# Patient Record
Sex: MALE | Race: BLACK OR AFRICAN AMERICAN | NOT HISPANIC OR LATINO | URBAN - METROPOLITAN AREA
[De-identification: names, ages, dates, MRNs, and addresses within clinical notes are randomized per-mention and may not be internally consistent; named-entity substitution may affect disease eponyms.]

---

## 2017-01-22 ENCOUNTER — INPATIENT (INPATIENT)
Facility: HOSPITAL | Age: 57
LOS: 12 days | Discharge: HOME HEALTH | End: 2017-02-04
Attending: SURGERY | Admitting: SURGERY
Payer: COMMERCIAL

## 2017-01-22 ENCOUNTER — RESULT REVIEW (OUTPATIENT)
Age: 57
End: 2017-01-22

## 2017-01-22 VITALS
SYSTOLIC BLOOD PRESSURE: 150 MMHG | OXYGEN SATURATION: 97 % | HEIGHT: 68 IN | DIASTOLIC BLOOD PRESSURE: 106 MMHG | TEMPERATURE: 100 F | WEIGHT: 134.92 LBS | HEART RATE: 128 BPM | RESPIRATION RATE: 22 BRPM

## 2017-01-22 DIAGNOSIS — I10 ESSENTIAL (PRIMARY) HYPERTENSION: ICD-10-CM

## 2017-01-22 DIAGNOSIS — F10.230 ALCOHOL DEPENDENCE WITH WITHDRAWAL, UNCOMPLICATED: ICD-10-CM

## 2017-01-22 DIAGNOSIS — J45.909 UNSPECIFIED ASTHMA, UNCOMPLICATED: ICD-10-CM

## 2017-01-22 DIAGNOSIS — L02.211 CUTANEOUS ABSCESS OF ABDOMINAL WALL: ICD-10-CM

## 2017-01-22 DIAGNOSIS — Z98.890 OTHER SPECIFIED POSTPROCEDURAL STATES: Chronic | ICD-10-CM

## 2017-01-22 PROBLEM — Z00.00 ENCOUNTER FOR PREVENTIVE HEALTH EXAMINATION: Status: ACTIVE | Noted: 2017-01-22

## 2017-01-22 LAB
ALBUMIN SERPL ELPH-MCNC: 3.5 G/DL — SIGNIFICANT CHANGE UP (ref 3.3–5)
ALBUMIN SERPL ELPH-MCNC: 3.9 G/DL — SIGNIFICANT CHANGE UP (ref 3.3–5)
ALP SERPL-CCNC: 104 U/L — SIGNIFICANT CHANGE UP (ref 40–120)
ALP SERPL-CCNC: 99 U/L — SIGNIFICANT CHANGE UP (ref 40–120)
ALT FLD-CCNC: 46 U/L — SIGNIFICANT CHANGE UP (ref 12–78)
ALT FLD-CCNC: 57 U/L — SIGNIFICANT CHANGE UP (ref 12–78)
AMPHET UR-MCNC: NEGATIVE — SIGNIFICANT CHANGE UP
ANION GAP SERPL CALC-SCNC: 14 MMOL/L — SIGNIFICANT CHANGE UP (ref 5–17)
APTT BLD: 50.7 SEC — HIGH (ref 27.5–37.4)
AST SERPL-CCNC: 40 U/L — HIGH (ref 15–37)
AST SERPL-CCNC: 51 U/L — HIGH (ref 15–37)
BARBITURATES UR SCN-MCNC: NEGATIVE — SIGNIFICANT CHANGE UP
BASOPHILS # BLD AUTO: 0 K/UL — SIGNIFICANT CHANGE UP (ref 0–0.2)
BASOPHILS NFR BLD AUTO: 0.3 % — SIGNIFICANT CHANGE UP (ref 0–2)
BENZODIAZ UR-MCNC: NEGATIVE — SIGNIFICANT CHANGE UP
BILIRUB DIRECT SERPL-MCNC: 0.21 MG/DL — HIGH (ref 0.05–0.2)
BILIRUB INDIRECT FLD-MCNC: 0.6 MG/DL — SIGNIFICANT CHANGE UP (ref 0.2–1)
BILIRUB SERPL-MCNC: 0.7 MG/DL — SIGNIFICANT CHANGE UP (ref 0.2–1.2)
BILIRUB SERPL-MCNC: 0.8 MG/DL — SIGNIFICANT CHANGE UP (ref 0.2–1.2)
BLD GP AB SCN SERPL QL: SIGNIFICANT CHANGE UP
BUN SERPL-MCNC: 14 MG/DL — SIGNIFICANT CHANGE UP (ref 7–23)
CALCIUM SERPL-MCNC: 9 MG/DL — SIGNIFICANT CHANGE UP (ref 8.5–10.1)
CHLORIDE SERPL-SCNC: 98 MMOL/L — SIGNIFICANT CHANGE UP (ref 96–108)
CO2 SERPL-SCNC: 25 MMOL/L — SIGNIFICANT CHANGE UP (ref 22–31)
COCAINE METAB.OTHER UR-MCNC: NEGATIVE — SIGNIFICANT CHANGE UP
CREAT SERPL-MCNC: 1 MG/DL — SIGNIFICANT CHANGE UP (ref 0.5–1.3)
EOSINOPHIL # BLD AUTO: 0 K/UL — SIGNIFICANT CHANGE UP (ref 0–0.5)
EOSINOPHIL NFR BLD AUTO: 0.2 % — SIGNIFICANT CHANGE UP (ref 0–6)
ETHANOL SERPL-MCNC: <10 MG/DL — SIGNIFICANT CHANGE UP (ref 0–10)
GLUCOSE SERPL-MCNC: 81 MG/DL — SIGNIFICANT CHANGE UP (ref 70–99)
HCT VFR BLD CALC: 41.9 % — SIGNIFICANT CHANGE UP (ref 39–50)
HGB BLD-MCNC: 15.2 G/DL — SIGNIFICANT CHANGE UP (ref 13–17)
INR BLD: 1.22 RATIO — HIGH (ref 0.88–1.16)
LACTATE SERPL-SCNC: 1.7 MMOL/L — SIGNIFICANT CHANGE UP (ref 0.7–2)
LYMPHOCYTES # BLD AUTO: 0.4 K/UL — LOW (ref 1–3.3)
LYMPHOCYTES # BLD AUTO: 3.1 % — LOW (ref 13–44)
MAGNESIUM SERPL-MCNC: 2 MG/DL — SIGNIFICANT CHANGE UP (ref 1.8–2.4)
MCHC RBC-ENTMCNC: 34.2 PG — HIGH (ref 27–34)
MCHC RBC-ENTMCNC: 36.2 GM/DL — HIGH (ref 32–36)
MCV RBC AUTO: 94.5 FL — SIGNIFICANT CHANGE UP (ref 80–100)
METHADONE UR-MCNC: NEGATIVE — SIGNIFICANT CHANGE UP
MONOCYTES # BLD AUTO: 0.6 K/UL — SIGNIFICANT CHANGE UP (ref 0–0.9)
MONOCYTES NFR BLD AUTO: 4.4 % — SIGNIFICANT CHANGE UP (ref 2–14)
NEUTROPHILS # BLD AUTO: 12.5 K/UL — HIGH (ref 1.8–7.4)
NEUTROPHILS NFR BLD AUTO: 92.1 % — HIGH (ref 43–77)
OPIATES UR-MCNC: POSITIVE — SIGNIFICANT CHANGE UP
PCP SPEC-MCNC: SIGNIFICANT CHANGE UP
PCP UR-MCNC: NEGATIVE — SIGNIFICANT CHANGE UP
PHOSPHATE SERPL-MCNC: 2.7 MG/DL — SIGNIFICANT CHANGE UP (ref 2.5–4.5)
PLATELET # BLD AUTO: 231 K/UL — SIGNIFICANT CHANGE UP (ref 150–400)
POTASSIUM SERPL-MCNC: 4 MMOL/L — SIGNIFICANT CHANGE UP (ref 3.5–5.3)
POTASSIUM SERPL-SCNC: 4 MMOL/L — SIGNIFICANT CHANGE UP (ref 3.5–5.3)
PROT SERPL-MCNC: 8.3 GM/DL — SIGNIFICANT CHANGE UP (ref 6–8.3)
PROT SERPL-MCNC: 9.1 GM/DL — HIGH (ref 6–8.3)
PROTHROM AB SERPL-ACNC: 13.7 SEC — HIGH (ref 10–13.1)
RBC # BLD: 4.43 M/UL — SIGNIFICANT CHANGE UP (ref 4.2–5.8)
RBC # FLD: 11 % — SIGNIFICANT CHANGE UP (ref 11–15)
SODIUM SERPL-SCNC: 137 MMOL/L — SIGNIFICANT CHANGE UP (ref 135–145)
THC UR QL: NEGATIVE — SIGNIFICANT CHANGE UP
WBC # BLD: 13.5 K/UL — HIGH (ref 3.8–10.5)
WBC # FLD AUTO: 13.5 K/UL — HIGH (ref 3.8–10.5)

## 2017-01-22 PROCEDURE — 74177 CT ABD & PELVIS W/CONTRAST: CPT | Mod: 26

## 2017-01-22 PROCEDURE — 71010: CPT | Mod: 26

## 2017-01-22 PROCEDURE — 99285 EMERGENCY DEPT VISIT HI MDM: CPT

## 2017-01-22 RX ORDER — SODIUM CHLORIDE 9 MG/ML
1000 INJECTION, SOLUTION INTRAVENOUS
Qty: 0 | Refills: 0 | Status: COMPLETED | OUTPATIENT
Start: 2017-01-22 | End: 2017-01-23

## 2017-01-22 RX ORDER — ACETAMINOPHEN 500 MG
1000 TABLET ORAL ONCE
Qty: 0 | Refills: 0 | Status: COMPLETED | OUTPATIENT
Start: 2017-01-22 | End: 2017-01-22

## 2017-01-22 RX ORDER — METOPROLOL TARTRATE 50 MG
5 TABLET ORAL EVERY 6 HOURS
Qty: 0 | Refills: 0 | Status: DISCONTINUED | OUTPATIENT
Start: 2017-01-22 | End: 2017-01-23

## 2017-01-22 RX ORDER — SODIUM CHLORIDE 9 MG/ML
1000 INJECTION INTRAMUSCULAR; INTRAVENOUS; SUBCUTANEOUS
Qty: 0 | Refills: 0 | Status: DISCONTINUED | OUTPATIENT
Start: 2017-01-22 | End: 2017-01-22

## 2017-01-22 RX ORDER — PIPERACILLIN AND TAZOBACTAM 4; .5 G/20ML; G/20ML
3.38 INJECTION, POWDER, LYOPHILIZED, FOR SOLUTION INTRAVENOUS EVERY 8 HOURS
Qty: 0 | Refills: 0 | Status: DISCONTINUED | OUTPATIENT
Start: 2017-01-22 | End: 2017-01-23

## 2017-01-22 RX ORDER — MORPHINE SULFATE 50 MG/1
4 CAPSULE, EXTENDED RELEASE ORAL ONCE
Qty: 0 | Refills: 0 | Status: DISCONTINUED | OUTPATIENT
Start: 2017-01-22 | End: 2017-01-22

## 2017-01-22 RX ORDER — HEPARIN SODIUM 5000 [USP'U]/ML
5000 INJECTION INTRAVENOUS; SUBCUTANEOUS EVERY 12 HOURS
Qty: 0 | Refills: 0 | Status: DISCONTINUED | OUTPATIENT
Start: 2017-01-22 | End: 2017-01-23

## 2017-01-22 RX ORDER — LISINOPRIL 2.5 MG/1
20 TABLET ORAL ONCE
Qty: 0 | Refills: 0 | Status: COMPLETED | OUTPATIENT
Start: 2017-01-22 | End: 2017-01-22

## 2017-01-22 RX ORDER — IPRATROPIUM/ALBUTEROL SULFATE 18-103MCG
3 AEROSOL WITH ADAPTER (GRAM) INHALATION EVERY 6 HOURS
Qty: 0 | Refills: 0 | Status: DISCONTINUED | OUTPATIENT
Start: 2017-01-22 | End: 2017-01-23

## 2017-01-22 RX ORDER — PIPERACILLIN AND TAZOBACTAM 4; .5 G/20ML; G/20ML
3.38 INJECTION, POWDER, LYOPHILIZED, FOR SOLUTION INTRAVENOUS ONCE
Qty: 0 | Refills: 0 | Status: COMPLETED | OUTPATIENT
Start: 2017-01-22 | End: 2017-01-22

## 2017-01-22 RX ORDER — VANCOMYCIN HCL 1 G
1000 VIAL (EA) INTRAVENOUS ONCE
Qty: 0 | Refills: 0 | Status: COMPLETED | OUTPATIENT
Start: 2017-01-22 | End: 2017-01-22

## 2017-01-22 RX ADMIN — Medication 5 MILLIGRAM(S): at 18:23

## 2017-01-22 RX ADMIN — MORPHINE SULFATE 4 MILLIGRAM(S): 50 CAPSULE, EXTENDED RELEASE ORAL at 10:16

## 2017-01-22 RX ADMIN — Medication 400 MILLIGRAM(S): at 22:50

## 2017-01-22 RX ADMIN — LISINOPRIL 20 MILLIGRAM(S): 2.5 TABLET ORAL at 15:33

## 2017-01-22 RX ADMIN — PIPERACILLIN AND TAZOBACTAM 200 GRAM(S): 4; .5 INJECTION, POWDER, LYOPHILIZED, FOR SOLUTION INTRAVENOUS at 12:22

## 2017-01-22 RX ADMIN — MORPHINE SULFATE 4 MILLIGRAM(S): 50 CAPSULE, EXTENDED RELEASE ORAL at 12:14

## 2017-01-22 RX ADMIN — SODIUM CHLORIDE 100 MILLILITER(S): 9 INJECTION, SOLUTION INTRAVENOUS at 18:44

## 2017-01-22 RX ADMIN — SODIUM CHLORIDE 100 MILLILITER(S): 9 INJECTION INTRAMUSCULAR; INTRAVENOUS; SUBCUTANEOUS at 13:16

## 2017-01-22 RX ADMIN — Medication 2 MILLIGRAM(S): at 18:23

## 2017-01-22 RX ADMIN — HEPARIN SODIUM 5000 UNIT(S): 5000 INJECTION INTRAVENOUS; SUBCUTANEOUS at 18:23

## 2017-01-22 RX ADMIN — Medication 2 MILLIGRAM(S): at 21:32

## 2017-01-22 RX ADMIN — Medication 2 MILLIGRAM(S): at 13:13

## 2017-01-22 RX ADMIN — Medication 250 MILLIGRAM(S): at 13:42

## 2017-01-22 NOTE — ED ADULT NURSE REASSESSMENT NOTE - NS ED NURSE REASSESS COMMENT FT1
patient in no acute distress sleeping easy to arouse A&Ox3 when awake seen by Christina Carrillo at this time , Np aware patient /111 and Hr 117 no other orders at this time

## 2017-01-22 NOTE — H&P ADULT. - PROBLEM SELECTOR PLAN 2
Medical consult called, [facility] protocol ordered Medical consult called, [facility] protocol ordered, telemetry
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
## 2017-01-22 NOTE — ED ADULT NURSE REASSESSMENT NOTE - NS ED NURSE REASSESS COMMENT FT1
patient A&Ox3 in no acute distress patient has a tremors he stated he is drinking last drink 2 days ago Dr Sullivan seen patient at this time

## 2017-01-22 NOTE — ED ADULT NURSE NOTE - OBJECTIVE STATEMENT
pt c/o lower mid abdominal pain x 3-4 days. lower abdominal scar with swelling noted. pt states last bm on friday, usually has bm every morning. denies n/v/d
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
## 2017-01-22 NOTE — ED PROVIDER NOTE - GASTROINTESTINAL, MLM
Abdomen soft, except for lower abdominal region where there is a 12cmx 8cm fluctuant indurated mass noted, congruent with abscess  no guarding.
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
## 2017-01-22 NOTE — ED PROVIDER NOTE - MEDICAL DECISION MAKING DETAILS
pt with abd wall abscess 6cm x 4x5cm approximate size on CT abd/pelvis to admit to Dr. Jones pending drainage

## 2017-01-22 NOTE — ED ADULT NURSE REASSESSMENT NOTE - NS ED NURSE REASSESS COMMENT FT1
patient in no acute distress sleeping at this time easy to arouse call Md to notify for Bp and Hr , Dr Sullivan aware patient BP and HR awaiting orders place patient on cardiac monitor

## 2017-01-22 NOTE — H&P ADULT. - RS GEN PE MLT RESP DETAILS PC
no wheezes/clear to auscultation bilaterally/no chest wall tenderness/no rales/respirations non-labored/no rhonchi

## 2017-01-22 NOTE — H&P ADULT. - PROBLEM SELECTOR PLAN 1
admit, IV abx, IV hydration, f/u labs, DVT ppx, OR planning for 1/23 admit, IV abx, IV hydration, f/u labs, DVT ppx, pain management PRN, OR planning for 1/23 admit, NPO, IV abx, IV hydration, f/u labs, DVT ppx, pain management PRN, OR planning for 1/23 pending surgical consult

## 2017-01-22 NOTE — H&P ADULT. - HISTORY OF PRESENT ILLNESS
Patient is a 56 year old male with PMH HTN, asthma, etoh abuse who presents to ED c/o abdominal pain x 1 week. Pt reports associated localized warmth and discoloration of surrounding skin that began suddenly. Denies recent trauma to the area. Pt states that the pain at home was 10/10 and unlike anything he has ever felt before. Denies fevers, chills, change in bowel habits, chest pain, sob.     CT in ED shows: Complex collection in the ventral abdominal wall, measuring 6.0 cm. Contents measure higher than simple fluid.

## 2017-01-22 NOTE — ED ADULT NURSE REASSESSMENT NOTE - REASSESS COMMUNICATION
pt admits to ETOH abuse pt scores 11 on ciwa informed JASMIN coronado putting call out to medicine to inform pt in in need of withdrawal medication, also informed of htn and hr will continue to monitor closely

## 2017-01-22 NOTE — ED ADULT NURSE REASSESSMENT NOTE - NS ED NURSE REASSESS COMMENT FT1
patient in no acute distress refuses Ativan at this time CIWA 1 at this time , patient sleeping easy to arouse surgical Pa seen patient at this time

## 2017-01-23 DIAGNOSIS — J45.21 MILD INTERMITTENT ASTHMA WITH (ACUTE) EXACERBATION: ICD-10-CM

## 2017-01-23 DIAGNOSIS — F10.230 ALCOHOL DEPENDENCE WITH WITHDRAWAL, UNCOMPLICATED: ICD-10-CM

## 2017-01-23 LAB
ALBUMIN SERPL ELPH-MCNC: 2.5 G/DL — LOW (ref 3.3–5)
ALP SERPL-CCNC: 67 U/L — SIGNIFICANT CHANGE UP (ref 40–120)
ALT FLD-CCNC: 30 U/L — SIGNIFICANT CHANGE UP (ref 12–78)
ANION GAP SERPL CALC-SCNC: 11 MMOL/L — SIGNIFICANT CHANGE UP (ref 5–17)
ANION GAP SERPL CALC-SCNC: 12 MMOL/L — SIGNIFICANT CHANGE UP (ref 5–17)
ANION GAP SERPL CALC-SCNC: 8 MMOL/L — SIGNIFICANT CHANGE UP (ref 5–17)
AST SERPL-CCNC: 33 U/L — SIGNIFICANT CHANGE UP (ref 15–37)
BASE EXCESS BLDA CALC-SCNC: 0.9 MMOL/L — SIGNIFICANT CHANGE UP (ref -2–2)
BASOPHILS # BLD AUTO: 0.1 K/UL — SIGNIFICANT CHANGE UP (ref 0–0.2)
BASOPHILS NFR BLD AUTO: 1 % — SIGNIFICANT CHANGE UP (ref 0–2)
BILIRUB SERPL-MCNC: 1.9 MG/DL — HIGH (ref 0.2–1.2)
BLOOD GAS COMMENTS: SIGNIFICANT CHANGE UP
BLOOD GAS SOURCE: SIGNIFICANT CHANGE UP
BUN SERPL-MCNC: 12 MG/DL — SIGNIFICANT CHANGE UP (ref 7–23)
BUN SERPL-MCNC: 14 MG/DL — SIGNIFICANT CHANGE UP (ref 7–23)
BUN SERPL-MCNC: 14 MG/DL — SIGNIFICANT CHANGE UP (ref 7–23)
CALCIUM SERPL-MCNC: 7.8 MG/DL — LOW (ref 8.5–10.1)
CALCIUM SERPL-MCNC: 8.3 MG/DL — LOW (ref 8.5–10.1)
CALCIUM SERPL-MCNC: 9 MG/DL — SIGNIFICANT CHANGE UP (ref 8.5–10.1)
CHLORIDE SERPL-SCNC: 103 MMOL/L — SIGNIFICANT CHANGE UP (ref 96–108)
CHLORIDE SERPL-SCNC: 99 MMOL/L — SIGNIFICANT CHANGE UP (ref 96–108)
CHLORIDE SERPL-SCNC: 99 MMOL/L — SIGNIFICANT CHANGE UP (ref 96–108)
CO2 SERPL-SCNC: 26 MMOL/L — SIGNIFICANT CHANGE UP (ref 22–31)
CO2 SERPL-SCNC: 27 MMOL/L — SIGNIFICANT CHANGE UP (ref 22–31)
CO2 SERPL-SCNC: 31 MMOL/L — SIGNIFICANT CHANGE UP (ref 22–31)
CREAT SERPL-MCNC: 0.71 MG/DL — SIGNIFICANT CHANGE UP (ref 0.5–1.3)
CREAT SERPL-MCNC: 1 MG/DL — SIGNIFICANT CHANGE UP (ref 0.5–1.3)
CREAT SERPL-MCNC: 1.1 MG/DL — SIGNIFICANT CHANGE UP (ref 0.5–1.3)
EOSINOPHIL # BLD AUTO: 0.2 K/UL — SIGNIFICANT CHANGE UP (ref 0–0.5)
EOSINOPHIL NFR BLD AUTO: 2 % — SIGNIFICANT CHANGE UP (ref 0–6)
GLUCOSE SERPL-MCNC: 120 MG/DL — HIGH (ref 70–99)
GLUCOSE SERPL-MCNC: 91 MG/DL — SIGNIFICANT CHANGE UP (ref 70–99)
GLUCOSE SERPL-MCNC: 96 MG/DL — SIGNIFICANT CHANGE UP (ref 70–99)
HCO3 BLDA-SCNC: 25 MMOL/L — SIGNIFICANT CHANGE UP (ref 21–29)
HCT VFR BLD CALC: 35.2 % — LOW (ref 39–50)
HCT VFR BLD CALC: 39.4 % — SIGNIFICANT CHANGE UP (ref 39–50)
HGB BLD-MCNC: 12.8 G/DL — LOW (ref 13–17)
HGB BLD-MCNC: 13.6 G/DL — SIGNIFICANT CHANGE UP (ref 13–17)
HOROWITZ INDEX BLDA+IHG-RTO: 50 — SIGNIFICANT CHANGE UP
LYMPHOCYTES # BLD AUTO: 0.8 K/UL — LOW (ref 1–3.3)
LYMPHOCYTES # BLD AUTO: 6.9 % — LOW (ref 13–44)
MAGNESIUM SERPL-MCNC: 1.6 MG/DL — LOW (ref 1.8–2.4)
MCHC RBC-ENTMCNC: 34.2 PG — HIGH (ref 27–34)
MCHC RBC-ENTMCNC: 34.5 GM/DL — SIGNIFICANT CHANGE UP (ref 32–36)
MCHC RBC-ENTMCNC: 35 PG — HIGH (ref 27–34)
MCHC RBC-ENTMCNC: 36.4 GM/DL — HIGH (ref 32–36)
MCV RBC AUTO: 96.1 FL — SIGNIFICANT CHANGE UP (ref 80–100)
MCV RBC AUTO: 99.2 FL — SIGNIFICANT CHANGE UP (ref 80–100)
MONOCYTES # BLD AUTO: 0.8 K/UL — SIGNIFICANT CHANGE UP (ref 0–0.9)
MONOCYTES NFR BLD AUTO: 7.3 % — SIGNIFICANT CHANGE UP (ref 2–14)
NEUTROPHILS # BLD AUTO: 9.3 K/UL — HIGH (ref 1.8–7.4)
NEUTROPHILS NFR BLD AUTO: 82.9 % — HIGH (ref 43–77)
PCO2 BLDA: 42 MMHG — SIGNIFICANT CHANGE UP (ref 32–46)
PH BLD: 7.4 — SIGNIFICANT CHANGE UP (ref 7.35–7.45)
PHOSPHATE SERPL-MCNC: 3.2 MG/DL — SIGNIFICANT CHANGE UP (ref 2.5–4.5)
PLATELET # BLD AUTO: 206 K/UL — SIGNIFICANT CHANGE UP (ref 150–400)
PLATELET # BLD AUTO: 207 K/UL — SIGNIFICANT CHANGE UP (ref 150–400)
PO2 BLDA: 257 MMHG — HIGH (ref 74–108)
POTASSIUM SERPL-MCNC: 3.6 MMOL/L — SIGNIFICANT CHANGE UP (ref 3.5–5.3)
POTASSIUM SERPL-MCNC: 3.8 MMOL/L — SIGNIFICANT CHANGE UP (ref 3.5–5.3)
POTASSIUM SERPL-MCNC: 4 MMOL/L — SIGNIFICANT CHANGE UP (ref 3.5–5.3)
POTASSIUM SERPL-SCNC: 3.6 MMOL/L — SIGNIFICANT CHANGE UP (ref 3.5–5.3)
POTASSIUM SERPL-SCNC: 3.8 MMOL/L — SIGNIFICANT CHANGE UP (ref 3.5–5.3)
POTASSIUM SERPL-SCNC: 4 MMOL/L — SIGNIFICANT CHANGE UP (ref 3.5–5.3)
PROT SERPL-MCNC: 6.3 GM/DL — SIGNIFICANT CHANGE UP (ref 6–8.3)
RBC # BLD: 3.67 M/UL — LOW (ref 4.2–5.8)
RBC # BLD: 3.97 M/UL — LOW (ref 4.2–5.8)
RBC # FLD: 11.4 % — SIGNIFICANT CHANGE UP (ref 11–15)
RBC # FLD: 11.8 % — SIGNIFICANT CHANGE UP (ref 11–15)
SAO2 % BLDA: 99 % — HIGH (ref 92–96)
SODIUM SERPL-SCNC: 138 MMOL/L — SIGNIFICANT CHANGE UP (ref 135–145)
SODIUM SERPL-SCNC: 138 MMOL/L — SIGNIFICANT CHANGE UP (ref 135–145)
SODIUM SERPL-SCNC: 140 MMOL/L — SIGNIFICANT CHANGE UP (ref 135–145)
WBC # BLD: 11.2 K/UL — HIGH (ref 3.8–10.5)
WBC # BLD: 9 K/UL — SIGNIFICANT CHANGE UP (ref 3.8–10.5)
WBC # FLD AUTO: 11.2 K/UL — HIGH (ref 3.8–10.5)
WBC # FLD AUTO: 9 K/UL — SIGNIFICANT CHANGE UP (ref 3.8–10.5)

## 2017-01-23 PROCEDURE — 99233 SBSQ HOSP IP/OBS HIGH 50: CPT

## 2017-01-23 PROCEDURE — 22900 EXC ABDL TUM DEEP < 5 CM: CPT | Mod: AS

## 2017-01-23 PROCEDURE — 71010: CPT | Mod: 26

## 2017-01-23 PROCEDURE — 88300 SURGICAL PATH GROSS: CPT | Mod: 26

## 2017-01-23 RX ORDER — ACETAMINOPHEN 500 MG
800 TABLET ORAL ONCE
Qty: 0 | Refills: 0 | Status: COMPLETED | OUTPATIENT
Start: 2017-01-23 | End: 2017-01-23

## 2017-01-23 RX ORDER — PROPOFOL 10 MG/ML
25 INJECTION, EMULSION INTRAVENOUS
Qty: 1000 | Refills: 0 | Status: DISCONTINUED | OUTPATIENT
Start: 2017-01-23 | End: 2017-01-24

## 2017-01-23 RX ORDER — PIPERACILLIN AND TAZOBACTAM 4; .5 G/20ML; G/20ML
3.38 INJECTION, POWDER, LYOPHILIZED, FOR SOLUTION INTRAVENOUS ONCE
Qty: 0 | Refills: 0 | Status: COMPLETED | OUTPATIENT
Start: 2017-01-23 | End: 2017-01-23

## 2017-01-23 RX ORDER — FENTANYL CITRATE 50 UG/ML
50 INJECTION INTRAVENOUS
Qty: 0 | Refills: 0 | Status: DISCONTINUED | OUTPATIENT
Start: 2017-01-23 | End: 2017-01-23

## 2017-01-23 RX ORDER — INFLUENZA VIRUS VACCINE 15; 15; 15; 15 UG/.5ML; UG/.5ML; UG/.5ML; UG/.5ML
0.5 SUSPENSION INTRAMUSCULAR ONCE
Qty: 0 | Refills: 0 | Status: DISCONTINUED | OUTPATIENT
Start: 2017-01-23 | End: 2017-02-04

## 2017-01-23 RX ORDER — VANCOMYCIN HCL 1 G
1000 VIAL (EA) INTRAVENOUS EVERY 12 HOURS
Qty: 0 | Refills: 0 | Status: DISCONTINUED | OUTPATIENT
Start: 2017-01-23 | End: 2017-01-26

## 2017-01-23 RX ORDER — SODIUM CHLORIDE 9 MG/ML
1000 INJECTION INTRAMUSCULAR; INTRAVENOUS; SUBCUTANEOUS
Qty: 0 | Refills: 0 | Status: DISCONTINUED | OUTPATIENT
Start: 2017-01-23 | End: 2017-01-23

## 2017-01-23 RX ORDER — SODIUM CHLORIDE 9 MG/ML
1000 INJECTION, SOLUTION INTRAVENOUS
Qty: 0 | Refills: 0 | Status: DISCONTINUED | OUTPATIENT
Start: 2017-01-23 | End: 2017-01-23

## 2017-01-23 RX ORDER — PIPERACILLIN AND TAZOBACTAM 4; .5 G/20ML; G/20ML
3.38 INJECTION, POWDER, LYOPHILIZED, FOR SOLUTION INTRAVENOUS EVERY 8 HOURS
Qty: 0 | Refills: 0 | Status: DISCONTINUED | OUTPATIENT
Start: 2017-01-23 | End: 2017-01-26

## 2017-01-23 RX ORDER — MORPHINE SULFATE 50 MG/1
2 CAPSULE, EXTENDED RELEASE ORAL EVERY 6 HOURS
Qty: 0 | Refills: 0 | Status: DISCONTINUED | OUTPATIENT
Start: 2017-01-23 | End: 2017-01-23

## 2017-01-23 RX ORDER — FENTANYL CITRATE 50 UG/ML
50 INJECTION INTRAVENOUS ONCE
Qty: 0 | Refills: 0 | Status: DISCONTINUED | OUTPATIENT
Start: 2017-01-23 | End: 2017-01-23

## 2017-01-23 RX ORDER — PANTOPRAZOLE SODIUM 20 MG/1
40 TABLET, DELAYED RELEASE ORAL DAILY
Qty: 0 | Refills: 0 | Status: DISCONTINUED | OUTPATIENT
Start: 2017-01-23 | End: 2017-01-24

## 2017-01-23 RX ORDER — PROPOFOL 10 MG/ML
25 INJECTION, EMULSION INTRAVENOUS
Qty: 500 | Refills: 0 | Status: DISCONTINUED | OUTPATIENT
Start: 2017-01-23 | End: 2017-01-23

## 2017-01-23 RX ORDER — FENTANYL CITRATE 50 UG/ML
25 INJECTION INTRAVENOUS
Qty: 0 | Refills: 0 | Status: DISCONTINUED | OUTPATIENT
Start: 2017-01-23 | End: 2017-01-23

## 2017-01-23 RX ADMIN — Medication 1.5 MILLIGRAM(S): at 11:11

## 2017-01-23 RX ADMIN — PIPERACILLIN AND TAZOBACTAM 25 GRAM(S): 4; .5 INJECTION, POWDER, LYOPHILIZED, FOR SOLUTION INTRAVENOUS at 16:55

## 2017-01-23 RX ADMIN — PIPERACILLIN AND TAZOBACTAM 25 GRAM(S): 4; .5 INJECTION, POWDER, LYOPHILIZED, FOR SOLUTION INTRAVENOUS at 06:26

## 2017-01-23 RX ADMIN — FENTANYL CITRATE 50 MICROGRAM(S): 50 INJECTION INTRAVENOUS at 21:35

## 2017-01-23 RX ADMIN — FENTANYL CITRATE 50 MICROGRAM(S): 50 INJECTION INTRAVENOUS at 21:22

## 2017-01-23 RX ADMIN — Medication 5 MILLIGRAM(S): at 14:31

## 2017-01-23 RX ADMIN — Medication 320 MILLIGRAM(S): at 13:15

## 2017-01-23 RX ADMIN — Medication 250 MILLIGRAM(S): at 23:10

## 2017-01-23 RX ADMIN — Medication 2 MILLIGRAM(S): at 02:00

## 2017-01-23 RX ADMIN — PANTOPRAZOLE SODIUM 40 MILLIGRAM(S): 20 TABLET, DELAYED RELEASE ORAL at 23:17

## 2017-01-23 RX ADMIN — SODIUM CHLORIDE 100 MILLILITER(S): 9 INJECTION, SOLUTION INTRAVENOUS at 13:14

## 2017-01-23 RX ADMIN — SODIUM CHLORIDE 100 MILLILITER(S): 9 INJECTION, SOLUTION INTRAVENOUS at 19:27

## 2017-01-23 RX ADMIN — SODIUM CHLORIDE 100 MILLILITER(S): 9 INJECTION, SOLUTION INTRAVENOUS at 22:02

## 2017-01-23 RX ADMIN — PIPERACILLIN AND TAZOBACTAM 200 GRAM(S): 4; .5 INJECTION, POWDER, LYOPHILIZED, FOR SOLUTION INTRAVENOUS at 16:55

## 2017-01-23 RX ADMIN — Medication 2 MILLIGRAM(S): at 06:26

## 2017-01-23 RX ADMIN — PIPERACILLIN AND TAZOBACTAM 25 GRAM(S): 4; .5 INJECTION, POWDER, LYOPHILIZED, FOR SOLUTION INTRAVENOUS at 23:17

## 2017-01-23 RX ADMIN — PROPOFOL 8.91 MICROGRAM(S)/KG/MIN: 10 INJECTION, EMULSION INTRAVENOUS at 19:30

## 2017-01-23 RX ADMIN — Medication 5 MILLIGRAM(S): at 06:26

## 2017-01-23 RX ADMIN — HEPARIN SODIUM 5000 UNIT(S): 5000 INJECTION INTRAVENOUS; SUBCUTANEOUS at 06:27

## 2017-01-23 NOTE — CHART NOTE - NSCHARTNOTEFT_GEN_A_CORE
Called to assess wound; dressing is saturated with blood.  Patient is POD#0 s/p incision and drainage of infected abdominal wall granuloma.  Vital signs presently stable.  Patient is lying supine, in NAD. Denies pain, nausea, dizziness, lightheadedness.  Dressing removed. Kerlix packing in wound is saturated with bright red blood and clots - removed.  Base of wound noted with active bloody oozing. Surgicel applied, packed tightly with gauze, pressure held x 15 minutes.  Upon removal, blood continued to pool in wound. Wound packed again, pressure dressing applied.  Dr Jones notified, patient to return to OR for coagulation of bleeding.  CBC stable. T&S noted. Pt informed of plan, consent to be obtained by Dr Jones.
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
## 2017-01-23 NOTE — CONSULT NOTE ADULT - ASSESSMENT
55 Y/O male s/p abd wall I&D followed by abdominal wound exploration and control of hemorrhage remains intubated postoperatively.

## 2017-01-23 NOTE — CONSULT NOTE ADULT - ATTENDING COMMENTS
56 M s/p I+D of wall abscess w/ postop bleeding. Pt clinically stable and intubated. Not requiring pressors and transfused prbc for bleeding. Cont serial cbc. cont pressure dressing on abd. cont abx and f/u cx from surgery. PS trial and extubate if does well. Monitor for etoh withdrawal
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
## 2017-01-23 NOTE — CONSULT NOTE ADULT - PROBLEM SELECTOR RECOMMENDATION 9
1. Admit pt to ICU  2. Continue abx coverage  3. DVT prophylaxis   4. PRBC's as needed  5. repeat labs, replace electrolytes as needed   6. ABG, cxr, make vent changes accordingly

## 2017-01-23 NOTE — PROGRESS NOTE ADULT - SUBJECTIVE AND OBJECTIVE BOX
Awake, alert, c/o pain  VSS, afebrile    Abdomen - soft, large, indurated, fluctuant mass midline, draining seropurulent fluid    Ext w/o edema    Neuro - alert, oriented x 3  Abx  This complex abscess not amenable to perc drainage.  For OR today  Withdrawal controlled

## 2017-01-24 LAB
ALBUMIN SERPL ELPH-MCNC: 2.5 G/DL — LOW (ref 3.3–5)
ALP SERPL-CCNC: 66 U/L — SIGNIFICANT CHANGE UP (ref 40–120)
ALT FLD-CCNC: 26 U/L — SIGNIFICANT CHANGE UP (ref 12–78)
ANION GAP SERPL CALC-SCNC: 10 MMOL/L — SIGNIFICANT CHANGE UP (ref 5–17)
APTT BLD: 42.2 SEC — HIGH (ref 27.5–37.4)
APTT BLD: 42.4 SEC — HIGH (ref 27.5–37.4)
AST SERPL-CCNC: 30 U/L — SIGNIFICANT CHANGE UP (ref 15–37)
BILIRUB SERPL-MCNC: 1.2 MG/DL — SIGNIFICANT CHANGE UP (ref 0.2–1.2)
BUN SERPL-MCNC: 8 MG/DL — SIGNIFICANT CHANGE UP (ref 7–23)
CALCIUM SERPL-MCNC: 7.6 MG/DL — LOW (ref 8.5–10.1)
CHLORIDE SERPL-SCNC: 101 MMOL/L — SIGNIFICANT CHANGE UP (ref 96–108)
CO2 SERPL-SCNC: 28 MMOL/L — SIGNIFICANT CHANGE UP (ref 22–31)
CREAT SERPL-MCNC: 0.7 MG/DL — SIGNIFICANT CHANGE UP (ref 0.5–1.3)
GLUCOSE SERPL-MCNC: 112 MG/DL — HIGH (ref 70–99)
HCT VFR BLD CALC: 31.7 % — LOW (ref 39–50)
HCT VFR BLD CALC: 33.4 % — LOW (ref 39–50)
HCT VFR BLD CALC: 33.8 % — LOW (ref 39–50)
HCT VFR BLD CALC: 34.4 % — LOW (ref 39–50)
HCT VFR BLD CALC: 35.5 % — LOW (ref 39–50)
HGB BLD-MCNC: 11.3 G/DL — LOW (ref 13–17)
HGB BLD-MCNC: 12.2 G/DL — LOW (ref 13–17)
HGB BLD-MCNC: 12.3 G/DL — LOW (ref 13–17)
HGB BLD-MCNC: 12.5 G/DL — LOW (ref 13–17)
HGB BLD-MCNC: 12.9 G/DL — LOW (ref 13–17)
INR BLD: 1.17 RATIO — HIGH (ref 0.88–1.16)
INR BLD: 1.21 RATIO — HIGH (ref 0.88–1.16)
MAGNESIUM SERPL-MCNC: 2 MG/DL — SIGNIFICANT CHANGE UP (ref 1.8–2.4)
MCHC RBC-ENTMCNC: 34.1 PG — HIGH (ref 27–34)
MCHC RBC-ENTMCNC: 34.2 PG — HIGH (ref 27–34)
MCHC RBC-ENTMCNC: 34.2 PG — HIGH (ref 27–34)
MCHC RBC-ENTMCNC: 34.3 PG — HIGH (ref 27–34)
MCHC RBC-ENTMCNC: 34.4 PG — HIGH (ref 27–34)
MCHC RBC-ENTMCNC: 35.7 GM/DL — SIGNIFICANT CHANGE UP (ref 32–36)
MCHC RBC-ENTMCNC: 36.4 GM/DL — HIGH (ref 32–36)
MCHC RBC-ENTMCNC: 36.4 GM/DL — HIGH (ref 32–36)
MCHC RBC-ENTMCNC: 36.5 GM/DL — HIGH (ref 32–36)
MCHC RBC-ENTMCNC: 36.5 GM/DL — HIGH (ref 32–36)
MCV RBC AUTO: 93.6 FL — SIGNIFICANT CHANGE UP (ref 80–100)
MCV RBC AUTO: 93.8 FL — SIGNIFICANT CHANGE UP (ref 80–100)
MCV RBC AUTO: 94.1 FL — SIGNIFICANT CHANGE UP (ref 80–100)
MCV RBC AUTO: 94.6 FL — SIGNIFICANT CHANGE UP (ref 80–100)
MCV RBC AUTO: 95.6 FL — SIGNIFICANT CHANGE UP (ref 80–100)
PHOSPHATE SERPL-MCNC: 2.8 MG/DL — SIGNIFICANT CHANGE UP (ref 2.5–4.5)
PLATELET # BLD AUTO: 151 K/UL — SIGNIFICANT CHANGE UP (ref 150–400)
PLATELET # BLD AUTO: 154 K/UL — SIGNIFICANT CHANGE UP (ref 150–400)
PLATELET # BLD AUTO: 156 K/UL — SIGNIFICANT CHANGE UP (ref 150–400)
PLATELET # BLD AUTO: 159 K/UL — SIGNIFICANT CHANGE UP (ref 150–400)
PLATELET # BLD AUTO: 164 K/UL — SIGNIFICANT CHANGE UP (ref 150–400)
POTASSIUM SERPL-MCNC: 3.4 MMOL/L — LOW (ref 3.5–5.3)
POTASSIUM SERPL-SCNC: 3.4 MMOL/L — LOW (ref 3.5–5.3)
PROT SERPL-MCNC: 5.9 GM/DL — LOW (ref 6–8.3)
PROTHROM AB SERPL-ACNC: 13.1 SEC — SIGNIFICANT CHANGE UP (ref 10–13.1)
PROTHROM AB SERPL-ACNC: 13.6 SEC — HIGH (ref 10–13.1)
RBC # BLD: 3.31 M/UL — LOW (ref 4.2–5.8)
RBC # BLD: 3.56 M/UL — LOW (ref 4.2–5.8)
RBC # BLD: 3.6 M/UL — LOW (ref 4.2–5.8)
RBC # BLD: 3.64 M/UL — LOW (ref 4.2–5.8)
RBC # BLD: 3.77 M/UL — LOW (ref 4.2–5.8)
RBC # FLD: 13 % — SIGNIFICANT CHANGE UP (ref 11–15)
RBC # FLD: 13.3 % — SIGNIFICANT CHANGE UP (ref 11–15)
RBC # FLD: 13.4 % — SIGNIFICANT CHANGE UP (ref 11–15)
RBC # FLD: 13.4 % — SIGNIFICANT CHANGE UP (ref 11–15)
RBC # FLD: 13.9 % — SIGNIFICANT CHANGE UP (ref 11–15)
SODIUM SERPL-SCNC: 139 MMOL/L — SIGNIFICANT CHANGE UP (ref 135–145)
SURGICAL PATHOLOGY FINAL REPORT - CH: SIGNIFICANT CHANGE UP
WBC # BLD: 8.1 K/UL — SIGNIFICANT CHANGE UP (ref 3.8–10.5)
WBC # BLD: 8.2 K/UL — SIGNIFICANT CHANGE UP (ref 3.8–10.5)
WBC # BLD: 8.3 K/UL — SIGNIFICANT CHANGE UP (ref 3.8–10.5)
WBC # BLD: 8.5 K/UL — SIGNIFICANT CHANGE UP (ref 3.8–10.5)
WBC # BLD: 8.8 K/UL — SIGNIFICANT CHANGE UP (ref 3.8–10.5)
WBC # FLD AUTO: 8.1 K/UL — SIGNIFICANT CHANGE UP (ref 3.8–10.5)
WBC # FLD AUTO: 8.2 K/UL — SIGNIFICANT CHANGE UP (ref 3.8–10.5)
WBC # FLD AUTO: 8.3 K/UL — SIGNIFICANT CHANGE UP (ref 3.8–10.5)
WBC # FLD AUTO: 8.5 K/UL — SIGNIFICANT CHANGE UP (ref 3.8–10.5)
WBC # FLD AUTO: 8.8 K/UL — SIGNIFICANT CHANGE UP (ref 3.8–10.5)

## 2017-01-24 PROCEDURE — 99291 CRITICAL CARE FIRST HOUR: CPT

## 2017-01-24 RX ORDER — FENTANYL CITRATE 50 UG/ML
1 INJECTION INTRAVENOUS
Qty: 2500 | Refills: 0 | Status: DISCONTINUED | OUTPATIENT
Start: 2017-01-24 | End: 2017-01-24

## 2017-01-24 RX ORDER — POTASSIUM CHLORIDE 20 MEQ
10 PACKET (EA) ORAL ONCE
Qty: 0 | Refills: 0 | Status: COMPLETED | OUTPATIENT
Start: 2017-01-24 | End: 2017-01-24

## 2017-01-24 RX ORDER — SODIUM CHLORIDE 9 MG/ML
1000 INJECTION, SOLUTION INTRAVENOUS
Qty: 0 | Refills: 0 | Status: DISCONTINUED | OUTPATIENT
Start: 2017-01-24 | End: 2017-01-25

## 2017-01-24 RX ORDER — MORPHINE SULFATE 50 MG/1
2 CAPSULE, EXTENDED RELEASE ORAL EVERY 6 HOURS
Qty: 0 | Refills: 0 | Status: DISCONTINUED | OUTPATIENT
Start: 2017-01-24 | End: 2017-01-26

## 2017-01-24 RX ORDER — METOPROLOL TARTRATE 50 MG
25 TABLET ORAL EVERY 12 HOURS
Qty: 0 | Refills: 0 | Status: DISCONTINUED | OUTPATIENT
Start: 2017-01-24 | End: 2017-01-25

## 2017-01-24 RX ORDER — METOPROLOL TARTRATE 50 MG
5 TABLET ORAL ONCE
Qty: 0 | Refills: 0 | Status: COMPLETED | OUTPATIENT
Start: 2017-01-24 | End: 2017-01-24

## 2017-01-24 RX ORDER — ACETAMINOPHEN 500 MG
650 TABLET ORAL EVERY 6 HOURS
Qty: 0 | Refills: 0 | Status: DISCONTINUED | OUTPATIENT
Start: 2017-01-24 | End: 2017-02-04

## 2017-01-24 RX ORDER — CHLORHEXIDINE GLUCONATE 213 G/1000ML
15 SOLUTION TOPICAL
Qty: 0 | Refills: 0 | Status: DISCONTINUED | OUTPATIENT
Start: 2017-01-24 | End: 2017-01-24

## 2017-01-24 RX ORDER — MORPHINE SULFATE 50 MG/1
2 CAPSULE, EXTENDED RELEASE ORAL ONCE
Qty: 0 | Refills: 0 | Status: DISCONTINUED | OUTPATIENT
Start: 2017-01-24 | End: 2017-01-24

## 2017-01-24 RX ORDER — CHLORHEXIDINE GLUCONATE 213 G/1000ML
1 SOLUTION TOPICAL DAILY
Qty: 0 | Refills: 0 | Status: DISCONTINUED | OUTPATIENT
Start: 2017-01-24 | End: 2017-02-04

## 2017-01-24 RX ORDER — MAGNESIUM SULFATE 500 MG/ML
1 VIAL (ML) INJECTION ONCE
Qty: 0 | Refills: 0 | Status: COMPLETED | OUTPATIENT
Start: 2017-01-24 | End: 2017-01-24

## 2017-01-24 RX ADMIN — FENTANYL CITRATE 6.98 MICROGRAM(S)/KG/HR: 50 INJECTION INTRAVENOUS at 08:06

## 2017-01-24 RX ADMIN — SODIUM CHLORIDE 100 MILLILITER(S): 9 INJECTION, SOLUTION INTRAVENOUS at 18:44

## 2017-01-24 RX ADMIN — PIPERACILLIN AND TAZOBACTAM 25 GRAM(S): 4; .5 INJECTION, POWDER, LYOPHILIZED, FOR SOLUTION INTRAVENOUS at 06:28

## 2017-01-24 RX ADMIN — MORPHINE SULFATE 2 MILLIGRAM(S): 50 CAPSULE, EXTENDED RELEASE ORAL at 20:05

## 2017-01-24 RX ADMIN — Medication 100 GRAM(S): at 02:24

## 2017-01-24 RX ADMIN — Medication 100 MILLIEQUIVALENT(S): at 08:07

## 2017-01-24 RX ADMIN — Medication 250 MILLIGRAM(S): at 13:36

## 2017-01-24 RX ADMIN — SODIUM CHLORIDE 100 MILLILITER(S): 9 INJECTION, SOLUTION INTRAVENOUS at 09:22

## 2017-01-24 RX ADMIN — MORPHINE SULFATE 2 MILLIGRAM(S): 50 CAPSULE, EXTENDED RELEASE ORAL at 19:50

## 2017-01-24 RX ADMIN — MORPHINE SULFATE 2 MILLIGRAM(S): 50 CAPSULE, EXTENDED RELEASE ORAL at 15:10

## 2017-01-24 RX ADMIN — CHLORHEXIDINE GLUCONATE 1 APPLICATION(S): 213 SOLUTION TOPICAL at 12:14

## 2017-01-24 RX ADMIN — PIPERACILLIN AND TAZOBACTAM 25 GRAM(S): 4; .5 INJECTION, POWDER, LYOPHILIZED, FOR SOLUTION INTRAVENOUS at 13:36

## 2017-01-24 RX ADMIN — Medication 25 MILLIGRAM(S): at 17:26

## 2017-01-24 RX ADMIN — MORPHINE SULFATE 2 MILLIGRAM(S): 50 CAPSULE, EXTENDED RELEASE ORAL at 14:50

## 2017-01-24 RX ADMIN — PANTOPRAZOLE SODIUM 40 MILLIGRAM(S): 20 TABLET, DELAYED RELEASE ORAL at 12:07

## 2017-01-24 RX ADMIN — PIPERACILLIN AND TAZOBACTAM 25 GRAM(S): 4; .5 INJECTION, POWDER, LYOPHILIZED, FOR SOLUTION INTRAVENOUS at 21:41

## 2017-01-24 RX ADMIN — Medication 5 MILLIGRAM(S): at 13:20

## 2017-01-24 RX ADMIN — Medication 650 MILLIGRAM(S): at 16:54

## 2017-01-24 RX ADMIN — PROPOFOL 8.91 MICROGRAM(S)/KG/MIN: 10 INJECTION, EMULSION INTRAVENOUS at 09:21

## 2017-01-24 NOTE — PROGRESS NOTE ADULT - SUBJECTIVE AND OBJECTIVE BOX
Postoperative Day #: 1  Patient seen and examined bedside in ICU.  Pt is sedated and orally intubated.  Abdominal dressing was noted to be saturated with blood again this morning.    T(F): 100, Max: 100.5 (01-23 @ 09:50)  HR: 101 (90 - 122)  BP: 112/87 (107/76 - 196/140)  RR: 18 (12 - 26)  SpO2: 100% (91% - 100%)    PHYSICAL EXAM:  General: sedated  HEENT: ETT in situ  Abdomen: soft, nondistended. Wound packing partially removed; surgicel applied. Active bleeding has appeared to have ceased. Tightly repacked and pressure dressing applied with 10lb sandbag.  Extremities: SCD b/l LE  : dupont catheter indwelling with clear yellow urine    LABS:                        11.3   8.2   )-----------( 164      ( 24 Jan 2017 05:33 )             31.7     24 Jan 2017 05:33    139    |  101    |  8      ----------------------------<  112    3.4     |  28     |  0.70     Ca    7.6        24 Jan 2017 05:33  Phos  2.8       24 Jan 2017 05:33  Mg     2.0       24 Jan 2017 05:33    TPro  5.9    /  Alb  2.5    /  TBili  1.2    /  DBili  x      /  AST  30     /  ALT  26     /  AlkPhos  66     24 Jan 2017 05:33    PT/INR - ( 24 Jan 2017 05:33 )   PT: 13.6 sec;   INR: 1.21 ratio    PTT - ( 24 Jan 2017 05:33 )  PTT:42.2 sec    I&O's 2890/1220cc    Impression: 56y Male PMH HTN, Asthma, ETOH abuse admitted with abdominal wall abscess, POD# 1 s/p incision and drainage of abdominal wall infected hematoma, evacuation of clots, removal of suture foreign body and subsequent return to OR for control of wound hemorrhage.    Plan:  -continue present CCU management, fentanyl, vent support  -H/h stable, patient is s/p 3u PRBC and 2u FFP. f/u serial CBC results q4h  -will reassess wound with Dr Jones later this am

## 2017-01-24 NOTE — DIETITIAN INITIAL EVALUATION ADULT. - ETIOLOGY
Pt not meeting estimated kkcal needs PTA, NPO status for 2 days NPO status 2/2 surgery x 2 days, Pt not consuming estimated kkcal needs PTA Decreased ability to consume sufficient energy

## 2017-01-24 NOTE — CHART NOTE - NSCHARTNOTEFT_GEN_A_CORE
Called by ICU RN to assess wound for bleeding.  Patient seen, awake in NAD. Superficial abdominal dressing removed. Wound packing is in place saturated with +serosanguineous drainage. No active bleeding noted in surrounding wound edges. New pressure dressing applied and sandbag placed.   H/h continues to be stable. Continue analgesia prn. Will continue to monitor for bleeding and continue to trend CBC. Will discuss with surgical attending.

## 2017-01-24 NOTE — DIETITIAN INITIAL EVALUATION ADULT. - OTHER INFO
Pt was seen for ICU. No N/V/D/C chewing/swallowing issues.  Pt reports UBW 61.4kg PTA.  Pt lives at home c spouse & two children; spouse & pt do the food shopping & cooking.  Pt reports to eating 2 meals daily; does not like breakfast.  Pt reports to usually eating a snack.  Consumed 4-5 beers & 5 cigarettes daily. Pt was seen for ICU. No N/V/D/C chewing/swallowing issues.  Pt reports UBW 61.4kg.  Pt lives at home c spouse & two children; spouse & pt do the food shopping & cooking.  Does not adhere to specific diet at home.  Pt reports to only eating 2 meals daily; does not eat breakfast.  Pt reports to usually eating 1-2 snacks daily.  Consumes 4-5 beers & 5 cigarettes daily at home. Pt was seen for ICU.  Pt appeared fatigued.  No N/V/D/C chewing/swallowing issues.  Pt lives at home c spouse & two children; spouse & pt do the food shopping & cooking.  Pt does not adhere to specific diet at home.  Pt reports to only eating 2 meals daily; does not eat breakfast.  Pt reports to usually eating 1-2 snacks daily.  Consumes 4-5 beers & 5 cigarettes at home daily. Pt was seen for ICU admission.  Pt appeared fatigued.  No N/V/D/C chewing/swallowing issues.  Pt lives at home c spouse & two children; spouse & pt do the food shopping & cooking.  Pt does not adhere to specific diet at home.  Pt reports consuming 2 meals daily (does not eat breakfast).  Pt reports to usually eating 1-2 snacks daily.  Consumes 4-5 beers & 5 cigarettes at home daily. Noted sign wt reported wt gain 11kg x 1 day due to sand bag on chest s/p sx.  S/p extubated 1/23. S/p 1/23 I&D abdominal wall hematoma

## 2017-01-24 NOTE — CHART NOTE - NSCHARTNOTEFT_GEN_A_CORE
Called by nurse to assess bleeding from surgical site. Dressing saturated w/ blood, blood on gown. /89  O2 100% on AC. 1unit PRBC ordered. Call placed to Dr. Jones and made aware. Recommends replacing sandbag on site. States that he will see.

## 2017-01-24 NOTE — DIETITIAN INITIAL EVALUATION ADULT. - PERTINENT MEDS FT
Albuterol, Ibuprofen, Cyclobenzaprine, lisinopril-hydrochlorothiazide, Albuterol, Ibuprofen, Cyclobenzaprine, lisinopril-hydrochlorothiazide IVF lactated ringers @ 100ml/hr, Hibiclens, Vanco, Zosyn, Morphine

## 2017-01-24 NOTE — DIETITIAN INITIAL EVALUATION ADULT. - NS AS NUTRI INTERV MEALS SNACK
General/healthful diet DASH/TLC/General/healthful diet Fat - modified diet/Mineral - modified diet/DASH/TLC diet when medically appropriate Fiber - modified diet/Adv po diet when medically appropriate Low Na/Low fiber/Mineral - modified diet

## 2017-01-24 NOTE — DIETITIAN INITIAL EVALUATION ADULT. - NS FNS REASON FOR WEIGHT CHANG
NPO status NPO status/other (specify) other (specify)/NPO/Clear liquids x 2 days during hospitalization & poor po intake PTA/decreased po intake

## 2017-01-24 NOTE — DIETITIAN INITIAL EVALUATION ADULT. - NS AS NUTRI INTERV MEDICAL AND FOOD SUPPLEMENTS
Commercial beverage/Ensure Enlive 1 x daily (provides 350kcal & 20g pro) Commercial beverage/Ensure Clear 2 x daily (provides 200kcal & 7gm pro)
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
## 2017-01-24 NOTE — PROGRESS NOTE ADULT - SUBJECTIVE AND OBJECTIVE BOX
Intubated, sedated  VSS, afebrile  no pressors    Abdomen soft, mild bloody ooze from wound.  Dressing removed, packed surgicel inferior portion wound;        no further bleeding  Patient examined 0730 and 1000 - no active bleeding noted at 1000; sandbag to remain on wound for now

## 2017-01-24 NOTE — DIETITIAN INITIAL EVALUATION ADULT. - PERTINENT LABORATORY DATA
01-24 Na n/a   Glu n/a   K+ n/a   Cr  n/a   BUN 8  Phos 2.8  Alb 2.5 <L>   PAB n/a   Hgb 12.9 g/dL<L> Hct 35.5 %<L> WBC 8.5, Mg 2.0, K+ 3.4 <L>, Cl 101, AST 30, ALT 36

## 2017-01-24 NOTE — DIETITIAN INITIAL EVALUATION ADULT. - FACTORS AFF FOOD INTAKE
consumption of 4-5 beers daily (noted on chart) consumption of 4-5 beers daily (noted on chart)/other (specify) Abdominal pain, distended abdomen & hypoactive bowels & hx ETOHA/other (specify)/pain

## 2017-01-24 NOTE — PHYSICAL THERAPY INITIAL EVALUATION ADULT - GENERAL OBSERVATIONS, REHAB EVAL
Patient seen supine in bed with dupont, with nasal canula O2 at 2L/minute with 10lb sadbag over abdominal incision.

## 2017-01-24 NOTE — DIETITIAN INITIAL EVALUATION ADULT. - SIGNS/SYMPTOMS
2kg wt. loss in 3 weeks, Pt report of consuming only 2 meals daily, surgery on abdomen 3% wt. loss in 1 month, Pt report of consuming only 2 meals daily PTA NPO/Clear liquids x 2 days; distended abdomen & hypoactive bowels

## 2017-01-24 NOTE — PHYSICAL THERAPY INITIAL EVALUATION ADULT - PERTINENT HX OF CURRENT PROBLEM, REHAB EVAL
Patient admitted to hospital secondary to complaints of abdominal pain secondary to abdominal wall abscess.

## 2017-01-24 NOTE — DIETITIAN INITIAL EVALUATION ADULT. - PROBLEM SELECTOR PLAN 1
admit, NPO, IV abx, IV hydration, f/u labs, DVT ppx, pain management PRN, OR planning for 1/23 pending surgical consult

## 2017-01-24 NOTE — DIETITIAN INITIAL EVALUATION ADULT. - NUTRITIONGOAL OUTCOME1
Pt. to consume >75% of meals and supplement when medically appropriate; Gain 2kg of wt When medically appropriate, Pt. to consume >75% of meals and supplement; Gain 2kg to return to UBW Pt. to tolerate po diet & consume >75% of meals/supplement; Maintain stable wt +/-2#

## 2017-01-25 LAB
-  AMPICILLIN/SULBACTAM: SIGNIFICANT CHANGE UP
-  CEFAZOLIN: SIGNIFICANT CHANGE UP
-  CIPROFLOXACIN: SIGNIFICANT CHANGE UP
-  CLINDAMYCIN: SIGNIFICANT CHANGE UP
-  ERYTHROMYCIN: SIGNIFICANT CHANGE UP
-  GENTAMICIN: SIGNIFICANT CHANGE UP
-  LEVOFLOXACIN: SIGNIFICANT CHANGE UP
-  MOXIFLOXACIN(AEROBIC): SIGNIFICANT CHANGE UP
-  OXACILLIN: SIGNIFICANT CHANGE UP
-  PENICILLIN: SIGNIFICANT CHANGE UP
-  RIFAMPIN: SIGNIFICANT CHANGE UP
-  TETRACYCLINE: SIGNIFICANT CHANGE UP
-  TRIMETHOPRIM/SULFAMETHOXAZOLE: SIGNIFICANT CHANGE UP
-  VANCOMYCIN: SIGNIFICANT CHANGE UP
ANION GAP SERPL CALC-SCNC: 11 MMOL/L — SIGNIFICANT CHANGE UP (ref 5–17)
BUN SERPL-MCNC: 7 MG/DL — SIGNIFICANT CHANGE UP (ref 7–23)
CALCIUM SERPL-MCNC: 7.9 MG/DL — LOW (ref 8.5–10.1)
CHLORIDE SERPL-SCNC: 99 MMOL/L — SIGNIFICANT CHANGE UP (ref 96–108)
CO2 SERPL-SCNC: 26 MMOL/L — SIGNIFICANT CHANGE UP (ref 22–31)
CREAT SERPL-MCNC: 0.74 MG/DL — SIGNIFICANT CHANGE UP (ref 0.5–1.3)
GLUCOSE SERPL-MCNC: 85 MG/DL — SIGNIFICANT CHANGE UP (ref 70–99)
HCT VFR BLD CALC: 31.8 % — LOW (ref 39–50)
HCT VFR BLD CALC: 31.9 % — LOW (ref 39–50)
HCT VFR BLD CALC: 32.2 % — LOW (ref 39–50)
HCT VFR BLD CALC: 32.4 % — LOW (ref 39–50)
HGB BLD-MCNC: 11.7 G/DL — LOW (ref 13–17)
HGB BLD-MCNC: 11.7 G/DL — LOW (ref 13–17)
HGB BLD-MCNC: 11.9 G/DL — LOW (ref 13–17)
HGB BLD-MCNC: 12 G/DL — LOW (ref 13–17)
MAGNESIUM SERPL-MCNC: 1.7 MG/DL — LOW (ref 1.8–2.4)
MCHC RBC-ENTMCNC: 33.2 PG — SIGNIFICANT CHANGE UP (ref 27–34)
MCHC RBC-ENTMCNC: 33.3 PG — SIGNIFICANT CHANGE UP (ref 27–34)
MCHC RBC-ENTMCNC: 33.4 PG — SIGNIFICANT CHANGE UP (ref 27–34)
MCHC RBC-ENTMCNC: 33.8 PG — SIGNIFICANT CHANGE UP (ref 27–34)
MCHC RBC-ENTMCNC: 36.5 GM/DL — HIGH (ref 32–36)
MCHC RBC-ENTMCNC: 36.9 GM/DL — HIGH (ref 32–36)
MCHC RBC-ENTMCNC: 36.9 GM/DL — HIGH (ref 32–36)
MCHC RBC-ENTMCNC: 37.3 GM/DL — HIGH (ref 32–36)
MCV RBC AUTO: 90 FL — SIGNIFICANT CHANGE UP (ref 80–100)
MCV RBC AUTO: 90.3 FL — SIGNIFICANT CHANGE UP (ref 80–100)
MCV RBC AUTO: 90.7 FL — SIGNIFICANT CHANGE UP (ref 80–100)
MCV RBC AUTO: 91.5 FL — SIGNIFICANT CHANGE UP (ref 80–100)
METHOD TYPE: SIGNIFICANT CHANGE UP
PHOSPHATE SERPL-MCNC: 2.6 MG/DL — SIGNIFICANT CHANGE UP (ref 2.5–4.5)
PLATELET # BLD AUTO: 152 K/UL — SIGNIFICANT CHANGE UP (ref 150–400)
PLATELET # BLD AUTO: 155 K/UL — SIGNIFICANT CHANGE UP (ref 150–400)
PLATELET # BLD AUTO: 159 K/UL — SIGNIFICANT CHANGE UP (ref 150–400)
PLATELET # BLD AUTO: 173 K/UL — SIGNIFICANT CHANGE UP (ref 150–400)
POTASSIUM SERPL-MCNC: 3.5 MMOL/L — SIGNIFICANT CHANGE UP (ref 3.5–5.3)
POTASSIUM SERPL-SCNC: 3.5 MMOL/L — SIGNIFICANT CHANGE UP (ref 3.5–5.3)
RBC # BLD: 3.51 M/UL — LOW (ref 4.2–5.8)
RBC # BLD: 3.52 M/UL — LOW (ref 4.2–5.8)
RBC # BLD: 3.53 M/UL — LOW (ref 4.2–5.8)
RBC # BLD: 3.59 M/UL — LOW (ref 4.2–5.8)
RBC # FLD: 11.9 % — SIGNIFICANT CHANGE UP (ref 11–15)
RBC # FLD: 12 % — SIGNIFICANT CHANGE UP (ref 11–15)
RBC # FLD: 12.2 % — SIGNIFICANT CHANGE UP (ref 11–15)
RBC # FLD: 12.9 % — SIGNIFICANT CHANGE UP (ref 11–15)
SODIUM SERPL-SCNC: 136 MMOL/L — SIGNIFICANT CHANGE UP (ref 135–145)
VANCOMYCIN TROUGH SERPL-MCNC: 7.3 UG/ML — LOW (ref 10–20)
WBC # BLD: 6.8 K/UL — SIGNIFICANT CHANGE UP (ref 3.8–10.5)
WBC # BLD: 7.4 K/UL — SIGNIFICANT CHANGE UP (ref 3.8–10.5)
WBC # BLD: 7.6 K/UL — SIGNIFICANT CHANGE UP (ref 3.8–10.5)
WBC # BLD: 9.3 K/UL — SIGNIFICANT CHANGE UP (ref 3.8–10.5)
WBC # FLD AUTO: 6.8 K/UL — SIGNIFICANT CHANGE UP (ref 3.8–10.5)
WBC # FLD AUTO: 7.4 K/UL — SIGNIFICANT CHANGE UP (ref 3.8–10.5)
WBC # FLD AUTO: 7.6 K/UL — SIGNIFICANT CHANGE UP (ref 3.8–10.5)
WBC # FLD AUTO: 9.3 K/UL — SIGNIFICANT CHANGE UP (ref 3.8–10.5)

## 2017-01-25 PROCEDURE — 99232 SBSQ HOSP IP/OBS MODERATE 35: CPT

## 2017-01-25 RX ORDER — METOPROLOL TARTRATE 50 MG
50 TABLET ORAL
Qty: 0 | Refills: 0 | Status: DISCONTINUED | OUTPATIENT
Start: 2017-01-25 | End: 2017-02-04

## 2017-01-25 RX ORDER — POTASSIUM CHLORIDE 20 MEQ
10 PACKET (EA) ORAL ONCE
Qty: 0 | Refills: 0 | Status: COMPLETED | OUTPATIENT
Start: 2017-01-25 | End: 2017-01-25

## 2017-01-25 RX ORDER — MORPHINE SULFATE 50 MG/1
2 CAPSULE, EXTENDED RELEASE ORAL ONCE
Qty: 0 | Refills: 0 | Status: DISCONTINUED | OUTPATIENT
Start: 2017-01-25 | End: 2017-01-25

## 2017-01-25 RX ORDER — MAGNESIUM SULFATE 500 MG/ML
1 VIAL (ML) INJECTION ONCE
Qty: 0 | Refills: 0 | Status: COMPLETED | OUTPATIENT
Start: 2017-01-25 | End: 2017-01-25

## 2017-01-25 RX ADMIN — CHLORHEXIDINE GLUCONATE 1 APPLICATION(S): 213 SOLUTION TOPICAL at 12:03

## 2017-01-25 RX ADMIN — Medication 100 GRAM(S): at 06:05

## 2017-01-25 RX ADMIN — Medication 25 MILLIGRAM(S): at 05:48

## 2017-01-25 RX ADMIN — Medication 250 MILLIGRAM(S): at 00:37

## 2017-01-25 RX ADMIN — MORPHINE SULFATE 2 MILLIGRAM(S): 50 CAPSULE, EXTENDED RELEASE ORAL at 20:07

## 2017-01-25 RX ADMIN — MORPHINE SULFATE 2 MILLIGRAM(S): 50 CAPSULE, EXTENDED RELEASE ORAL at 08:05

## 2017-01-25 RX ADMIN — Medication 100 MILLIEQUIVALENT(S): at 06:05

## 2017-01-25 RX ADMIN — MORPHINE SULFATE 2 MILLIGRAM(S): 50 CAPSULE, EXTENDED RELEASE ORAL at 19:37

## 2017-01-25 RX ADMIN — PIPERACILLIN AND TAZOBACTAM 25 GRAM(S): 4; .5 INJECTION, POWDER, LYOPHILIZED, FOR SOLUTION INTRAVENOUS at 14:08

## 2017-01-25 RX ADMIN — Medication 50 MILLIGRAM(S): at 17:32

## 2017-01-25 RX ADMIN — MORPHINE SULFATE 2 MILLIGRAM(S): 50 CAPSULE, EXTENDED RELEASE ORAL at 08:40

## 2017-01-25 RX ADMIN — MORPHINE SULFATE 2 MILLIGRAM(S): 50 CAPSULE, EXTENDED RELEASE ORAL at 06:17

## 2017-01-25 RX ADMIN — PIPERACILLIN AND TAZOBACTAM 25 GRAM(S): 4; .5 INJECTION, POWDER, LYOPHILIZED, FOR SOLUTION INTRAVENOUS at 22:17

## 2017-01-25 RX ADMIN — SODIUM CHLORIDE 100 MILLILITER(S): 9 INJECTION, SOLUTION INTRAVENOUS at 03:30

## 2017-01-25 RX ADMIN — PIPERACILLIN AND TAZOBACTAM 25 GRAM(S): 4; .5 INJECTION, POWDER, LYOPHILIZED, FOR SOLUTION INTRAVENOUS at 05:48

## 2017-01-25 RX ADMIN — MORPHINE SULFATE 2 MILLIGRAM(S): 50 CAPSULE, EXTENDED RELEASE ORAL at 06:02

## 2017-01-25 RX ADMIN — Medication 250 MILLIGRAM(S): at 12:07
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
## 2017-01-25 NOTE — CHART NOTE - NSCHARTNOTEFT_GEN_A_CORE
Patient HD stable throughout ICU stay. CBC levels stable since transfusion yesterday. He is now stable for transfer to surgical floor. Sign-out given to JASMIN Delgadillo covering surgery.

## 2017-01-25 NOTE — PROGRESS NOTE ADULT - SUBJECTIVE AND OBJECTIVE BOX
Patient seen and examined at bedside in ICU, pt in no distress. Pt reports intermittent abdominal pain, well controlled with pain medication. Admits to flatus. Denies fever, chills, chest pain, sob, headache, dizziness.     Vital Signs Last 24 Hrs  T(F): 100, Max: 101.4 (01-24 @ 16:00)  HR: 91  BP: 145/97  RR: 23  SpO2: 97%    GENERAL: Alert, oriented, NAD  CHEST/LUNG: Clear to auscultation bilaterally, respirations nonlabored  HEART: S1S2, regular rate and rhythm  ABDOMEN: soft, nondistended. Abdominal binder removed to reveal 10lb sandbag in place. Dressing with minimal bloody spotting. Outer dressing removed, no active bleeding noted. Gauze reapplied and pressure dressing reapplied with 10lb sandbag and abd binder. Pt tolerated well.   EXTREMITIES:  no calf tenderness, no edema b/l, SCDs in place  : Dupont in situ draining clear, yellow urine-output: 1365cc/24hrs        LABS:                        11.7   6.8   )-----------( 155      ( 25 Jan 2017 05:08 )             31.8     25 Jan 2017 03:59    136    |  99     |  7      ----------------------------<  85     3.5     |  26     |  0.74     Ca    7.9        25 Jan 2017 03:59  Phos  2.6       25 Jan 2017 03:59  Mg     1.7       25 Jan 2017 03:59    TPro  5.9    /  Alb  2.5    /  TBili  1.2    /  DBili  x      /  AST  30     /  ALT  26     /  AlkPhos  66     24 Jan 2017 05:33    PT/INR - ( 24 Jan 2017 12:57 )   PT: 13.1 sec;   INR: 1.17 ratio         PTT - ( 24 Jan 2017 12:57 )  PTT:42.4 sec    Surgical culture: numerous staphylococcus species    Impression: 56 year old male PMH HTN, Asthma, ETOH abuse a/w abdominal wall abscess, POD# 2 s/p incision and drainage of abdominal wall infected hematoma, evacuation of clots, removal of suture foreign body and subsequent return to OR for control of wound hemorrhage, s/p 3uPRBC and 2uFFP, extubated yesterday    Plan:  - continue to monitor wound for bleeding, continue with dressing changes PRN  - continue with sandbag, remove every 3 hrs for 10 mins  - follow H/H, currently stable  - pain management PRN  - increase ambulation as tolerated  - c/w clear liquid diet; will advance as tolerated  - d/c dupont?   - DVT ppx with SCDs  - continue with zosyn & vanco  - continue CCU management  - will d/w surgical attending

## 2017-01-26 ENCOUNTER — RESULT REVIEW (OUTPATIENT)
Age: 57
End: 2017-01-26

## 2017-01-26 DIAGNOSIS — Z22.321 CARRIER OR SUSPECTED CARRIER OF METHICILLIN SUSCEPTIBLE STAPHYLOCOCCUS AUREUS: ICD-10-CM

## 2017-01-26 DIAGNOSIS — D50.0 IRON DEFICIENCY ANEMIA SECONDARY TO BLOOD LOSS (CHRONIC): ICD-10-CM

## 2017-01-26 DIAGNOSIS — K59.01 SLOW TRANSIT CONSTIPATION: ICD-10-CM

## 2017-01-26 LAB
APPEARANCE UR: CLEAR — SIGNIFICANT CHANGE UP
APTT BLD: 43.7 SEC — HIGH (ref 27.5–37.4)
BACTERIA # UR AUTO: ABNORMAL
BILIRUB UR-MCNC: NEGATIVE — SIGNIFICANT CHANGE UP
COLOR SPEC: YELLOW — SIGNIFICANT CHANGE UP
DIFF PNL FLD: ABNORMAL
EPI CELLS # UR: NEGATIVE — SIGNIFICANT CHANGE UP
GLUCOSE UR QL: NEGATIVE MG/DL — SIGNIFICANT CHANGE UP
HCT VFR BLD CALC: 31.5 % — LOW (ref 39–50)
HGB BLD-MCNC: 11.5 G/DL — LOW (ref 13–17)
INR BLD: 1.17 RATIO — HIGH (ref 0.88–1.16)
KETONES UR-MCNC: NEGATIVE — SIGNIFICANT CHANGE UP
LEUKOCYTE ESTERASE UR-ACNC: NEGATIVE — SIGNIFICANT CHANGE UP
MCHC RBC-ENTMCNC: 33.9 PG — SIGNIFICANT CHANGE UP (ref 27–34)
MCHC RBC-ENTMCNC: 36.4 GM/DL — HIGH (ref 32–36)
MCV RBC AUTO: 93.2 FL — SIGNIFICANT CHANGE UP (ref 80–100)
NITRITE UR-MCNC: NEGATIVE — SIGNIFICANT CHANGE UP
PH UR: 6 — SIGNIFICANT CHANGE UP (ref 4.8–8)
PLATELET # BLD AUTO: 150 K/UL — SIGNIFICANT CHANGE UP (ref 150–400)
PROT UR-MCNC: 100 MG/DL
PROTHROM AB SERPL-ACNC: 13.1 SEC — SIGNIFICANT CHANGE UP (ref 10–13.1)
RBC # BLD: 3.38 M/UL — LOW (ref 4.2–5.8)
RBC # FLD: 12.1 % — SIGNIFICANT CHANGE UP (ref 11–15)
RBC CASTS # UR COMP ASSIST: ABNORMAL /HPF (ref 0–4)
SP GR SPEC: 1 — LOW (ref 1.01–1.02)
UROBILINOGEN FLD QL: NEGATIVE MG/DL — SIGNIFICANT CHANGE UP
WBC # BLD: 7.1 K/UL — SIGNIFICANT CHANGE UP (ref 3.8–10.5)
WBC # FLD AUTO: 7.1 K/UL — SIGNIFICANT CHANGE UP (ref 3.8–10.5)
WBC UR QL: SIGNIFICANT CHANGE UP

## 2017-01-26 PROCEDURE — 99223 1ST HOSP IP/OBS HIGH 75: CPT

## 2017-01-26 PROCEDURE — 99233 SBSQ HOSP IP/OBS HIGH 50: CPT

## 2017-01-26 RX ORDER — CEFAZOLIN SODIUM 1 G
2000 VIAL (EA) INJECTION EVERY 8 HOURS
Qty: 0 | Refills: 0 | Status: DISCONTINUED | OUTPATIENT
Start: 2017-01-26 | End: 2017-01-29

## 2017-01-26 RX ORDER — SENNA PLUS 8.6 MG/1
2 TABLET ORAL AT BEDTIME
Qty: 0 | Refills: 0 | Status: DISCONTINUED | OUTPATIENT
Start: 2017-01-26 | End: 2017-02-04

## 2017-01-26 RX ORDER — SODIUM CHLORIDE 9 MG/ML
1000 INJECTION INTRAMUSCULAR; INTRAVENOUS; SUBCUTANEOUS
Qty: 0 | Refills: 0 | Status: DISCONTINUED | OUTPATIENT
Start: 2017-01-27 | End: 2017-02-01

## 2017-01-26 RX ORDER — DOCUSATE SODIUM 100 MG
100 CAPSULE ORAL THREE TIMES A DAY
Qty: 0 | Refills: 0 | Status: DISCONTINUED | OUTPATIENT
Start: 2017-01-26 | End: 2017-02-04

## 2017-01-26 RX ORDER — MORPHINE SULFATE 50 MG/1
4 CAPSULE, EXTENDED RELEASE ORAL EVERY 4 HOURS
Qty: 0 | Refills: 0 | Status: DISCONTINUED | OUTPATIENT
Start: 2017-01-26 | End: 2017-01-29

## 2017-01-26 RX ADMIN — Medication 100 MILLIGRAM(S): at 21:38

## 2017-01-26 RX ADMIN — Medication 650 MILLIGRAM(S): at 00:03

## 2017-01-26 RX ADMIN — PIPERACILLIN AND TAZOBACTAM 25 GRAM(S): 4; .5 INJECTION, POWDER, LYOPHILIZED, FOR SOLUTION INTRAVENOUS at 06:35

## 2017-01-26 RX ADMIN — Medication 250 MILLIGRAM(S): at 14:09

## 2017-01-26 RX ADMIN — Medication 50 MILLIGRAM(S): at 06:36

## 2017-01-26 RX ADMIN — Medication 650 MILLIGRAM(S): at 15:12

## 2017-01-26 RX ADMIN — Medication 50 MILLIGRAM(S): at 17:17

## 2017-01-26 RX ADMIN — PIPERACILLIN AND TAZOBACTAM 25 GRAM(S): 4; .5 INJECTION, POWDER, LYOPHILIZED, FOR SOLUTION INTRAVENOUS at 14:09

## 2017-01-26 RX ADMIN — Medication 250 MILLIGRAM(S): at 02:23

## 2017-01-26 RX ADMIN — Medication 5 MILLIGRAM(S): at 21:38

## 2017-01-26 RX ADMIN — SENNA PLUS 2 TABLET(S): 8.6 TABLET ORAL at 21:47

## 2017-01-26 RX ADMIN — Medication 100 MILLIGRAM(S): at 14:10
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
## 2017-01-26 NOTE — PROGRESS NOTE ADULT - SUBJECTIVE AND OBJECTIVE BOX
Patient seen and examine at bedside.   Postop pain/Abdominal pain continues to improve. Pain is currently a 6.5/10.    Tolerating diet. Denies nausea or vomiting.  Last bowel movement on 11/20/17, +flatus. Voiding normally.  Denies chest pain or SOB.   Sandbags/pressure hemostasis  discontinued and no active bleeding noted     Vital Signs Last 24 Hrs  T(C): 37.7, Max: 38.4 (01-25 @ 23:54)  T(F): 99.8, Max: 101.2 (01-25 @ 23:54)  HR: 97 (91 - 112)  BP: 120/75 (117/82 - 157/113)  BP(mean): 90 (90 - 123)  RR: 17 (16 - 27)  SpO2: 97% (95% - 98%)    MEDICATIONS  (STANDING):  influenza   Vaccine 0.5milliLiter(s) IntraMuscular once  piperacillin/tazobactam IVPB. 3.375Gram(s) IV Intermittent every 8 hours  vancomycin  IVPB 1000milliGRAM(s) IV Intermittent every 12 hours  chlorhexidine 4% Liquid 1Application(s) Topical daily  metoprolol 50milliGRAM(s) Oral two times a day    MEDICATIONS  (PRN):  morphine  - Injectable 2milliGRAM(s) IV Push every 6 hours PRN Moderate Pain (4 - 6)  acetaminophen   Tablet 650milliGRAM(s) Oral every 6 hours PRN For Temp greater than 38 C (100.4 F)      PHYSICAL EXAM:    GENERAL: NAD  HEAD: Atraumatic, Normocephalic  EYES: conjunctiva and sclera clear  CHEST/LUNG: Clear to ausculation, bilaterally   HEART: S1S2 present. Tachycardic   ABDOMEN: non distended. Midline wound tranversing the umbilicus packed with blood soaked gauze.Gauze in wound not removed. No bloody or purulent drainage noted. Firm b/l hematomas present parallel to wound. Outer dressing replaced with 4x4 gauze and tape. Tolerated dressing change. +BS, soft, non tender  EXTREMITIES: calves soft, non tender. No LE edema       LABS:                        11.5   7.1   )-----------( 150      ( 26 Jan 2017 08:02 )             31.5     25 Jan 2017 03:59    136    |  99     |  7      ----------------------------<  85     3.5     |  26     |  0.74     Ca    7.9        25 Jan 2017 03:59  Phos  2.6       25 Jan 2017 03:59  Mg     1.7       25 Jan 2017 03:59      PT/INR - ( 26 Jan 2017 10:52 )   PT: 13.1 sec;   INR: 1.17 ratio         PTT - ( 26 Jan 2017 10:52 )  PTT:43.7 sec    Culture Results:   Numerous Staphylococcus aureus (01-23 @ 17:30)    type      Impression: 56 year old male PMH HTN, Asthma, ETOH abuse a/w abdominal wall abscess, POD# 3 s/p incision and drainage of abdominal wall infected hematoma, evacuation of clots, removal of suture foreign body and subsequent return to OR for control of wound hemorrhage, s/p 3uPRBC and 2uFFP. Patient tolerating pain well. H/H stable.       Plan:  -continue to monitor wound for bleeding; continue with dressing changes PRN  -follow H/H, stable. follow coagulation studies  -pain management PRN  -ambulation as tolerated  -continue normal diet  -DVT ppx w/ SCDs  -continue zosyn + vanco  -start bowel regimen  -will discuss with surgical attending re:   wound management  removal of packing / hem/onc consult         TheDONALDO Smith

## 2017-01-26 NOTE — PROGRESS NOTE ADULT - SUBJECTIVE AND OBJECTIVE BOX
Patient is a 56y old  Male who presents with a chief complaint of abdominal pain x 1 week (22 Jan 2017 14:02)      INTERVAL HPI/OVERNIGHT EVENTS:  pt. with complaints of abdominal pain and constipation (reports no BM for 6 days)    MEDICATIONS  (STANDING):  influenza   Vaccine 0.5milliLiter(s) IntraMuscular once  piperacillin/tazobactam IVPB. 3.375Gram(s) IV Intermittent every 8 hours  vancomycin  IVPB 1000milliGRAM(s) IV Intermittent every 12 hours  chlorhexidine 4% Liquid 1Application(s) Topical daily  metoprolol 50milliGRAM(s) Oral two times a day  senna 2Tablet(s) Oral at bedtime  docusate sodium 100milliGRAM(s) Oral three times a day  bisacodyl 5milliGRAM(s) Oral at bedtime    MEDICATIONS  (PRN):  acetaminophen   Tablet 650milliGRAM(s) Oral every 6 hours PRN For Temp greater than 38 C (100.4 F)  morphine  - Injectable 4milliGRAM(s) IV Push every 4 hours PRN Severe Pain (7 - 10)      Allergies    No Known Allergies    Intolerances        REVIEW OF SYSTEMS:  CONSTITUTIONAL: No fever, weight loss, or fatigue  EYES: No eye pain, visual disturbances, or discharge  ENMT:  No difficulty hearing, tinnitus, vertigo; No sinus or throat pain  NECK: No pain or stiffness  BREASTS: No pain, masses, or nipple discharge  RESPIRATORY: No cough, wheezing, chills or hemoptysis; No shortness of breath  CARDIOVASCULAR: No chest pain, palpitations, dizziness, or leg swelling  GASTROINTESTINAL: No abdominal or epigastric pain. No nausea, vomiting, or hematemesis; No diarrhea or constipation. No melena or hematochezia.  GENITOURINARY: No dysuria, frequency, hematuria, or incontinence  NEUROLOGICAL: No headaches, memory loss, loss of strength, numbness, or tremors  SKIN: No itching, burning, rashes, or lesions   LYMPH NODES: No enlarged glands  ENDOCRINE: No heat or cold intolerance; No hair loss  MUSCULOSKELETAL: No joint pain or swelling; No muscle, back, or extremity pain  PSYCHIATRIC: No depression, anxiety, mood swings, or difficulty sleeping  HEME/LYMPH: No easy bruising, or bleeding gums  ALLERGY AND IMMUNOLOGIC: No hives or eczema    Vital Signs Last 24 Hrs  T(C): 37.7, Max: 38.4 (01-25 @ 23:54)  T(F): 99.8, Max: 101.2 (01-25 @ 23:54)  HR: 97 (97 - 105)  BP: 120/75 (120/71 - 144/78)  BP(mean): --  RR: 17 (16 - 18)  SpO2: 97% (95% - 98%)    PHYSICAL EXAM:  GENERAL: NAD, well-groomed, well-developed  HEAD:  Atraumatic, Normocephalic  EYES: EOMI, PERRLA, conjunctiva and sclera clear  ENMT: No tonsillar erythema, exudates, or enlargement; Moist mucous membranes, Good dentition, No lesions  NECK: Supple, No JVD, Normal thyroid  NERVOUS SYSTEM:  Alert & Oriented X3, Good concentration; Motor Strength 5/5 B/L upper and lower extremities; DTRs 2+ intact and symmetric  CHEST/LUNG: Clear to percussion bilaterally; No rales, rhonchi, wheezing, or rubs  HEART: Regular rate and rhythm; No murmurs, rubs, or gallops  ABDOMEN: Soft, Nontender, Nondistended; Bowel sounds present  EXTREMITIES:  2+ Peripheral Pulses, No clubbing, cyanosis, or edema  LYMPH: No lymphadenopathy noted  SKIN: No rashes or lesions    LABS:                        11.5   7.1   )-----------( 150      ( 26 Jan 2017 08:02 )             31.5     25 Jan 2017 03:59    136    |  99     |  7      ----------------------------<  85     3.5     |  26     |  0.74     Ca    7.9        25 Jan 2017 03:59  Phos  2.6       25 Jan 2017 03:59  Mg     1.7       25 Jan 2017 03:59      PT/INR - ( 26 Jan 2017 10:52 )   PT: 13.1 sec;   INR: 1.17 ratio         PTT - ( 26 Jan 2017 10:52 )  PTT:43.7 sec    CAPILLARY BLOOD GLUCOSE      RADIOLOGY & ADDITIONAL TESTS:    Imaging Personally Reviewed:  [ ] YES  [ ] NO    Consultant(s) Notes Reviewed:  [ ] YES  [ ] NO    Care Discussed with Consultants/Other Providers [ ] YES  [ ] NO Patient is a 56y old  Male who presents with a chief complaint of abdominal pain x 1 week (22 Jan 2017 14:02)      INTERVAL HPI/OVERNIGHT EVENTS:  pt. downgraded to med/durg floors from ICU  pt. with complaints of abdominal pain and constipation (reports no BM for 6 days)    MEDICATIONS  (STANDING):  influenza   Vaccine 0.5milliLiter(s) IntraMuscular once  piperacillin/tazobactam IVPB. 3.375Gram(s) IV Intermittent every 8 hours  vancomycin  IVPB 1000milliGRAM(s) IV Intermittent every 12 hours  chlorhexidine 4% Liquid 1Application(s) Topical daily  metoprolol 50milliGRAM(s) Oral two times a day  senna 2Tablet(s) Oral at bedtime  docusate sodium 100milliGRAM(s) Oral three times a day  bisacodyl 5milliGRAM(s) Oral at bedtime    MEDICATIONS  (PRN):  acetaminophen   Tablet 650milliGRAM(s) Oral every 6 hours PRN For Temp greater than 38 C (100.4 F)  morphine  - Injectable 4milliGRAM(s) IV Push every 4 hours PRN Severe Pain (7 - 10)      Allergies    No Known Allergies    Intolerances        REVIEW OF SYSTEMS:  CONSTITUTIONAL: +fevers weight loss, or fatigue  EYES: No eye pain, visual disturbances, or discharge  ENMT:  No difficulty hearing, tinnitus, vertigo; No sinus or throat pain  NECK: No pain or stiffness  BREASTS: No pain, masses, or nipple discharge  RESPIRATORY: No cough, wheezing, chills or hemoptysis; No shortness of breath  CARDIOVASCULAR: No chest pain, palpitations, dizziness, or leg,   GI: +constipation, +flatus, +abdominal pain at surgical site  GENITOURINARY: No dysuria, frequency, hematuria, or incontinence  NEUROLOGICAL: No headaches, memory loss, loss of strength, numbness, or tremors  SKIN: No itching, burning, rashes, or lesions   LYMPH NODES: No enlarged glands  ENDOCRINE: No heat or cold intolerance; No hair loss  MUSCULOSKELETAL: No joint pain or swelling; No muscle, back, or extremity pain  PSYCHIATRIC: No depression, anxiety, mood swings, or difficulty sleeping  HEME/LYMPH: No easy bruising, or bleeding gums  ALLERGY AND IMMUNOLOGIC: No hives or eczema    Vital Signs Last 24 Hrs  T(C): 37.7, Max: 38.4 (01-25 @ 23:54)  T(F): 99.8, Max: 101.2 (01-25 @ 23:54)  HR: 97 (97 - 105)  BP: 120/75 (120/71 - 144/78)  BP(mean): --  RR: 17 (16 - 18)  SpO2: 97% (95% - 98%)    PHYSICAL EXAM:  GENERAL: mild distress due to pain, well-groomed, well-developed  HEAD:  Atraumatic, Normocephalic  EYES: EOMI, PERRLA, conjunctiva and sclera clear  ENMT: No tonsillar erythema, exudates, or enlargement; Moist mucous membranes, Good dentition, No lesions  NECK: Supple, No JVD, Normal thyroid  NERVOUS SYSTEM:  Alert & Oriented X3, Good concentration; Motor Strength 5/5 B/L upper and lower extremities; DTRs 2+ intact and symmetric  CHEST/LUNG: Clear to percussion bilaterally; No rales, rhonchi, wheezing, or rubs  HEART: Regular rate and rhythm; No murmurs, rubs, or gallops  ABDOMEN: soft, TTP, dressing c/d/i  Bowel sounds present  EXTREMITIES:  2+ Peripheral Pulses, No clubbing, cyanosis, or edema  LYMPH: No lymphadenopathy noted  SKIN: No rashes or lesions    LABS:                        11.5   7.1   )-----------( 150      ( 26 Jan 2017 08:02 )             31.5     25 Jan 2017 03:59    136    |  99     |  7      ----------------------------<  85     3.5     |  26     |  0.74     Ca    7.9        25 Jan 2017 03:59  Phos  2.6       25 Jan 2017 03:59  Mg     1.7       25 Jan 2017 03:59      PT/INR - ( 26 Jan 2017 10:52 )   PT: 13.1 sec;   INR: 1.17 ratio         PTT - ( 26 Jan 2017 10:52 )  PTT:43.7 sec    CAPILLARY BLOOD GLUCOSE      RADIOLOGY & ADDITIONAL TESTS:    Imaging Personally Reviewed:  [ x] YES  [ ] NO    Consultant(s) Notes Reviewed:  [ x] YES  [ ] NO    Care Discussed with Consultants/Other Providers [x ] YES  [ ] NO

## 2017-01-26 NOTE — PROGRESS NOTE ADULT - PROBLEM SELECTOR PLAN 6
agree with colace and senna, add dulcolax given overnight given 6 days, opiates and post-operative state

## 2017-01-27 ENCOUNTER — APPOINTMENT (OUTPATIENT)
Dept: SURGERY | Facility: HOSPITAL | Age: 57
End: 2017-01-27

## 2017-01-27 LAB — BLD GP AB SCN SERPL QL: SIGNIFICANT CHANGE UP

## 2017-01-27 PROCEDURE — 88304 TISSUE EXAM BY PATHOLOGIST: CPT | Mod: 26

## 2017-01-27 RX ORDER — HEPARIN SODIUM 5000 [USP'U]/ML
5000 INJECTION INTRAVENOUS; SUBCUTANEOUS EVERY 12 HOURS
Qty: 0 | Refills: 0 | Status: DISCONTINUED | OUTPATIENT
Start: 2017-01-28 | End: 2017-02-04

## 2017-01-27 RX ORDER — MORPHINE SULFATE 50 MG/1
2 CAPSULE, EXTENDED RELEASE ORAL EVERY 6 HOURS
Qty: 0 | Refills: 0 | Status: DISCONTINUED | OUTPATIENT
Start: 2017-01-28 | End: 2017-01-28

## 2017-01-27 RX ORDER — PIPERACILLIN AND TAZOBACTAM 4; .5 G/20ML; G/20ML
3.38 INJECTION, POWDER, LYOPHILIZED, FOR SOLUTION INTRAVENOUS EVERY 8 HOURS
Qty: 0 | Refills: 0 | Status: DISCONTINUED | OUTPATIENT
Start: 2017-01-28 | End: 2017-01-29

## 2017-01-27 RX ORDER — SODIUM CHLORIDE 9 MG/ML
1000 INJECTION, SOLUTION INTRAVENOUS
Qty: 0 | Refills: 0 | Status: DISCONTINUED | OUTPATIENT
Start: 2017-01-27 | End: 2017-01-29

## 2017-01-27 RX ORDER — MORPHINE SULFATE 50 MG/1
4 CAPSULE, EXTENDED RELEASE ORAL ONCE
Qty: 0 | Refills: 0 | Status: DISCONTINUED | OUTPATIENT
Start: 2017-01-27 | End: 2017-02-04

## 2017-01-27 RX ORDER — SODIUM CHLORIDE 9 MG/ML
1000 INJECTION, SOLUTION INTRAVENOUS
Qty: 0 | Refills: 0 | Status: DISCONTINUED | OUTPATIENT
Start: 2017-01-28 | End: 2017-01-29

## 2017-01-27 RX ADMIN — SODIUM CHLORIDE 75 MILLILITER(S): 9 INJECTION INTRAMUSCULAR; INTRAVENOUS; SUBCUTANEOUS at 05:32

## 2017-01-27 RX ADMIN — Medication 650 MILLIGRAM(S): at 05:32

## 2017-01-27 RX ADMIN — Medication 100 MILLIGRAM(S): at 21:47

## 2017-01-27 RX ADMIN — CHLORHEXIDINE GLUCONATE 1 APPLICATION(S): 213 SOLUTION TOPICAL at 15:34

## 2017-01-27 RX ADMIN — SODIUM CHLORIDE 75 MILLILITER(S): 9 INJECTION, SOLUTION INTRAVENOUS at 17:40

## 2017-01-27 RX ADMIN — Medication 5 MILLIGRAM(S): at 21:47

## 2017-01-27 RX ADMIN — MORPHINE SULFATE 4 MILLIGRAM(S): 50 CAPSULE, EXTENDED RELEASE ORAL at 09:11

## 2017-01-27 RX ADMIN — Medication 100 MILLIGRAM(S): at 15:36

## 2017-01-27 RX ADMIN — SENNA PLUS 2 TABLET(S): 8.6 TABLET ORAL at 21:47

## 2017-01-27 RX ADMIN — Medication 100 MILLIGRAM(S): at 05:32

## 2017-01-27 RX ADMIN — Medication 100 MILLIGRAM(S): at 15:35

## 2017-01-27 RX ADMIN — Medication 50 MILLIGRAM(S): at 05:32

## 2017-01-27 RX ADMIN — MORPHINE SULFATE 4 MILLIGRAM(S): 50 CAPSULE, EXTENDED RELEASE ORAL at 10:11

## 2017-01-27 RX ADMIN — Medication 50 MILLIGRAM(S): at 17:28

## 2017-01-27 NOTE — BRIEF OPERATIVE NOTE - POST-OP DX
Abdominal wall abscess  01/23/2017    Active  Radha Umanozr  Abdominal wall hematoma, subsequent encounter  01/27/2017    Active  Doyle Hernandez Abdominal wall abscess  01/23/2017    Active  Radha Umanzor  Abdominal wall hematoma, subsequent encounter  01/27/2017    Active  Doyle Hernandez

## 2017-01-27 NOTE — BRIEF OPERATIVE NOTE - PRE-OP DX
Abdominal wall abscess  01/23/2017    Active  Radha Umanzor
Abdominal wall hematoma, subsequent encounter  01/24/2017  with hemorrhage  Active  Crystal Jean
Abdominal wall hematoma, subsequent encounter  01/24/2017  with hemorrhage  Active  Crystal Jean

## 2017-01-27 NOTE — PROGRESS NOTE ADULT - SUBJECTIVE AND OBJECTIVE BOX
Patient seen and examined bedside resting comfortably. No acute issue overnight, denies further bleeding from  abdominal wound. Denies nausea, vomiting. Tolerating diet. Denies chest pain, dyspnea, cough.    Vital Signs Last 24 Hrs  T(C): 36.7, Max: 38.2 ( @ 17:16)  T(F): 98, Max: 100.8 ( @ 17:16)  HR: 91 (83 - 95)  BP: 101/74 (101/74 - 145/87)  BP(mean): --  RR: 14 (14 - 17)  SpO2: 98% (96% - 98%)  I&O's Summary    I & Os for current day (as of 2017 11:20)  =============================================  IN: 1140 ml / OUT: 200 ml / NET: 940 ml      PHYSICAL EXAM:    GENERAL: Alert & Oriented X3,NAD, well-groomed, well-developed  CHEST/LUNG: Clear to percussion bilaterally; No rales, rhonchi, wheezing, or rubs  HEART: Regular rate and rhythm; No murmurs, rubs, or gallops  ABDOMEN: Nondistended; Bowel sounds present, pressure dressing in place with dried blood stains, dressing removed, old hemostatic packing removed and wound irrigated with ~800cc NS, no active bleeding or purulent discharge appreciated, wound repacked with moist cathy and clean dry pressure dressing reapplied, pt tolerated well   EXTREMITIES:  No calf tenderness, No clubbing, cyanosis, or edema      LABS:                        11.5   7.1   )-----------( 150      ( 2017 08:02 )             31.5           PT/INR - ( 2017 10:52 )   PT: 13.1 sec;   INR: 1.17 ratio         PTT - ( 2017 10:52 )  PTT:43.7 sec  Urinalysis Basic - ( 2017 20:11 )    Color: Yellow / Appearance: Clear / S.005 / pH: x  Gluc: x / Ketone: Negative  / Bili: Negative / Urobili: Negative mg/dL   Blood: x / Protein: 100 mg/dL / Nitrite: Negative   Leuk Esterase: Negative / RBC: 3-5 /HPF / WBC 3-5   Sq Epi: x / Non Sq Epi: Negative / Bacteria: Few            RADIOLOGY & ADDITIONAL STUDIES:    Assessment: 56y Male w/ PMH HTN, Asthma, ETOH abuse a/w abdominal wall abscess, POD# 4 s/p incision and drainage of abdominal wall infected hematoma, evacuation of clots, removal of suture foreign body and subsequent return to OR for control of wound hemorrhage, H/H stable.         Plan:  -continue to monitor wound for bleeding; possible bedside wound vac placement vs OR for wound washout/debridement and Vac placement  -pain management PRN  -ambulation as tolerated  -npo, will resume diet if no OR  -DVT ppx w/ SCDs  -continue antibiotics  -will discuss with surgical attending re:   wound management  removal of packing / hem/onc consult

## 2017-01-28 LAB
ANION GAP SERPL CALC-SCNC: 8 MMOL/L — SIGNIFICANT CHANGE UP (ref 5–17)
BUN SERPL-MCNC: 33 MG/DL — HIGH (ref 7–23)
CALCIUM SERPL-MCNC: 8.1 MG/DL — LOW (ref 8.5–10.1)
CHLORIDE SERPL-SCNC: 100 MMOL/L — SIGNIFICANT CHANGE UP (ref 96–108)
CO2 SERPL-SCNC: 27 MMOL/L — SIGNIFICANT CHANGE UP (ref 22–31)
CREAT SERPL-MCNC: 7.32 MG/DL — HIGH (ref 0.5–1.3)
CULTURE RESULTS: SIGNIFICANT CHANGE UP
GLUCOSE SERPL-MCNC: 119 MG/DL — HIGH (ref 70–99)
HCT VFR BLD CALC: 32.8 % — LOW (ref 39–50)
HGB BLD-MCNC: 12 G/DL — LOW (ref 13–17)
INR BLD: 1.18 RATIO — HIGH (ref 0.88–1.16)
MAGNESIUM SERPL-MCNC: 2.2 MG/DL — SIGNIFICANT CHANGE UP (ref 1.8–2.4)
MCHC RBC-ENTMCNC: 33.9 PG — SIGNIFICANT CHANGE UP (ref 27–34)
MCHC RBC-ENTMCNC: 36.5 GM/DL — HIGH (ref 32–36)
MCV RBC AUTO: 92.9 FL — SIGNIFICANT CHANGE UP (ref 80–100)
ORGANISM # SPEC MICROSCOPIC CNT: SIGNIFICANT CHANGE UP
ORGANISM # SPEC MICROSCOPIC CNT: SIGNIFICANT CHANGE UP
PHOSPHATE SERPL-MCNC: 4.3 MG/DL — SIGNIFICANT CHANGE UP (ref 2.5–4.5)
PLATELET # BLD AUTO: 225 K/UL — SIGNIFICANT CHANGE UP (ref 150–400)
POTASSIUM SERPL-MCNC: 3.9 MMOL/L — SIGNIFICANT CHANGE UP (ref 3.5–5.3)
POTASSIUM SERPL-SCNC: 3.9 MMOL/L — SIGNIFICANT CHANGE UP (ref 3.5–5.3)
PROTHROM AB SERPL-ACNC: 13.2 SEC — HIGH (ref 10–13.1)
RBC # BLD: 3.52 M/UL — LOW (ref 4.2–5.8)
RBC # FLD: 12.7 % — SIGNIFICANT CHANGE UP (ref 11–15)
SODIUM SERPL-SCNC: 135 MMOL/L — SIGNIFICANT CHANGE UP (ref 135–145)
SPECIMEN SOURCE: SIGNIFICANT CHANGE UP
WBC # BLD: 7.3 K/UL — SIGNIFICANT CHANGE UP (ref 3.8–10.5)
WBC # FLD AUTO: 7.3 K/UL — SIGNIFICANT CHANGE UP (ref 3.8–10.5)

## 2017-01-28 PROCEDURE — 99231 SBSQ HOSP IP/OBS SF/LOW 25: CPT

## 2017-01-28 RX ORDER — METOPROLOL TARTRATE 50 MG
5 TABLET ORAL EVERY 6 HOURS
Qty: 0 | Refills: 0 | Status: DISCONTINUED | OUTPATIENT
Start: 2017-01-28 | End: 2017-01-28

## 2017-01-28 RX ADMIN — SENNA PLUS 2 TABLET(S): 8.6 TABLET ORAL at 22:57

## 2017-01-28 RX ADMIN — HEPARIN SODIUM 5000 UNIT(S): 5000 INJECTION INTRAVENOUS; SUBCUTANEOUS at 06:24

## 2017-01-28 RX ADMIN — Medication 100 MILLIGRAM(S): at 22:58

## 2017-01-28 RX ADMIN — Medication 50 MILLIGRAM(S): at 06:17

## 2017-01-28 RX ADMIN — Medication 100 MILLIGRAM(S): at 06:17

## 2017-01-28 RX ADMIN — MORPHINE SULFATE 4 MILLIGRAM(S): 50 CAPSULE, EXTENDED RELEASE ORAL at 01:39

## 2017-01-28 RX ADMIN — MORPHINE SULFATE 4 MILLIGRAM(S): 50 CAPSULE, EXTENDED RELEASE ORAL at 01:24

## 2017-01-28 RX ADMIN — HEPARIN SODIUM 5000 UNIT(S): 5000 INJECTION INTRAVENOUS; SUBCUTANEOUS at 17:22

## 2017-01-28 RX ADMIN — Medication 100 MILLIGRAM(S): at 06:16

## 2017-01-28 RX ADMIN — Medication 100 MILLIGRAM(S): at 22:57

## 2017-01-28 RX ADMIN — PIPERACILLIN AND TAZOBACTAM 25 GRAM(S): 4; .5 INJECTION, POWDER, LYOPHILIZED, FOR SOLUTION INTRAVENOUS at 06:16

## 2017-01-28 RX ADMIN — Medication 100 MILLIGRAM(S): at 13:01

## 2017-01-28 RX ADMIN — PIPERACILLIN AND TAZOBACTAM 25 GRAM(S): 4; .5 INJECTION, POWDER, LYOPHILIZED, FOR SOLUTION INTRAVENOUS at 13:01

## 2017-01-28 RX ADMIN — Medication 5 MILLIGRAM(S): at 22:57

## 2017-01-28 RX ADMIN — PIPERACILLIN AND TAZOBACTAM 25 GRAM(S): 4; .5 INJECTION, POWDER, LYOPHILIZED, FOR SOLUTION INTRAVENOUS at 22:56

## 2017-01-28 RX ADMIN — SODIUM CHLORIDE 75 MILLILITER(S): 9 INJECTION, SOLUTION INTRAVENOUS at 06:17
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
## 2017-01-28 NOTE — PROGRESS NOTE ADULT - SUBJECTIVE AND OBJECTIVE BOX
INTERVAL HPI/OVERNIGHT EVENTS:  s/p wound debridement POD#2    Patient was seen at bedside resting comfortably. He admits to postop mild abdominal soreness at wound site. Wound vac intact.   Patient is tolerating regular diet with no nausea or vomiting. +flatus and BM. Denies any other discomforts.     Vital Signs Last 24 Hrs  T(C): 37.2, Max: 37.9 ( @ 17:57)  T(F): 99, Max: 100.2 ( @ 17:57)  HR: 76 (76 - 86)  BP: 131/75 (111/68 - 163/90)  BP(mean): --  RR: 14 (14 - 18)  SpO2: 97% (96% - 100%)    MEDICATIONS  (STANDING):  influenza   Vaccine 0.5milliLiter(s) IntraMuscular once  chlorhexidine 4% Liquid 1Application(s) Topical daily  metoprolol 50milliGRAM(s) Oral two times a day  senna 2Tablet(s) Oral at bedtime  docusate sodium 100milliGRAM(s) Oral three times a day  sodium chloride 0.9%. 1000milliLiter(s) IV Continuous <Continuous>  bisacodyl 5milliGRAM(s) Oral at bedtime  ceFAZolin   IVPB 2000milliGRAM(s) IV Intermittent every 8 hours  lactated ringers. 1000milliLiter(s) IV Continuous <Continuous>  morphine  - Injectable 4milliGRAM(s) IV Push once  heparin  Injectable 5000Unit(s) SubCutaneous every 12 hours  lactated ringers. 1000milliLiter(s) IV Continuous <Continuous>  piperacillin/tazobactam IVPB. 3.375Gram(s) IV Intermittent every 8 hours    MEDICATIONS  (PRN):  acetaminophen   Tablet 650milliGRAM(s) Oral every 6 hours PRN For Temp greater than 38 C (100.4 F)  morphine  - Injectable 4milliGRAM(s) IV Push every 4 hours PRN Severe Pain (7 - 10)  morphine  - Injectable 2milliGRAM(s) IV Push every 6 hours PRN Severe Pain (7 - 10)  oxyCODONE  5 mG/acetaminophen 325 mG 2Tablet(s) Oral every 6 hours PRN Moderate Pain (4 - 6)      PHYSICAL EXAM:    GENERAL: NAD  HEAD:  Atraumatic, Normocephalic  EYES: EOMI, PERRLA, conjunctiva and sclera clear  CHEST/LUNG: Clear to ausculation, bilaterally   HEART: S1S2  ABDOMEN:  woundvac, clean and intact draining sanguinous fluid with minimal output -- no recorded output on chart.  .non distende +BS, soft, non tender, no guarding.  Surrounding skin was nonerythematous and nontender. Residual swellling 2/2 to hematoma slightly improved   EXTREMITIES:  calf soft, non tender     I&O's Detail    I & Os for current day (as of 2017 11:42)  =============================================  IN:    Oral Fluid: 320 ml    Total IN: 320 ml  ---------------------------------------------  OUT:    Voided: 400 ml    Total OUT: 400 ml  ---------------------------------------------  Total NET: -80 ml      LABS:    Urinalysis Basic - ( 2017 20:11 )    Color: Yellow / Appearance: Clear / S.005 / pH: x  Gluc: x / Ketone: Negative  / Bili: Negative / Urobili: Negative mg/dL   Blood: x / Protein: 100 mg/dL / Nitrite: Negative   Leuk Esterase: Negative / RBC: 3-5 /HPF / WBC 3-5   Sq Epi: x / Non Sq Epi: Negative / Bacteria: Few      Culture Results:   No growth to date. ( @ 00:21)  Culture Results:   No growth to date. ( @ 00:20)      Impression: 57 y/o male with PMHx of HTN, asthma, ETOH abuse, c/o   abdominal wall abscess s/p incision and drainage on 17 of infected hematoma with return to OR for hemostasis same day now s/p wound exploration and  debridement and wound vac placement POD#2      Plan:  continue wound vac.  monitor output.    -antibiotics per ID  pain managment   follow up labs  prophylactic measure:   DVT prophylaxis, OOB, Ambulating, incentive spirometer  will take down wound vac on Monday.  - wound care PT eval --   will discus pt. with surgical attending    DONALDO Edge

## 2017-01-29 DIAGNOSIS — N17.9 ACUTE KIDNEY FAILURE, UNSPECIFIED: ICD-10-CM

## 2017-01-29 DIAGNOSIS — R79.89 OTHER SPECIFIED ABNORMAL FINDINGS OF BLOOD CHEMISTRY: ICD-10-CM

## 2017-01-29 LAB
ALBUMIN SERPL ELPH-MCNC: 1.9 G/DL — LOW (ref 3.3–5)
ALBUMIN SERPL ELPH-MCNC: 2 G/DL — LOW (ref 3.3–5)
ALP SERPL-CCNC: 53 U/L — SIGNIFICANT CHANGE UP (ref 40–120)
ALP SERPL-CCNC: 53 U/L — SIGNIFICANT CHANGE UP (ref 40–120)
ALT FLD-CCNC: <6 U/L — LOW (ref 12–78)
ALT FLD-CCNC: <6 U/L — LOW (ref 12–78)
ANION GAP SERPL CALC-SCNC: 11 MMOL/L — SIGNIFICANT CHANGE UP (ref 5–17)
ANION GAP SERPL CALC-SCNC: 13 MMOL/L — SIGNIFICANT CHANGE UP (ref 5–17)
APPEARANCE UR: CLEAR — SIGNIFICANT CHANGE UP
AST SERPL-CCNC: 18 U/L — SIGNIFICANT CHANGE UP (ref 15–37)
AST SERPL-CCNC: 18 U/L — SIGNIFICANT CHANGE UP (ref 15–37)
BACTERIA # UR AUTO: ABNORMAL
BASOPHILS # BLD AUTO: 0.1 K/UL — SIGNIFICANT CHANGE UP (ref 0–0.2)
BASOPHILS NFR BLD AUTO: 1.1 % — SIGNIFICANT CHANGE UP (ref 0–2)
BILIRUB SERPL-MCNC: 0.3 MG/DL — SIGNIFICANT CHANGE UP (ref 0.2–1.2)
BILIRUB SERPL-MCNC: 0.4 MG/DL — SIGNIFICANT CHANGE UP (ref 0.2–1.2)
BILIRUB UR-MCNC: NEGATIVE — SIGNIFICANT CHANGE UP
BUN SERPL-MCNC: 41 MG/DL — HIGH (ref 7–23)
BUN SERPL-MCNC: 43 MG/DL — HIGH (ref 7–23)
CALCIUM SERPL-MCNC: 7.9 MG/DL — LOW (ref 8.5–10.1)
CALCIUM SERPL-MCNC: 8 MG/DL — LOW (ref 8.5–10.1)
CHLORIDE SERPL-SCNC: 100 MMOL/L — SIGNIFICANT CHANGE UP (ref 96–108)
CHLORIDE SERPL-SCNC: 98 MMOL/L — SIGNIFICANT CHANGE UP (ref 96–108)
CO2 SERPL-SCNC: 26 MMOL/L — SIGNIFICANT CHANGE UP (ref 22–31)
CO2 SERPL-SCNC: 29 MMOL/L — SIGNIFICANT CHANGE UP (ref 22–31)
COLOR SPEC: YELLOW — SIGNIFICANT CHANGE UP
COMMENT - URINE: SIGNIFICANT CHANGE UP
CREAT ?TM UR-MCNC: 102 MG/DL — SIGNIFICANT CHANGE UP
CREAT SERPL-MCNC: 7 MG/DL — HIGH (ref 0.5–1.3)
CREAT SERPL-MCNC: 7.17 MG/DL — HIGH (ref 0.5–1.3)
DIFF PNL FLD: NEGATIVE — SIGNIFICANT CHANGE UP
EOSINOPHIL # BLD AUTO: 0.3 K/UL — SIGNIFICANT CHANGE UP (ref 0–0.5)
EOSINOPHIL NFR BLD AUTO: 4.2 % — SIGNIFICANT CHANGE UP (ref 0–6)
EOSINOPHIL NFR URNS MANUAL: NEGATIVE — SIGNIFICANT CHANGE UP
EPI CELLS # UR: SIGNIFICANT CHANGE UP
GLUCOSE SERPL-MCNC: 101 MG/DL — HIGH (ref 70–99)
GLUCOSE SERPL-MCNC: 104 MG/DL — HIGH (ref 70–99)
GLUCOSE UR QL: NEGATIVE MG/DL — SIGNIFICANT CHANGE UP
HCT VFR BLD CALC: 30.9 % — LOW (ref 39–50)
HGB BLD-MCNC: 11.4 G/DL — LOW (ref 13–17)
KETONES UR-MCNC: NEGATIVE — SIGNIFICANT CHANGE UP
LEUKOCYTE ESTERASE UR-ACNC: NEGATIVE — SIGNIFICANT CHANGE UP
LYMPHOCYTES # BLD AUTO: 1.1 K/UL — SIGNIFICANT CHANGE UP (ref 1–3.3)
LYMPHOCYTES # BLD AUTO: 14.5 % — SIGNIFICANT CHANGE UP (ref 13–44)
MCHC RBC-ENTMCNC: 34.1 PG — HIGH (ref 27–34)
MCHC RBC-ENTMCNC: 36.9 GM/DL — HIGH (ref 32–36)
MCV RBC AUTO: 92.4 FL — SIGNIFICANT CHANGE UP (ref 80–100)
MONOCYTES # BLD AUTO: 1 K/UL — HIGH (ref 0–0.9)
MONOCYTES NFR BLD AUTO: 13.2 % — SIGNIFICANT CHANGE UP (ref 2–14)
NEUTROPHILS # BLD AUTO: 5 K/UL — SIGNIFICANT CHANGE UP (ref 1.8–7.4)
NEUTROPHILS NFR BLD AUTO: 67 % — SIGNIFICANT CHANGE UP (ref 43–77)
NITRITE UR-MCNC: NEGATIVE — SIGNIFICANT CHANGE UP
PH UR: 5 — SIGNIFICANT CHANGE UP (ref 4.8–8)
PLATELET # BLD AUTO: 260 K/UL — SIGNIFICANT CHANGE UP (ref 150–400)
POTASSIUM SERPL-MCNC: 3.9 MMOL/L — SIGNIFICANT CHANGE UP (ref 3.5–5.3)
POTASSIUM SERPL-MCNC: 4 MMOL/L — SIGNIFICANT CHANGE UP (ref 3.5–5.3)
POTASSIUM SERPL-SCNC: 3.9 MMOL/L — SIGNIFICANT CHANGE UP (ref 3.5–5.3)
POTASSIUM SERPL-SCNC: 4 MMOL/L — SIGNIFICANT CHANGE UP (ref 3.5–5.3)
PROCALCITONIN SERPL-MCNC: 0.83 NG/ML — HIGH (ref 0–0.05)
PROT ?TM UR-MCNC: 26 MG/DL — HIGH (ref 0–12)
PROT SERPL-MCNC: 6 GM/DL — SIGNIFICANT CHANGE UP (ref 6–8.3)
PROT SERPL-MCNC: 6.1 GM/DL — SIGNIFICANT CHANGE UP (ref 6–8.3)
PROT UR-MCNC: 30 MG/DL
PROT/CREAT UR-RTO: 0.3 RATIO — HIGH (ref 0–0.2)
RBC # BLD: 3.34 M/UL — LOW (ref 4.2–5.8)
RBC # FLD: 12.2 % — SIGNIFICANT CHANGE UP (ref 11–15)
RBC CASTS # UR COMP ASSIST: SIGNIFICANT CHANGE UP /HPF (ref 0–4)
SODIUM SERPL-SCNC: 138 MMOL/L — SIGNIFICANT CHANGE UP (ref 135–145)
SODIUM SERPL-SCNC: 139 MMOL/L — SIGNIFICANT CHANGE UP (ref 135–145)
SODIUM UR-SCNC: 42 MMOL/L — SIGNIFICANT CHANGE UP
SP GR SPEC: 1.01 — SIGNIFICANT CHANGE UP (ref 1.01–1.02)
UROBILINOGEN FLD QL: NEGATIVE MG/DL — SIGNIFICANT CHANGE UP
VANCOMYCIN TROUGH SERPL-MCNC: 22 UG/ML — HIGH (ref 10–20)
WBC # BLD: 7.5 K/UL — SIGNIFICANT CHANGE UP (ref 3.8–10.5)
WBC # FLD AUTO: 7.5 K/UL — SIGNIFICANT CHANGE UP (ref 3.8–10.5)
WBC UR QL: SIGNIFICANT CHANGE UP

## 2017-01-29 PROCEDURE — 99232 SBSQ HOSP IP/OBS MODERATE 35: CPT

## 2017-01-29 PROCEDURE — 99233 SBSQ HOSP IP/OBS HIGH 50: CPT

## 2017-01-29 RX ORDER — ONDANSETRON 8 MG/1
4 TABLET, FILM COATED ORAL EVERY 6 HOURS
Qty: 0 | Refills: 0 | Status: DISCONTINUED | OUTPATIENT
Start: 2017-01-29 | End: 2017-02-04

## 2017-01-29 RX ORDER — CEFTRIAXONE 500 MG/1
1 INJECTION, POWDER, FOR SOLUTION INTRAMUSCULAR; INTRAVENOUS EVERY 24 HOURS
Qty: 0 | Refills: 0 | Status: DISCONTINUED | OUTPATIENT
Start: 2017-01-29 | End: 2017-02-04

## 2017-01-29 RX ORDER — ONDANSETRON 8 MG/1
TABLET, FILM COATED ORAL
Qty: 0 | Refills: 0 | Status: DISCONTINUED | OUTPATIENT
Start: 2017-01-29 | End: 2017-02-04

## 2017-01-29 RX ORDER — SODIUM CHLORIDE 9 MG/ML
500 INJECTION INTRAMUSCULAR; INTRAVENOUS; SUBCUTANEOUS ONCE
Qty: 0 | Refills: 0 | Status: COMPLETED | OUTPATIENT
Start: 2017-01-29 | End: 2017-01-29

## 2017-01-29 RX ORDER — ONDANSETRON 8 MG/1
4 TABLET, FILM COATED ORAL ONCE
Qty: 0 | Refills: 0 | Status: COMPLETED | OUTPATIENT
Start: 2017-01-29 | End: 2017-01-29

## 2017-01-29 RX ADMIN — Medication 100 MILLIGRAM(S): at 06:00

## 2017-01-29 RX ADMIN — Medication 100 MILLIGRAM(S): at 13:00

## 2017-01-29 RX ADMIN — Medication 100 MILLIGRAM(S): at 05:57

## 2017-01-29 RX ADMIN — ONDANSETRON 4 MILLIGRAM(S): 8 TABLET, FILM COATED ORAL at 17:06

## 2017-01-29 RX ADMIN — SENNA PLUS 2 TABLET(S): 8.6 TABLET ORAL at 22:05

## 2017-01-29 RX ADMIN — ONDANSETRON 4 MILLIGRAM(S): 8 TABLET, FILM COATED ORAL at 12:29

## 2017-01-29 RX ADMIN — CEFTRIAXONE 100 GRAM(S): 500 INJECTION, POWDER, FOR SOLUTION INTRAMUSCULAR; INTRAVENOUS at 17:12

## 2017-01-29 RX ADMIN — Medication 50 MILLIGRAM(S): at 06:01

## 2017-01-29 RX ADMIN — Medication 5 MILLIGRAM(S): at 22:05

## 2017-01-29 RX ADMIN — HEPARIN SODIUM 5000 UNIT(S): 5000 INJECTION INTRAVENOUS; SUBCUTANEOUS at 17:06

## 2017-01-29 RX ADMIN — Medication 50 MILLIGRAM(S): at 17:06

## 2017-01-29 RX ADMIN — Medication 100 MILLIGRAM(S): at 22:04

## 2017-01-29 RX ADMIN — HEPARIN SODIUM 5000 UNIT(S): 5000 INJECTION INTRAVENOUS; SUBCUTANEOUS at 06:01

## 2017-01-29 RX ADMIN — SODIUM CHLORIDE 1000 MILLILITER(S): 9 INJECTION INTRAMUSCULAR; INTRAVENOUS; SUBCUTANEOUS at 08:50

## 2017-01-29 RX ADMIN — ONDANSETRON 4 MILLIGRAM(S): 8 TABLET, FILM COATED ORAL at 23:45

## 2017-01-29 RX ADMIN — PIPERACILLIN AND TAZOBACTAM 25 GRAM(S): 4; .5 INJECTION, POWDER, LYOPHILIZED, FOR SOLUTION INTRAVENOUS at 05:59

## 2017-01-29 RX ADMIN — MORPHINE SULFATE 4 MILLIGRAM(S): 50 CAPSULE, EXTENDED RELEASE ORAL at 02:05

## 2017-01-29 RX ADMIN — MORPHINE SULFATE 4 MILLIGRAM(S): 50 CAPSULE, EXTENDED RELEASE ORAL at 01:35

## 2017-01-29 RX ADMIN — PIPERACILLIN AND TAZOBACTAM 25 GRAM(S): 4; .5 INJECTION, POWDER, LYOPHILIZED, FOR SOLUTION INTRAVENOUS at 13:00
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
## 2017-01-29 NOTE — PROGRESS NOTE ADULT - SUBJECTIVE AND OBJECTIVE BOX
Patient examined at bedside, resting comfortably  Abdominal pain well controlled  Denies N/V, +flatus, no BM  Denies chest pain, SOB, fever/chills    Vital Signs Last 24 Hrs  T(F): 98.8, Max: 99 (01-28 @ 09:06)  HR: 75  BP: 131/87  RR: 15  SpO2: 98%    GENERAL: Alert, oriented, NAD  CHEST/LUNG: Clear to auscultation bilaterally, respirations nonlabored  HEART: S1S2, Regular rate and rhythm  ABDOMEN: + Bowel sounds, soft, mild distension. Mild tenderness at incision site. Wound vac intact with minimal serosang drainage. Improvement in hematoma swelling. No erythema  EXTREMITIES: no calf tenderness, no LE edema      LABS:                        11.4   7.5   )-----------( 260      ( 29 Jan 2017 07:09 )             30.9     29 Jan 2017 07:09    139    |  100    |  41     ----------------------------<  101    3.9     |  26     |  7.17     Ca    8.0        29 Jan 2017 07:09  Phos  4.3       28 Jan 2017 12:42  Mg     2.2       28 Jan 2017 12:42    TPro  6.0    /  Alb  1.9    /  TBili  0.4    /  DBili  x      /  AST  18     /  ALT  <6     /  AlkPhos  53     29 Jan 2017 07:09    PT/INR - ( 28 Jan 2017 12:42 )   PT: 13.2 sec;   INR: 1.18 ratio             Impression: 57 y/o male with PMHx of HTN, asthma, ETOH abuse, c/o   abdominal wall abscess s/p incision and drainage on 1/23/17 of infected hematoma with return to OR for hemostasis same day now s/p wound exploration and  debridement and wound vac placement POD#6, now with KANE      Plan:  -NS bolus, f/u repeat BMP, ?nephro consult  -continue wound vac. monitor output.    -continue antibiotics   -continue pain management   -follow up labs  -DVT prophylaxis: heparin  -OOB, Ambulating, incentive spirometry   -will discuss with surgical attending    DONALDO Boland Patient examined at bedside, resting comfortably  Abdominal pain well controlled  Denies N/V, +flatus, no BM  Denies chest pain, SOB, fever/chills    Vital Signs Last 24 Hrs  T(F): 98.8, Max: 99 (01-28 @ 09:06)  HR: 75  BP: 131/87  RR: 15  SpO2: 98%    GENERAL: Alert, oriented, NAD  CHEST/LUNG: Clear to auscultation bilaterally, respirations nonlabored  HEART: S1S2, Regular rate and rhythm  ABDOMEN: + Bowel sounds, soft, mild distension. Mild tenderness at incision site. Wound vac intact with minimal serosang drainage. Improvement in hematoma swelling. No erythema  EXTREMITIES: no calf tenderness, no LE edema      LABS:                        11.4   7.5   )-----------( 260      ( 29 Jan 2017 07:09 )             30.9     29 Jan 2017 07:09    139    |  100    |  41     ----------------------------<  101    3.9     |  26     |  7.17     Ca    8.0        29 Jan 2017 07:09  Phos  4.3       28 Jan 2017 12:42  Mg     2.2       28 Jan 2017 12:42    TPro  6.0    /  Alb  1.9    /  TBili  0.4    /  DBili  x      /  AST  18     /  ALT  <6     /  AlkPhos  53     29 Jan 2017 07:09    PT/INR - ( 28 Jan 2017 12:42 )   PT: 13.2 sec;   INR: 1.18 ratio             Impression: 55 y/o male with PMHx of HTN, asthma, ETOH abuse, c/o   abdominal wall abscess s/p incision and drainage on 1/23/17 of infected hematoma with return to OR for hemostasis same day now s/p wound exploration and  debridement and wound vac placement POD#6, now with KANE      Plan:  -NS bolus, f/u repeat BMP, ?nephro consult  -continue wound vac. monitor output.    -continue antibiotics   -continue pain management   -follow up labs  - DVT ppx   - OOB to ambulate as tolerated  -will discuss with surgical attending    DONALDO Boland

## 2017-01-29 NOTE — PROGRESS NOTE ADULT - SUBJECTIVE AND OBJECTIVE BOX
Patient is a 56y old  Male who presents with a chief complaint of abdominal pain x 1 week (2017 14:02)      INTERVAL HPI / OVERNIGHT EVENTS: fever resolved    MEDICATIONS  (STANDING):  influenza   Vaccine 0.5milliLiter(s) IntraMuscular once  chlorhexidine 4% Liquid 1Application(s) Topical daily  metoprolol 50milliGRAM(s) Oral two times a day  senna 2Tablet(s) Oral at bedtime  docusate sodium 100milliGRAM(s) Oral three times a day  sodium chloride 0.9%. 1000milliLiter(s) IV Continuous <Continuous>  bisacodyl 5milliGRAM(s) Oral at bedtime  morphine  - Injectable 4milliGRAM(s) IV Push once  heparin  Injectable 5000Unit(s) SubCutaneous every 12 hours  ondansetron Injectable     ondansetron Injectable 4milliGRAM(s) IV Push every 6 hours  cefTRIAXone   IVPB 1Gram(s) IV Intermittent every 24 hours    MEDICATIONS  (PRN):  acetaminophen   Tablet 650milliGRAM(s) Oral every 6 hours PRN For Temp greater than 38 C (100.4 F)  morphine  - Injectable 4milliGRAM(s) IV Push every 4 hours PRN Severe Pain (7 - 10)  morphine  - Injectable 2milliGRAM(s) IV Push every 6 hours PRN Severe Pain (7 - 10)  oxyCODONE  5 mG/acetaminophen 325 mG 2Tablet(s) Oral every 6 hours PRN Moderate Pain (4 - 6)      Vital Signs Last 24 Hrs  T(C): 36.7, Max: 37.1 ( @ 05:54)  T(F): 98, Max: 98.8 ( @ 05:54)  HR: 77 (75 - 77)  BP: 143/87 (122/81 - 143/87)  BP(mean): --  RR: 14 (14 - 15)  SpO2: 9% (9% - 98%)    PHYSICAL EXAM:    Constitutional: NAD, well-groomed, well-developed  Respiratory: CTAB/L  Cardiovascular: S1 and S2, RRR, no M/G/R  Gastrointestinal/skin-open wound with wound vac placed  Extremities: No peripheral edema  Vascular: 2+ peripheral pulses        LABS:                        11.4   7.5   )-----------( 260      ( 2017 07:09 )             30.9     2017 15:03    138    |  98     |  43     ----------------------------<  104    4.0     |  29     |  7.00     Ca    7.9        2017 15:03  Phos  4.3       2017 12:42  Mg     2.2       2017 12:42    TPro  6.1    /  Alb  2.0    /  TBili  0.3    /  DBili  x      /  AST  18     /  ALT  <6     /  AlkPhos  53     2017 15:03    PT/INR - ( 2017 12:42 )   PT: 13.2 sec;   INR: 1.18 ratio           Urinalysis Basic - ( 2017 14:33 )    Color: Yellow / Appearance: Clear / S.010 / pH: x  Gluc: x / Ketone: Negative  / Bili: Negative / Urobili: Negative mg/dL   Blood: x / Protein: 30 mg/dL / Nitrite: Negative   Leuk Esterase: Negative / RBC: 0-2 /HPF / WBC 0-2   Sq Epi: x / Non Sq Epi: Occasional / Bacteria: Few          MICROBIOLOGY:  RECENT CULTURES:   .Blood Blood-Peripheral XXXX XXXX   No growth to date.     .Blood Blood-Peripheral XXXX XXXX   No growth to date.     .Surgical Swab ABDOMINAL WALL ABSCESS Staphylococcus aureus XXXX   Numerous Staphylococcus aureus          RADIOLOGY & ADDITIONAL STUDIES:

## 2017-01-29 NOTE — PROGRESS NOTE ADULT - SUBJECTIVE AND OBJECTIVE BOX
Patient is a 56y old  Male who presents with a chief complaint of abdominal pain x 1 week (22 Jan 2017 14:02)      INTERVAL HPI/OVERNIGHT EVENTS:    no complains . pt is post operative status .      MEDICATIONS  (STANDING):  influenza   Vaccine 0.5milliLiter(s) IntraMuscular once  chlorhexidine 4% Liquid 1Application(s) Topical daily  metoprolol 50milliGRAM(s) Oral two times a day  senna 2Tablet(s) Oral at bedtime  docusate sodium 100milliGRAM(s) Oral three times a day  sodium chloride 0.9%. 1000milliLiter(s) IV Continuous <Continuous>  bisacodyl 5milliGRAM(s) Oral at bedtime  ceFAZolin   IVPB 2000milliGRAM(s) IV Intermittent every 8 hours  morphine  - Injectable 4milliGRAM(s) IV Push once  heparin  Injectable 5000Unit(s) SubCutaneous every 12 hours  piperacillin/tazobactam IVPB. 3.375Gram(s) IV Intermittent every 8 hours  ondansetron Injectable       MEDICATIONS  (PRN):  acetaminophen   Tablet 650milliGRAM(s) Oral every 6 hours PRN For Temp greater than 38 C (100.4 F)  morphine  - Injectable 4milliGRAM(s) IV Push every 4 hours PRN Severe Pain (7 - 10)  morphine  - Injectable 2milliGRAM(s) IV Push every 6 hours PRN Severe Pain (7 - 10)  oxyCODONE  5 mG/acetaminophen 325 mG 2Tablet(s) Oral every 6 hours PRN Moderate Pain (4 - 6)      Allergies    No Known Allergies    Intolerances        REVIEW OF SYSTEMS:  CONSTITUTIONAL: No fever, weight loss, or fatigue  EYES: No eye pain, visual disturbances, or discharge  ENMT:  No difficulty hearing, tinnitus, vertigo; No sinus or throat pain  NECK: No pain or stiffness  BREASTS: No pain, masses, or nipple discharge  RESPIRATORY: No cough, wheezing, chills or hemoptysis; No shortness of breath  CARDIOVASCULAR: No chest pain, palpitations, dizziness, or leg swelling  GASTROINTESTINAL: No abdominal or epigastric pain. No nausea, vomiting, or hematemesis; No diarrhea or constipation. No melena or hematochezia.  GENITOURINARY: No dysuria, frequency, hematuria, or incontinence  NEUROLOGICAL: No headaches, memory loss, loss of strength, numbness, or tremors  SKIN: No itching, burning, rashes, or lesions   LYMPH NODES: No enlarged glands  ENDOCRINE: No heat or cold intolerance; No hair loss  MUSCULOSKELETAL: No joint pain or swelling; No muscle, back, or extremity pain  PSYCHIATRIC: No depression, anxiety, mood swings, or difficulty sleeping  HEME/LYMPH: No easy bruising, or bleeding gums  ALLERGY AND IMMUNOLOGIC: No hives or eczema    Vital Signs Last 24 Hrs  T(C): 37.1, Max: 37.1 (01-28 @ 17:17)  T(F): 98.8, Max: 98.8 (01-28 @ 17:17)  HR: 75 (75 - 81)  BP: 131/87 (109/81 - 131/87)  BP(mean): --  RR: 15 (15 - 15)  SpO2: 98% (98% - 100%)    PHYSICAL EXAM:  GENERAL: NAD, well-groomed, well-developed  HEAD:  Atraumatic, Normocephalic  EYES: EOMI, PERRLA, conjunctiva and sclera clear  ENMT: No tonsillar erythema, exudates, or enlargement; Moist mucous membranes, Good dentition, No lesions  NECK: Supple, No JVD, Normal thyroid  NERVOUS SYSTEM:  Alert & Oriented X3, Good concentration; Motor Strength 5/5 B/L upper and lower extremities; DTRs 2+ intact and symmetric  CHEST/LUNG: Clear to percussion bilaterally; No rales, rhonchi, wheezing, or rubs  HEART: Regular rate and rhythm; No murmurs, rubs, or gallops  ABDOMEN: Soft, Nontender, Nondistended; Bowel sounds present  EXTREMITIES:  2+ Peripheral Pulses, No clubbing, cyanosis, or edema  LYMPH: No lymphadenopathy noted  SKIN: No rashes or lesions    LABS:                        11.4   7.5   )-----------( 260      ( 29 Jan 2017 07:09 )             30.9     29 Jan 2017 07:09    139    |  100    |  41     ----------------------------<  101    3.9     |  26     |  7.17     Ca    8.0        29 Jan 2017 07:09  Phos  4.3       28 Jan 2017 12:42  Mg     2.2       28 Jan 2017 12:42    TPro  6.0    /  Alb  1.9    /  TBili  0.4    /  DBili  x      /  AST  18     /  ALT  <6     /  AlkPhos  53     29 Jan 2017 07:09    PT/INR - ( 28 Jan 2017 12:42 )   PT: 13.2 sec;   INR: 1.18 ratio             CAPILLARY BLOOD GLUCOSE      RADIOLOGY & ADDITIONAL TESTS:    Imaging Personally Reviewed:  [ ] YES  [ ] NO    Consultant(s) Notes Reviewed:  [ ] YES  [ ] NO    Care Discussed with Consultants/Other Providers [ ] YES  [ ] NO

## 2017-01-30 DIAGNOSIS — N17.0 ACUTE KIDNEY FAILURE WITH TUBULAR NECROSIS: ICD-10-CM

## 2017-01-30 LAB
ALBUMIN SERPL ELPH-MCNC: 2 G/DL — LOW (ref 3.3–5)
ALP SERPL-CCNC: 54 U/L — SIGNIFICANT CHANGE UP (ref 40–120)
ALT FLD-CCNC: <6 U/L — LOW (ref 12–78)
ANION GAP SERPL CALC-SCNC: 11 MMOL/L — SIGNIFICANT CHANGE UP (ref 5–17)
AST SERPL-CCNC: 23 U/L — SIGNIFICANT CHANGE UP (ref 15–37)
BILIRUB SERPL-MCNC: 0.4 MG/DL — SIGNIFICANT CHANGE UP (ref 0.2–1.2)
BUN SERPL-MCNC: 42 MG/DL — HIGH (ref 7–23)
C3 SERPL-MCNC: 112 MG/DL — SIGNIFICANT CHANGE UP (ref 80–180)
C4 SERPL-MCNC: 20 MG/DL — SIGNIFICANT CHANGE UP (ref 10–45)
CALCIUM SERPL-MCNC: 8.2 MG/DL — LOW (ref 8.5–10.1)
CHLORIDE SERPL-SCNC: 103 MMOL/L — SIGNIFICANT CHANGE UP (ref 96–108)
CO2 SERPL-SCNC: 24 MMOL/L — SIGNIFICANT CHANGE UP (ref 22–31)
CREAT SERPL-MCNC: 6.72 MG/DL — HIGH (ref 0.5–1.3)
GLUCOSE SERPL-MCNC: 88 MG/DL — SIGNIFICANT CHANGE UP (ref 70–99)
HCT VFR BLD CALC: 30.4 % — LOW (ref 39–50)
HGB BLD-MCNC: 10.8 G/DL — LOW (ref 13–17)
MCHC RBC-ENTMCNC: 34.2 PG — HIGH (ref 27–34)
MCHC RBC-ENTMCNC: 35.6 GM/DL — SIGNIFICANT CHANGE UP (ref 32–36)
MCV RBC AUTO: 96.2 FL — SIGNIFICANT CHANGE UP (ref 80–100)
PLATELET # BLD AUTO: 280 K/UL — SIGNIFICANT CHANGE UP (ref 150–400)
POTASSIUM SERPL-MCNC: 4.5 MMOL/L — SIGNIFICANT CHANGE UP (ref 3.5–5.3)
POTASSIUM SERPL-SCNC: 4.5 MMOL/L — SIGNIFICANT CHANGE UP (ref 3.5–5.3)
PROT SERPL-MCNC: 6.3 GM/DL — SIGNIFICANT CHANGE UP (ref 6–8.3)
RBC # BLD: 3.16 M/UL — LOW (ref 4.2–5.8)
RBC # FLD: 12.7 % — SIGNIFICANT CHANGE UP (ref 11–15)
SODIUM SERPL-SCNC: 138 MMOL/L — SIGNIFICANT CHANGE UP (ref 135–145)
WBC # BLD: 8.7 K/UL — SIGNIFICANT CHANGE UP (ref 3.8–10.5)
WBC # FLD AUTO: 8.7 K/UL — SIGNIFICANT CHANGE UP (ref 3.8–10.5)

## 2017-01-30 PROCEDURE — 99232 SBSQ HOSP IP/OBS MODERATE 35: CPT

## 2017-01-30 PROCEDURE — 99233 SBSQ HOSP IP/OBS HIGH 50: CPT

## 2017-01-30 PROCEDURE — 76705 ECHO EXAM OF ABDOMEN: CPT | Mod: 26

## 2017-01-30 RX ADMIN — Medication 50 MILLIGRAM(S): at 05:52

## 2017-01-30 RX ADMIN — ONDANSETRON 4 MILLIGRAM(S): 8 TABLET, FILM COATED ORAL at 23:44

## 2017-01-30 RX ADMIN — Medication 100 MILLIGRAM(S): at 22:26

## 2017-01-30 RX ADMIN — SENNA PLUS 2 TABLET(S): 8.6 TABLET ORAL at 22:26

## 2017-01-30 RX ADMIN — SODIUM CHLORIDE 75 MILLILITER(S): 9 INJECTION INTRAMUSCULAR; INTRAVENOUS; SUBCUTANEOUS at 18:24

## 2017-01-30 RX ADMIN — Medication 5 MILLIGRAM(S): at 22:26

## 2017-01-30 RX ADMIN — CEFTRIAXONE 100 GRAM(S): 500 INJECTION, POWDER, FOR SOLUTION INTRAMUSCULAR; INTRAVENOUS at 18:27

## 2017-01-30 RX ADMIN — ONDANSETRON 4 MILLIGRAM(S): 8 TABLET, FILM COATED ORAL at 12:06

## 2017-01-30 RX ADMIN — Medication 100 MILLIGRAM(S): at 05:52

## 2017-01-30 RX ADMIN — HEPARIN SODIUM 5000 UNIT(S): 5000 INJECTION INTRAVENOUS; SUBCUTANEOUS at 18:28

## 2017-01-30 RX ADMIN — Medication 50 MILLIGRAM(S): at 18:27

## 2017-01-30 RX ADMIN — ONDANSETRON 4 MILLIGRAM(S): 8 TABLET, FILM COATED ORAL at 18:27

## 2017-01-30 RX ADMIN — ONDANSETRON 4 MILLIGRAM(S): 8 TABLET, FILM COATED ORAL at 05:50

## 2017-01-30 RX ADMIN — Medication 100 MILLIGRAM(S): at 14:48

## 2017-01-30 RX ADMIN — HEPARIN SODIUM 5000 UNIT(S): 5000 INJECTION INTRAVENOUS; SUBCUTANEOUS at 05:53

## 2017-01-30 NOTE — PROGRESS NOTE ADULT - SUBJECTIVE AND OBJECTIVE BOX
Patient is a 56y old  Male who presents with a chief complaint of abdominal pain x 1 week (2017 14:02)      INTERVAL HPI / OVERNIGHT EVENTS:appears better, denies nay c/o    MEDICATIONS  (STANDING):  influenza   Vaccine 0.5milliLiter(s) IntraMuscular once  chlorhexidine 4% Liquid 1Application(s) Topical daily  metoprolol 50milliGRAM(s) Oral two times a day  senna 2Tablet(s) Oral at bedtime  docusate sodium 100milliGRAM(s) Oral three times a day  sodium chloride 0.9%. 1000milliLiter(s) IV Continuous <Continuous>  bisacodyl 5milliGRAM(s) Oral at bedtime  morphine  - Injectable 4milliGRAM(s) IV Push once  heparin  Injectable 5000Unit(s) SubCutaneous every 12 hours  ondansetron Injectable     ondansetron Injectable 4milliGRAM(s) IV Push every 6 hours  cefTRIAXone   IVPB 1Gram(s) IV Intermittent every 24 hours    MEDICATIONS  (PRN):  acetaminophen   Tablet 650milliGRAM(s) Oral every 6 hours PRN For Temp greater than 38 C (100.4 F)  morphine  - Injectable 4milliGRAM(s) IV Push every 4 hours PRN Severe Pain (7 - 10)  morphine  - Injectable 2milliGRAM(s) IV Push every 6 hours PRN Severe Pain (7 - 10)  oxyCODONE  5 mG/acetaminophen 325 mG 2Tablet(s) Oral every 6 hours PRN Moderate Pain (4 - 6)      Vital Signs Last 24 Hrs  T(C): 36.7, Max: 37.2 ( @ 05:41)  T(F): 98, Max: 99 ( @ 05:41)  HR: 83 (75 - 84)  BP: 140/82 (122/81 - 161/93)  BP(mean): --  RR: 14 (14 - 15)  SpO2: 99% (9% - 100%)    PHYSICAL EXAM:    Constitutional: NAD, well-groomed, well-developed  Respiratory: CTAB/L  Cardiovascular: S1 and S2, RRR, no M/G/R  Gastrointestinal/skin: abdominal wound with wound vac+  Extremities: No peripheral edema  Vascular: 2+ peripheral pulses  Skin: No rashes      LABS:                        10.8   8.7   )-----------( 280      ( 2017 06:16 )             30.4     2017 06:16    138    |  103    |  42     ----------------------------<  88     4.5     |  24     |  6.72     Ca    8.2        2017 06:16    TPro  6.3    /  Alb  2.0    /  TBili  0.4    /  DBili  x      /  AST  23     /  ALT  <6     /  AlkPhos  54     2017 06:16      Urinalysis Basic - ( 2017 14:33 )    Color: Yellow / Appearance: Clear / S.010 / pH: x  Gluc: x / Ketone: Negative  / Bili: Negative / Urobili: Negative mg/dL   Blood: x / Protein: 30 mg/dL / Nitrite: Negative   Leuk Esterase: Negative / RBC: 0-2 /HPF / WBC 0-2   Sq Epi: x / Non Sq Epi: Occasional / Bacteria: Few          MICROBIOLOGY:  RECENT CULTURES:   .Blood Blood-Peripheral XXXX XXXX   No growth to date.     .Blood Blood-Peripheral XXXX XXXX   No growth to date.     .Surgical Swab ABDOMINAL WALL ABSCESS Staphylococcus aureus XXXX   Numerous Staphylococcus aureus          RADIOLOGY & ADDITIONAL STUDIES:

## 2017-01-30 NOTE — PHYSICAL THERAPY INITIAL EVALUATION ADULT - BED MOBILITY LIMITATIONS, REHAB EVAL
impaired ability to control trunk for mobility
impaired ability to control trunk for mobility/decreased ability to use legs for bridging/pushing/decreased ability to use arms for pushing/pulling

## 2017-01-30 NOTE — PHYSICAL THERAPY INITIAL EVALUATION ADULT - PERTINENT HX OF CURRENT PROBLEM, REHAB EVAL
Wound care evaluation secondary to 1st wound vac change after initial wound vac application by PA's in the OR on 1/27/17.

## 2017-01-30 NOTE — PROGRESS NOTE ADULT - SUBJECTIVE AND OBJECTIVE BOX
Comfortable, tolerating diet  VSS, afebrile    Abdomen soft    VAC in place  Renal seeing for elevated creatinine  VAC change today  D/C abx

## 2017-01-30 NOTE — PHYSICAL THERAPY INITIAL EVALUATION ADULT - PLANNED THERAPY INTERVENTIONS, PT EVAL
strengthening/postural re-education/balance training/transfer training/bed mobility training/gait training
postural re-education/strengthening/balance training/gait training

## 2017-01-30 NOTE — PHYSICAL THERAPY INITIAL EVALUATION ADULT - MUSCLE TONE ASSESSMENT, REHAB EVAL
bilateral upper extremities/normal/bilateral lower extremities
bilateral upper extremities/bilateral lower extremities/normal

## 2017-01-30 NOTE — PROGRESS NOTE ADULT - SUBJECTIVE AND OBJECTIVE BOX
Patient seen in follow up for KANE; feels well.    MEDICATIONS  (STANDING):  influenza   Vaccine 0.5milliLiter(s) IntraMuscular once  chlorhexidine 4% Liquid 1Application(s) Topical daily  metoprolol 50milliGRAM(s) Oral two times a day  senna 2Tablet(s) Oral at bedtime  docusate sodium 100milliGRAM(s) Oral three times a day  sodium chloride 0.9%. 1000milliLiter(s) IV Continuous <Continuous>  bisacodyl 5milliGRAM(s) Oral at bedtime  morphine  - Injectable 4milliGRAM(s) IV Push once  heparin  Injectable 5000Unit(s) SubCutaneous every 12 hours  ondansetron Injectable     ondansetron Injectable 4milliGRAM(s) IV Push every 6 hours  cefTRIAXone   IVPB 1Gram(s) IV Intermittent every 24 hours    MEDICATIONS  (PRN):  acetaminophen   Tablet 650milliGRAM(s) Oral every 6 hours PRN For Temp greater than 38 C (100.4 F)  morphine  - Injectable 4milliGRAM(s) IV Push every 4 hours PRN Severe Pain (7 - 10)  morphine  - Injectable 2milliGRAM(s) IV Push every 6 hours PRN Severe Pain (7 - 10)  oxyCODONE  5 mG/acetaminophen 325 mG 2Tablet(s) Oral every 6 hours PRN Moderate Pain (4 - 6)    PHYSICAL EXAM:      T(C): 36.7, Max: 37.2 (01-30 @ 05:41)  HR: 83 (77 - 84)  BP: 140/82 (125/77 - 161/93)  RR: 14 (14 - 15)  SpO2: 99% (9% - 100%)  Wt(kg): --  Respiratory: clear anteriorly, decreased BS at bases  Cardiovascular: S1 S2  Gastrointestinal: soft NT ND +BS wound vac in place  Extremities:    tr edema                                    10.8   8.7   )-----------( 280      ( 30 Jan 2017 06:16 )             30.4     30 Jan 2017 06:16    138    |  103    |  42     ----------------------------<  88     4.5     |  24     |  6.72     Ca    8.2        30 Jan 2017 06:16    TPro  6.3    /  Alb  2.0    /  TBili  0.4    /  DBili  x      /  AST  23     /  ALT  <6     /  AlkPhos  54     30 Jan 2017 06:16      LIVER FUNCTIONS - ( 30 Jan 2017 06:16 )  Alb: 2.0 g/dL / Pro: 6.3 gm/dL / ALK PHOS: 54 U/L / ALT: <6 U/L / AST: 23 U/L / GGT: x           Urine eos negative    Assessment and Plan:    KANE; suspected ATN; infectious GN;  improving trend; follow Vanco tr; elevated Jan 29;  last dose Jan 26.  Will follow.

## 2017-01-30 NOTE — PHYSICAL THERAPY INITIAL EVALUATION ADULT - CRITERIA FOR SKILLED THERAPEUTIC INTERVENTIONS
predicted duration of therapy intervention/anticipated discharge recommendation/rehab potential/therapy frequency/functional limitations in following categories/Acute Rehab/risk reduction/prevention/impairments found
predicted duration of therapy intervention/rehab potential/functional limitations in following categories/Home with Outpatient P.T./therapy frequency/anticipated discharge recommendation/impairments found/risk reduction/prevention

## 2017-01-30 NOTE — PROGRESS NOTE ADULT - SUBJECTIVE AND OBJECTIVE BOX
Patient is a 56y old  Male who presents with a chief complaint of abdominal pain x 1 week (2017 14:02)      INTERVAL HPI/OVERNIGHT EVENTS:  no new complaints, +BM, pain in control, wound vac in place    MEDICATIONS  (STANDING):  influenza   Vaccine 0.5milliLiter(s) IntraMuscular once  chlorhexidine 4% Liquid 1Application(s) Topical daily  metoprolol 50milliGRAM(s) Oral two times a day  senna 2Tablet(s) Oral at bedtime  docusate sodium 100milliGRAM(s) Oral three times a day  sodium chloride 0.9%. 1000milliLiter(s) IV Continuous <Continuous>  bisacodyl 5milliGRAM(s) Oral at bedtime  morphine  - Injectable 4milliGRAM(s) IV Push once  heparin  Injectable 5000Unit(s) SubCutaneous every 12 hours  ondansetron Injectable     ondansetron Injectable 4milliGRAM(s) IV Push every 6 hours  cefTRIAXone   IVPB 1Gram(s) IV Intermittent every 24 hours    MEDICATIONS  (PRN):  acetaminophen   Tablet 650milliGRAM(s) Oral every 6 hours PRN For Temp greater than 38 C (100.4 F)  morphine  - Injectable 4milliGRAM(s) IV Push every 4 hours PRN Severe Pain (7 - 10)  morphine  - Injectable 2milliGRAM(s) IV Push every 6 hours PRN Severe Pain (7 - 10)  oxyCODONE  5 mG/acetaminophen 325 mG 2Tablet(s) Oral every 6 hours PRN Moderate Pain (4 - 6)      Allergies    No Known Allergies    Intolerances        REVIEW OF SYSTEMS:  CONSTITUTIONAL: No fever, weight loss, or fatigue  EYES: No eye pain, visual disturbances, or discharge  ENMT:  No difficulty hearing, tinnitus, vertigo; No sinus or throat pain  NECK: No pain or stiffness  BREASTS: No pain, masses, or nipple discharge  RESPIRATORY: No cough, wheezing, chills or hemoptysis; No shortness of breath  CARDIOVASCULAR: No chest pain, palpitations, dizziness, or leg swelling  GASTROINTESTINAL: No abdominal or epigastric pain. No nausea, vomiting, or hematemesis; No diarrhea or constipation. No melena or hematochezia.  GENITOURINARY: No dysuria, frequency, hematuria, or incontinence  NEUROLOGICAL: No headaches, memory loss, loss of strength, numbness, or tremors  SKIN: No itching, burning, rashes, or lesions   LYMPH NODES: No enlarged glands  ENDOCRINE: No heat or cold intolerance; No hair loss  MUSCULOSKELETAL: No joint pain or swelling; No muscle, back, or extremity pain  PSYCHIATRIC: No depression, anxiety, mood swings, or difficulty sleeping  HEME/LYMPH: No easy bruising, or bleeding gums  ALLERGY AND IMMUNOLOGIC: No hives or eczema    Vital Signs Last 24 Hrs  T(C): 36.7, Max: 37.2 ( @ 05:41)  T(F): 98, Max: 99 ( @ 05:41)  HR: 83 (77 - 84)  BP: 140/82 (125/77 - 161/93)  BP(mean): --  RR: 14 (14 - 15)  SpO2: 99% (9% - 100%)    PHYSICAL EXAM:  GENERAL: NAD, well-groomed, well-developed  HEAD:  Atraumatic, Normocephalic  EYES: EOMI, PERRLA, conjunctiva and sclera clear  ENMT: No tonsillar erythema, exudates, or enlargement; Moist mucous membranes, Good dentition, No lesions  NECK: Supple, No JVD, Normal thyroid  NERVOUS SYSTEM:  Alert & Oriented X3, Good concentration; Motor Strength 5/5 B/L upper and lower extremities; DTRs 2+ intact and symmetric  CHEST/LUNG: Clear to percussion bilaterally; No rales, rhonchi, wheezing, or rubs  HEART: Regular rate and rhythm; No murmurs, rubs, or gallops  ABDOMEN: Soft, cressing c/d/i, wound vac in place; Bowel sounds present  EXTREMITIES:  2+ Peripheral Pulses, No clubbing, cyanosis, or edema  LYMPH: No lymphadenopathy noted  SKIN: No rashes or lesions    LABS:                        10.8   8.7   )-----------( 280      ( 2017 06:16 )             30.4     2017 06:16    138    |  103    |  42     ----------------------------<  88     4.5     |  24     |  6.72     Ca    8.2        2017 06:16    TPro  6.3    /  Alb  2.0    /  TBili  0.4    /  DBili  x      /  AST  23     /  ALT  <6     /  AlkPhos  54     2017 06:16      Urinalysis Basic - ( 2017 14:33 )    Color: Yellow / Appearance: Clear / S.010 / pH: x  Gluc: x / Ketone: Negative  / Bili: Negative / Urobili: Negative mg/dL   Blood: x / Protein: 30 mg/dL / Nitrite: Negative   Leuk Esterase: Negative / RBC: 0-2 /HPF / WBC 0-2   Sq Epi: x / Non Sq Epi: Occasional / Bacteria: Few      CAPILLARY BLOOD GLUCOSE      RADIOLOGY & ADDITIONAL TESTS:    Imaging Personally Reviewed:  [ x] YES  [ ] NO    Consultant(s) Notes Reviewed:  [ x] YES  [ ] NO    Care Discussed with Consultants/Other Providers [ x] YES  [ ] NO

## 2017-01-30 NOTE — PHYSICAL THERAPY INITIAL EVALUATION ADULT - MODIFIED CLINICAL TEST OF SENSORY INTEGRATION IN BALANCE TEST
Barthel Index: Feeding Score _0__, Bathing Score _0__, Grooming Score _0__, Dressing Score _0__, Bowels Score _10__, Bladder Score _0__, Toilet Score _0__, Transfers Score _0__, Mobility Score _0__, Stairs Score _0__,     Total Score _10__
Barthel Index: Feeding Score _10__, Bathing Score _5__, Grooming Score _5__, Dressing Score _10__, Bowels Score _10__, Bladder Score _10__, Toilet Score _10__, Transfers Score _10__, Mobility Score _15__, Stairs Score __10_,     Total Score _95__

## 2017-01-31 LAB
ANION GAP SERPL CALC-SCNC: 5 MMOL/L — SIGNIFICANT CHANGE UP (ref 5–17)
BUN SERPL-MCNC: 42 MG/DL — HIGH (ref 7–23)
CALCIUM SERPL-MCNC: 8.2 MG/DL — LOW (ref 8.5–10.1)
CHLORIDE SERPL-SCNC: 103 MMOL/L — SIGNIFICANT CHANGE UP (ref 96–108)
CO2 SERPL-SCNC: 31 MMOL/L — SIGNIFICANT CHANGE UP (ref 22–31)
CREAT SERPL-MCNC: 5.8 MG/DL — HIGH (ref 0.5–1.3)
GLUCOSE SERPL-MCNC: 130 MG/DL — HIGH (ref 70–99)
POTASSIUM SERPL-MCNC: 4 MMOL/L — SIGNIFICANT CHANGE UP (ref 3.5–5.3)
POTASSIUM SERPL-SCNC: 4 MMOL/L — SIGNIFICANT CHANGE UP (ref 3.5–5.3)
SODIUM SERPL-SCNC: 139 MMOL/L — SIGNIFICANT CHANGE UP (ref 135–145)
SURGICAL PATHOLOGY FINAL REPORT - CH: SIGNIFICANT CHANGE UP

## 2017-01-31 PROCEDURE — 99232 SBSQ HOSP IP/OBS MODERATE 35: CPT

## 2017-01-31 PROCEDURE — 99233 SBSQ HOSP IP/OBS HIGH 50: CPT

## 2017-01-31 RX ORDER — ALBUTEROL 90 UG/1
2 AEROSOL, METERED ORAL EVERY 6 HOURS
Qty: 0 | Refills: 0 | Status: DISCONTINUED | OUTPATIENT
Start: 2017-01-31 | End: 2017-02-04

## 2017-01-31 RX ADMIN — Medication 50 MILLIGRAM(S): at 17:06

## 2017-01-31 RX ADMIN — CEFTRIAXONE 100 GRAM(S): 500 INJECTION, POWDER, FOR SOLUTION INTRAMUSCULAR; INTRAVENOUS at 17:18

## 2017-01-31 RX ADMIN — ONDANSETRON 4 MILLIGRAM(S): 8 TABLET, FILM COATED ORAL at 05:32

## 2017-01-31 RX ADMIN — ONDANSETRON 4 MILLIGRAM(S): 8 TABLET, FILM COATED ORAL at 17:05

## 2017-01-31 RX ADMIN — Medication 100 MILLIGRAM(S): at 22:22

## 2017-01-31 RX ADMIN — HEPARIN SODIUM 5000 UNIT(S): 5000 INJECTION INTRAVENOUS; SUBCUTANEOUS at 05:32

## 2017-01-31 RX ADMIN — Medication 50 MILLIGRAM(S): at 05:32

## 2017-01-31 RX ADMIN — ONDANSETRON 4 MILLIGRAM(S): 8 TABLET, FILM COATED ORAL at 23:56

## 2017-01-31 RX ADMIN — SENNA PLUS 2 TABLET(S): 8.6 TABLET ORAL at 22:22

## 2017-01-31 RX ADMIN — HEPARIN SODIUM 5000 UNIT(S): 5000 INJECTION INTRAVENOUS; SUBCUTANEOUS at 17:06

## 2017-01-31 RX ADMIN — ONDANSETRON 4 MILLIGRAM(S): 8 TABLET, FILM COATED ORAL at 13:01

## 2017-01-31 RX ADMIN — Medication 100 MILLIGRAM(S): at 05:32

## 2017-01-31 RX ADMIN — Medication 5 MILLIGRAM(S): at 22:22

## 2017-01-31 NOTE — PROGRESS NOTE ADULT - SUBJECTIVE AND OBJECTIVE BOX
Patient is a 56y old  Male who presents with a chief complaint of abdominal pain x 1 week       INTERVAL HPI/OVERNIGHT EVENTS:  feels well, pain in control    MEDICATIONS  (STANDING):  influenza   Vaccine 0.5milliLiter(s) IntraMuscular once  chlorhexidine 4% Liquid 1Application(s) Topical daily  metoprolol 50milliGRAM(s) Oral two times a day  senna 2Tablet(s) Oral at bedtime  docusate sodium 100milliGRAM(s) Oral three times a day  sodium chloride 0.9%. 1000milliLiter(s) IV Continuous <Continuous>  bisacodyl 5milliGRAM(s) Oral at bedtime  morphine  - Injectable 4milliGRAM(s) IV Push once  heparin  Injectable 5000Unit(s) SubCutaneous every 12 hours  ondansetron Injectable     ondansetron Injectable 4milliGRAM(s) IV Push every 6 hours  cefTRIAXone   IVPB 1Gram(s) IV Intermittent every 24 hours    MEDICATIONS  (PRN):  acetaminophen   Tablet 650milliGRAM(s) Oral every 6 hours PRN For Temp greater than 38 C (100.4 F)  morphine  - Injectable 4milliGRAM(s) IV Push every 4 hours PRN Severe Pain (7 - 10)  morphine  - Injectable 2milliGRAM(s) IV Push every 6 hours PRN Severe Pain (7 - 10)  oxyCODONE  5 mG/acetaminophen 325 mG 2Tablet(s) Oral every 6 hours PRN Moderate Pain (4 - 6)  ALBUTerol    90 MICROgram(s) HFA Inhaler 2Puff(s) Inhalation every 6 hours PRN Shortness of Breath and/or Wheezing      Allergies    No Known Allergies    Intolerances        REVIEW OF SYSTEMS:  CONSTITUTIONAL: No fever, weight loss, or fatigue  EYES: No eye pain, visual disturbances, or discharge  ENMT:  No difficulty hearing, tinnitus, vertigo; No sinus or throat pain  NECK: No pain or stiffness  BREASTS: No pain, masses, or nipple discharge  RESPIRATORY: No cough, wheezing, chills or hemoptysis; No shortness of breath  CARDIOVASCULAR: No chest pain, palpitations, dizziness, or leg swelling  GASTROINTESTINAL:mild abdominal pain at surgical site. No nausea, vomiting, or hematemesis; No diarrhea or constipation. No melena or hematochezia.  GENITOURINARY: No dysuria, frequency, hematuria, or incontinence  NEUROLOGICAL: No headaches, memory loss, loss of strength, numbness, or tremors  SKIN: No itching, burning, rashes, or lesions   LYMPH NODES: No enlarged glands  ENDOCRINE: No heat or cold intolerance; No hair loss  MUSCULOSKELETAL: No joint pain or swelling; No muscle, back, or extremity pain  PSYCHIATRIC: No depression, anxiety, mood swings, or difficulty sleeping  HEME/LYMPH: No easy bruising, or bleeding gums  ALLERGY AND IMMUNOLOGIC: No hives or eczema    Vital Signs Last 24 Hrs  T(C): 37.7, Max: 37.7 (01-31 @ 17:20)  T(F): 99.8, Max: 99.8 (01-31 @ 17:20)  HR: 71 (71 - 75)  BP: 154/89 (128/86 - 155/83)  BP(mean): --  RR: 14 (14 - 16)  SpO2: 98% (96% - 98%)    PHYSICAL EXAM:  GENERAL: NAD, well-groomed, well-developed  HEAD:  Atraumatic, Normocephalic  EYES: EOMI, PERRLA, conjunctiva and sclera clear  ENMT: No tonsillar erythema, exudates, or enlargement; Moist mucous membranes, Good dentition, No lesions  NECK: Supple, No JVD, Normal thyroid  NERVOUS SYSTEM:  Alert & Oriented X3, Good concentration; Motor Strength 5/5 B/L upper and lower extremities; DTRs 2+ intact and symmetric  CHEST/LUNG: Clear to percussion bilaterally; No rales, rhonchi, wheezing, or rubs  HEART: Regular rate and rhythm; No murmurs, rubs, or gallops  ABDOMEN:soft, wound vac in place, mild TTP, dressing c/d/i  EXTREMITIES:  2+ Peripheral Pulses, No clubbing, cyanosis, or edema  LYMPH: No lymphadenopathy noted  SKIN: No rashes or lesions    LABS:                        10.8   8.7   )-----------( 280      ( 30 Jan 2017 06:16 )             30.4     31 Jan 2017 10:50    139    |  103    |  42     ----------------------------<  130    4.0     |  31     |  5.80     Ca    8.2        31 Jan 2017 10:50    TPro  6.3    /  Alb  2.0    /  TBili  0.4    /  DBili  x      /  AST  23     /  ALT  <6     /  AlkPhos  54     30 Jan 2017 06:16        CAPILLARY BLOOD GLUCOSE      RADIOLOGY & ADDITIONAL TESTS:    Imaging Personally Reviewed:  [ ] YES  [ ] NO    Consultant(s) Notes Reviewed:  [ ] YES  [ ] NO    Care Discussed with Consultants/Other Providers [ ] YES  [ ] NO

## 2017-01-31 NOTE — PROGRESS NOTE ADULT - SUBJECTIVE AND OBJECTIVE BOX
Patient is a 56y old  Male who presents with a chief complaint of abdominal pain x 1 week (22 Jan 2017 14:02)      INTERVAL HPI / OVERNIGHT EVENTS: denies compliants    MEDICATIONS  (STANDING):  influenza   Vaccine 0.5milliLiter(s) IntraMuscular once  chlorhexidine 4% Liquid 1Application(s) Topical daily  metoprolol 50milliGRAM(s) Oral two times a day  senna 2Tablet(s) Oral at bedtime  docusate sodium 100milliGRAM(s) Oral three times a day  sodium chloride 0.9%. 1000milliLiter(s) IV Continuous <Continuous>  bisacodyl 5milliGRAM(s) Oral at bedtime  morphine  - Injectable 4milliGRAM(s) IV Push once  heparin  Injectable 5000Unit(s) SubCutaneous every 12 hours  ondansetron Injectable     ondansetron Injectable 4milliGRAM(s) IV Push every 6 hours  cefTRIAXone   IVPB 1Gram(s) IV Intermittent every 24 hours    MEDICATIONS  (PRN):  acetaminophen   Tablet 650milliGRAM(s) Oral every 6 hours PRN For Temp greater than 38 C (100.4 F)  morphine  - Injectable 4milliGRAM(s) IV Push every 4 hours PRN Severe Pain (7 - 10)  morphine  - Injectable 2milliGRAM(s) IV Push every 6 hours PRN Severe Pain (7 - 10)  oxyCODONE  5 mG/acetaminophen 325 mG 2Tablet(s) Oral every 6 hours PRN Moderate Pain (4 - 6)  ALBUTerol    90 MICROgram(s) HFA Inhaler 2Puff(s) Inhalation every 6 hours PRN Shortness of Breath and/or Wheezing      Vital Signs Last 24 Hrs  Tmax-afebrile    PHYSICAL EXAM:    Constitutional: NAD, well-groomed, well-developed  Respiratory: CTAB/L  Cardiovascular: S1 and S2, RRR, no M/G/R  Gastrointestinal: BS+, soft, s/p wound vac placement  Extremities: No peripheral edema  Vascular: 2+ peripheral pulses  Skin: as above in abdo exam      LABS:  WBC -WNL         Cr 5.8            MICROBIOLOGY:  RECENT CULTURES:  01-27 .Blood Blood-Peripheral XXXX XXXX   No growth at 5 days.    01-27 .Blood Blood-Peripheral XXXX XXXX   No growth at 5 days.          RADIOLOGY & ADDITIONAL STUDIES:

## 2017-01-31 NOTE — PROGRESS NOTE ADULT - SUBJECTIVE AND OBJECTIVE BOX
s/p wound debridement and wound vac placement on 1/27/2017.    Patient seen and examined bedside resting comfortably.  Pt admits to mild postoperative abdominal pain. Denies any other discomforts.  Denies nausea and vomiting. Tolerating diet.  + flatus/BM.   Denies chest pain, dyspnea, cough.    T(F): 98.6, Max: 99.6 (01-30 @ 23:52)  HR: 75 (75 - 77)  BP: 128/86 (128/86 - 155/83)  RR: 14 (14 - 16)  SpO2: 98% (96% - 99%)  Wt(kg): --  CAPILLARY BLOOD GLUCOSE      PHYSICAL EXAM:  General: NAD, WDWN.   Neuro:  Alert & oriented x 3  HEENT: NCAT, EOMI, conjunctiva clear  CV: +S1+S2 regular rate and rhythm  Lung: clear to ausculation bilaterally, respirations nonlabored, good inspiratory effort  Abdomen: soft, NTND. Normactive BS. wound vac intact and functioning, c/d/i, with serous/sanguinous drainage.   Extremities: no pedal edema or calf tenderness noted   : No CVA or SP tenderness    LABS:                        10.8   8.7   )-----------( 280      ( 30 Jan 2017 06:16 )             30.4     31 Jan 2017 10:50    139    |  103    |  42     ----------------------------<  130    4.0     |  31     |  5.80     Ca    8.2        31 Jan 2017 10:50    TPro  6.3    /  Alb  2.0    /  TBili  0.4    /  DBili  x      /  AST  23     /  ALT  <6     /  AlkPhos  54     30 Jan 2017 06:16      I&O's Detail    I & Os for current day (as of 31 Jan 2017 15:57)  =============================================  IN:    Oral Fluid: 260 ml    Total IN: 260 ml  ---------------------------------------------  OUT:    Voided: 500 ml    Total OUT: 500 ml  ---------------------------------------------  Total NET: -240 ml    RADIOLOGIC FINDINGS:  US RENAL 1/30/2017  IMPRESSION:    1.  No hydronephrosis of either kidney.  2.  Diffuse hepatic steatosis.      Impression: 55y/o male with PMHx of HTN, ETOH abuse, s/p I+D of infected hematoma (1/23/17) with return to OR for hemostasis same day, s/p wound debridement and wound vac placement on 1/27/2017. now s/p wound vac exchange on 1/30.17, KANE with suspected ATN likely 2/2 vanco.    Plan:  -continue wound vac care with wound vac exchange every other day.  -ATN stable as per nephrologist. renal function trending down. continue monitoring renal function. f/u with nephrology  -continue antibiotics as per ID  -continue VTE and GI prophylaxis: heparin and protonix IV, PT, OOB, Ambulate, incentive spirometry use  -will discuss with surgical attending

## 2017-01-31 NOTE — PROGRESS NOTE ADULT - SUBJECTIVE AND OBJECTIVE BOX
Patient seen in follow up for KANE. Feels well. no distress.    MEDICATIONS  (STANDING):  influenza   Vaccine 0.5milliLiter(s) IntraMuscular once  chlorhexidine 4% Liquid 1Application(s) Topical daily  metoprolol 50milliGRAM(s) Oral two times a day  senna 2Tablet(s) Oral at bedtime  docusate sodium 100milliGRAM(s) Oral three times a day  sodium chloride 0.9%. 1000milliLiter(s) IV Continuous <Continuous>  bisacodyl 5milliGRAM(s) Oral at bedtime  morphine  - Injectable 4milliGRAM(s) IV Push once  heparin  Injectable 5000Unit(s) SubCutaneous every 12 hours  ondansetron Injectable     ondansetron Injectable 4milliGRAM(s) IV Push every 6 hours  cefTRIAXone   IVPB 1Gram(s) IV Intermittent every 24 hours    MEDICATIONS  (PRN):  acetaminophen   Tablet 650milliGRAM(s) Oral every 6 hours PRN For Temp greater than 38 C (100.4 F)  morphine  - Injectable 4milliGRAM(s) IV Push every 4 hours PRN Severe Pain (7 - 10)  morphine  - Injectable 2milliGRAM(s) IV Push every 6 hours PRN Severe Pain (7 - 10)  oxyCODONE  5 mG/acetaminophen 325 mG 2Tablet(s) Oral every 6 hours PRN Moderate Pain (4 - 6)    PHYSICAL EXAM:      T(C): 37, Max: 37.6 (01-30 @ 23:52)  HR: 75 (75 - 77)  BP: 128/86 (128/86 - 155/83)  RR: 14 (14 - 16)  SpO2: 98% (96% - 99%)  Wt(kg): --  Respiratory: clear anteriorly, decreased BS at bases  Cardiovascular: S1 S2  Gastrointestinal: soft NT ND +BS wound vac in place  Extremities:    no edema                                    10.8   8.7   )-----------( 280      ( 30 Jan 2017 06:16 )             30.4     31 Jan 2017 10:50    139    |  103    |  42     ----------------------------<  130    4.0     |  31     |  5.80     Ca    8.2        31 Jan 2017 10:50    TPro  6.3    /  Alb  2.0    /  TBili  0.4    /  DBili  x      /  AST  23     /  ALT  <6     /  AlkPhos  54     30 Jan 2017 06:16      LIVER FUNCTIONS - ( 30 Jan 2017 06:16 )  Alb: 2.0 g/dL / Pro: 6.3 gm/dL / ALK PHOS: 54 U/L / ALT: <6 U/L / AST: 23 U/L / GGT: x             Assessment and Plan:    KANE suspected ATN, improving.  Will follow.

## 2017-02-01 LAB
ANION GAP SERPL CALC-SCNC: 8 MMOL/L — SIGNIFICANT CHANGE UP (ref 5–17)
BUN SERPL-MCNC: 40 MG/DL — HIGH (ref 7–23)
CALCIUM SERPL-MCNC: 7.9 MG/DL — LOW (ref 8.5–10.1)
CHLORIDE SERPL-SCNC: 103 MMOL/L — SIGNIFICANT CHANGE UP (ref 96–108)
CO2 SERPL-SCNC: 28 MMOL/L — SIGNIFICANT CHANGE UP (ref 22–31)
CREAT SERPL-MCNC: 5.02 MG/DL — HIGH (ref 0.5–1.3)
CULTURE RESULTS: SIGNIFICANT CHANGE UP
CULTURE RESULTS: SIGNIFICANT CHANGE UP
GLUCOSE SERPL-MCNC: 93 MG/DL — SIGNIFICANT CHANGE UP (ref 70–99)
HCT VFR BLD CALC: 27.2 % — LOW (ref 39–50)
HGB BLD-MCNC: 9.8 G/DL — LOW (ref 13–17)
MCHC RBC-ENTMCNC: 33.8 PG — SIGNIFICANT CHANGE UP (ref 27–34)
MCHC RBC-ENTMCNC: 35.9 GM/DL — SIGNIFICANT CHANGE UP (ref 32–36)
MCV RBC AUTO: 94.2 FL — SIGNIFICANT CHANGE UP (ref 80–100)
PLATELET # BLD AUTO: 357 K/UL — SIGNIFICANT CHANGE UP (ref 150–400)
POTASSIUM SERPL-MCNC: 3.8 MMOL/L — SIGNIFICANT CHANGE UP (ref 3.5–5.3)
POTASSIUM SERPL-SCNC: 3.8 MMOL/L — SIGNIFICANT CHANGE UP (ref 3.5–5.3)
RBC # BLD: 2.89 M/UL — LOW (ref 4.2–5.8)
RBC # FLD: 12.4 % — SIGNIFICANT CHANGE UP (ref 11–15)
SODIUM SERPL-SCNC: 139 MMOL/L — SIGNIFICANT CHANGE UP (ref 135–145)
SPECIMEN SOURCE: SIGNIFICANT CHANGE UP
SPECIMEN SOURCE: SIGNIFICANT CHANGE UP
WBC # BLD: 7 K/UL — SIGNIFICANT CHANGE UP (ref 3.8–10.5)
WBC # FLD AUTO: 7 K/UL — SIGNIFICANT CHANGE UP (ref 3.8–10.5)

## 2017-02-01 PROCEDURE — 99233 SBSQ HOSP IP/OBS HIGH 50: CPT

## 2017-02-01 PROCEDURE — 99232 SBSQ HOSP IP/OBS MODERATE 35: CPT

## 2017-02-01 RX ORDER — AMLODIPINE BESYLATE 2.5 MG/1
5 TABLET ORAL DAILY
Qty: 0 | Refills: 0 | Status: DISCONTINUED | OUTPATIENT
Start: 2017-02-01 | End: 2017-02-03

## 2017-02-01 RX ADMIN — Medication 100 MILLIGRAM(S): at 05:56

## 2017-02-01 RX ADMIN — AMLODIPINE BESYLATE 5 MILLIGRAM(S): 2.5 TABLET ORAL at 18:16

## 2017-02-01 RX ADMIN — Medication 650 MILLIGRAM(S): at 18:53

## 2017-02-01 RX ADMIN — HEPARIN SODIUM 5000 UNIT(S): 5000 INJECTION INTRAVENOUS; SUBCUTANEOUS at 18:07

## 2017-02-01 RX ADMIN — HEPARIN SODIUM 5000 UNIT(S): 5000 INJECTION INTRAVENOUS; SUBCUTANEOUS at 05:56

## 2017-02-01 RX ADMIN — ONDANSETRON 4 MILLIGRAM(S): 8 TABLET, FILM COATED ORAL at 05:56

## 2017-02-01 RX ADMIN — CEFTRIAXONE 100 GRAM(S): 500 INJECTION, POWDER, FOR SOLUTION INTRAMUSCULAR; INTRAVENOUS at 18:16

## 2017-02-01 RX ADMIN — SENNA PLUS 2 TABLET(S): 8.6 TABLET ORAL at 22:03

## 2017-02-01 RX ADMIN — Medication 5 MILLIGRAM(S): at 22:03

## 2017-02-01 RX ADMIN — Medication 50 MILLIGRAM(S): at 18:08

## 2017-02-01 RX ADMIN — Medication 50 MILLIGRAM(S): at 05:57

## 2017-02-01 RX ADMIN — Medication 100 MILLIGRAM(S): at 18:07

## 2017-02-01 RX ADMIN — Medication 100 MILLIGRAM(S): at 22:03

## 2017-02-01 NOTE — PROGRESS NOTE ADULT - SUBJECTIVE AND OBJECTIVE BOX
Patient is a 56y old  Male who presents with a chief complaint of abdominal pain x 1 week (22 Jan 2017 14:02)      INTERVAL HPI / OVERNIGHT EVENTS: denies any complaints    MEDICATIONS  (STANDING):  influenza   Vaccine 0.5milliLiter(s) IntraMuscular once  chlorhexidine 4% Liquid 1Application(s) Topical daily  metoprolol 50milliGRAM(s) Oral two times a day  senna 2Tablet(s) Oral at bedtime  docusate sodium 100milliGRAM(s) Oral three times a day  bisacodyl 5milliGRAM(s) Oral at bedtime  morphine  - Injectable 4milliGRAM(s) IV Push once  heparin  Injectable 5000Unit(s) SubCutaneous every 12 hours  ondansetron Injectable     ondansetron Injectable 4milliGRAM(s) IV Push every 6 hours  cefTRIAXone   IVPB 1Gram(s) IV Intermittent every 24 hours  amLODIPine   Tablet 5milliGRAM(s) Oral daily    MEDICATIONS  (PRN):  acetaminophen   Tablet 650milliGRAM(s) Oral every 6 hours PRN For Temp greater than 38 C (100.4 F)  morphine  - Injectable 4milliGRAM(s) IV Push every 4 hours PRN Severe Pain (7 - 10)  morphine  - Injectable 2milliGRAM(s) IV Push every 6 hours PRN Severe Pain (7 - 10)  oxyCODONE  5 mG/acetaminophen 325 mG 2Tablet(s) Oral every 6 hours PRN Moderate Pain (4 - 6)  ALBUTerol    90 MICROgram(s) HFA Inhaler 2Puff(s) Inhalation every 6 hours PRN Shortness of Breath and/or Wheezing      Vital Signs Last 24 Hrs  T(C): 36.8, Max: 37.1 (02-01 @ 05:05)  T(F): 98.2, Max: 98.8 (02-01 @ 05:05)  HR: 70 (70 - 80)  BP: 157/95 (141/84 - 165/98)  BP(mean): --  RR: 16 (15 - 17)  SpO2: 97% (96% - 99%)    PHYSICAL EXAM:    Constitutional: NAD, well-groomed, well-developed  Respiratory: CTAB/L  Cardiovascular: S1 and S2, RRR, no M/G/R  Gastrointestinal: BS+, soft, NT/ND  Extremities: No peripheral edema  Vascular: 2+ peripheral pulses  Skin: wound examined during vac change  Wound base clean with granulation tissue,no slough or necrotic tisue noted    LABS:                        9.8    7.0   )-----------( 357      ( 01 Feb 2017 07:37 )             27.2     01 Feb 2017 07:37    139    |  103    |  40     ----------------------------<  93     3.8     |  28     |  5.02     Ca    7.9        01 Feb 2017 07:37              MICROBIOLOGY:  RECENT CULTURES:  01-27 .Blood Blood-Peripheral XXXX XXXX   No growth at 5 days.    01-27 .Blood Blood-Peripheral XXXX XXXX   No growth at 5 days.          RADIOLOGY & ADDITIONAL STUDIES:

## 2017-02-01 NOTE — PROGRESS NOTE ADULT - SUBJECTIVE AND OBJECTIVE BOX
Surgical Attending Fan Tee MD  [phone]    Post Op Day #: 5     Procedure:  Wound exploration/wound VAC application.     Comfortable. Tolerating diet. Bowel function is normal. Ambulating. VAC in place with questionable small seal leak.      Vital Signs Last 24 Hrs  T(C): 37.1, Max: 37.7 (01-31 @ 17:20)  T(F): 98.8, Max: 99.8 (01-31 @ 17:20)  HR: 77 (71 - 77)  BP: 141/84 (128/86 - 154/89)  BP(mean): --  RR: 15 (14 - 15)  SpO2: 96% (96% - 98%)    I&O's Detail    I & Os for current day (as of 01 Feb 2017 08:28)  =============================================  IN:    Oral Fluid: 240 ml    Solution: 50 ml    Total IN: 290 ml  ---------------------------------------------  OUT:    Voided: 1200 ml    Total OUT: 1200 ml  ---------------------------------------------  Total NET: -910 ml                            9.8    7.0   )-----------( 357      ( 01 Feb 2017 07:37 )             27.2       01 Feb 2017 07:37    139    |  103    |  40     ----------------------------<  93     3.8     |  28     |  5.02     Ca    7.9        01 Feb 2017 07:37        Physical Exam:    VAC in place with collapsed black sponge. Small output.   Abdomen is otherwise soft and nondistended.

## 2017-02-01 NOTE — PROGRESS NOTE ADULT - SUBJECTIVE AND OBJECTIVE BOX
Patient is a 56y old  Male who presents with a chief complaint of abdominal pain x 1 week       INTERVAL HPI/OVERNIGHT EVENTS:  feels well, no significant pain    MEDICATIONS  (STANDING):  influenza   Vaccine 0.5milliLiter(s) IntraMuscular once  chlorhexidine 4% Liquid 1Application(s) Topical daily  metoprolol 50milliGRAM(s) Oral two times a day  senna 2Tablet(s) Oral at bedtime  docusate sodium 100milliGRAM(s) Oral three times a day  sodium chloride 0.9%. 1000milliLiter(s) IV Continuous <Continuous>  bisacodyl 5milliGRAM(s) Oral at bedtime  morphine  - Injectable 4milliGRAM(s) IV Push once  heparin  Injectable 5000Unit(s) SubCutaneous every 12 hours  ondansetron Injectable     ondansetron Injectable 4milliGRAM(s) IV Push every 6 hours  cefTRIAXone   IVPB 1Gram(s) IV Intermittent every 24 hours  amLODIPine   Tablet 5milliGRAM(s) Oral daily    MEDICATIONS  (PRN):  acetaminophen   Tablet 650milliGRAM(s) Oral every 6 hours PRN For Temp greater than 38 C (100.4 F)  morphine  - Injectable 4milliGRAM(s) IV Push every 4 hours PRN Severe Pain (7 - 10)  morphine  - Injectable 2milliGRAM(s) IV Push every 6 hours PRN Severe Pain (7 - 10)  oxyCODONE  5 mG/acetaminophen 325 mG 2Tablet(s) Oral every 6 hours PRN Moderate Pain (4 - 6)  ALBUTerol    90 MICROgram(s) HFA Inhaler 2Puff(s) Inhalation every 6 hours PRN Shortness of Breath and/or Wheezing      Allergies    No Known Allergies    Intolerances        REVIEW OF SYSTEMS:  CONSTITUTIONAL: No fever, weight loss, or fatigue  EYES: No eye pain, visual disturbances, or discharge  ENMT:  No difficulty hearing, tinnitus, vertigo; No sinus or throat pain  NECK: No pain or stiffness  BREASTS: No pain, masses, or nipple discharge  RESPIRATORY: No cough, wheezing, chills or hemoptysis; No shortness of breath  CARDIOVASCULAR: No chest pain, palpitations, dizziness, or leg swelling  GASTROINTESTINAL: mild TTP. No nausea, vomiting, or hematemesis; No diarrhea or constipation. No melena or hematochezia.  GENITOURINARY: No dysuria, frequency, hematuria, or incontinence  NEUROLOGICAL: No headaches, memory loss, loss of strength, numbness, or tremors  SKIN: No itching, burning, rashes, or lesions   LYMPH NODES: No enlarged glands  ENDOCRINE: No heat or cold intolerance; No hair loss  MUSCULOSKELETAL: No joint pain or swelling; No muscle, back, or extremity pain  PSYCHIATRIC: No depression, anxiety, mood swings, or difficulty sleeping  HEME/LYMPH: No easy bruising, or bleeding gums  ALLERGY AND IMMUNOLOGIC: No hives or eczema    Vital Signs Last 24 Hrs  T(C): 36.8, Max: 37.1 (02-01 @ 05:05)  T(F): 98.2, Max: 98.8 (02-01 @ 05:05)  HR: 70 (70 - 80)  BP: 157/95 (141/84 - 165/98)  BP(mean): --  RR: 16 (15 - 17)  SpO2: 97% (96% - 99%)    PHYSICAL EXAM:  GENERAL: NAD, well-groomed, well-developed  HEAD:  Atraumatic, Normocephalic  EYES: EOMI, PERRLA, conjunctiva and sclera clear  ENMT: No tonsillar erythema, exudates, or enlargement; Moist mucous membranes, Good dentition, No lesions  NECK: Supple, No JVD, Normal thyroid  NERVOUS SYSTEM:  Alert & Oriented X3, Good concentration; Motor Strength 5/5 B/L upper and lower extremities; DTRs 2+ intact and symmetric  CHEST/LUNG: Clear to percussion bilaterally; No rales, rhonchi, wheezing, or rubs  HEART: Regular rate and rhythm; No murmurs, rubs, or gallops  ABDOMEN: Soft, wound vac in place, dressing c/d/i  EXTREMITIES:  2+ Peripheral Pulses, No clubbing, cyanosis, or edema  LYMPH: No lymphadenopathy noted  SKIN: No rashes or lesions    LABS:                        9.8    7.0   )-----------( 357      ( 01 Feb 2017 07:37 )             27.2     01 Feb 2017 07:37    139    |  103    |  40     ----------------------------<  93     3.8     |  28     |  5.02     Ca    7.9        01 Feb 2017 07:37          CAPILLARY BLOOD GLUCOSE      RADIOLOGY & ADDITIONAL TESTS:    Imaging Personally Reviewed:  [ ] YES  [ ] NO    Consultant(s) Notes Reviewed:  [ ] YES  [ ] NO    Care Discussed with Consultants/Other Providers [ ] YES  [ ] NO

## 2017-02-01 NOTE — PROGRESS NOTE ADULT - SUBJECTIVE AND OBJECTIVE BOX
Patient seen in follow up for KANE. No distress. Feels well.    MEDICATIONS  (STANDING):  influenza   Vaccine 0.5milliLiter(s) IntraMuscular once  chlorhexidine 4% Liquid 1Application(s) Topical daily  metoprolol 50milliGRAM(s) Oral two times a day  senna 2Tablet(s) Oral at bedtime  docusate sodium 100milliGRAM(s) Oral three times a day  sodium chloride 0.9%. 1000milliLiter(s) IV Continuous <Continuous>  bisacodyl 5milliGRAM(s) Oral at bedtime  morphine  - Injectable 4milliGRAM(s) IV Push once  heparin  Injectable 5000Unit(s) SubCutaneous every 12 hours  ondansetron Injectable     ondansetron Injectable 4milliGRAM(s) IV Push every 6 hours  cefTRIAXone   IVPB 1Gram(s) IV Intermittent every 24 hours    MEDICATIONS  (PRN):  acetaminophen   Tablet 650milliGRAM(s) Oral every 6 hours PRN For Temp greater than 38 C (100.4 F)  morphine  - Injectable 4milliGRAM(s) IV Push every 4 hours PRN Severe Pain (7 - 10)  morphine  - Injectable 2milliGRAM(s) IV Push every 6 hours PRN Severe Pain (7 - 10)  oxyCODONE  5 mG/acetaminophen 325 mG 2Tablet(s) Oral every 6 hours PRN Moderate Pain (4 - 6)  ALBUTerol    90 MICROgram(s) HFA Inhaler 2Puff(s) Inhalation every 6 hours PRN Shortness of Breath and/or Wheezing    PHYSICAL EXAM:      T(C): 37.1, Max: 37.7 (01-31 @ 17:20)  HR: 80 (71 - 80)  BP: 150/95 (128/86 - 154/89)  RR: 15 (14 - 15)  SpO2: 99% (96% - 99%)  Wt(kg): --  Respiratory: clear anteriorly, decreased BS at bases  Cardiovascular: S1 S2  Gastrointestinal: soft NT ND +BS wound vac  Extremities:    no edema                                    9.8    7.0   )-----------( 357      ( 01 Feb 2017 07:37 )             27.2     01 Feb 2017 07:37    139    |  103    |  40     ----------------------------<  93     3.8     |  28     |  5.02     Ca    7.9        01 Feb 2017 07:37            Assessment and Plan:  KANE, improving trend.  Supportive care.  No objection to d/c from renal view as long as blood work checked with 48 hrs of d/c and follow up with pmd.

## 2017-02-01 NOTE — PROGRESS NOTE ADULT - SUBJECTIVE AND OBJECTIVE BOX
Pt discussed with discharge planning nurse.  She is attempting to arrange for home wound VAC. There are problems with the approval because she needs documentation from the pt..  Will continue wit inpatient wound VAC. Dresing should be changed today by physical therapy wound care team.

## 2017-02-02 LAB
ANION GAP SERPL CALC-SCNC: 10 MMOL/L — SIGNIFICANT CHANGE UP (ref 5–17)
BUN SERPL-MCNC: 36 MG/DL — HIGH (ref 7–23)
CALCIUM SERPL-MCNC: 8.4 MG/DL — LOW (ref 8.5–10.1)
CHLORIDE SERPL-SCNC: 101 MMOL/L — SIGNIFICANT CHANGE UP (ref 96–108)
CO2 SERPL-SCNC: 28 MMOL/L — SIGNIFICANT CHANGE UP (ref 22–31)
CREAT SERPL-MCNC: 4.06 MG/DL — HIGH (ref 0.5–1.3)
GLUCOSE SERPL-MCNC: 92 MG/DL — SIGNIFICANT CHANGE UP (ref 70–99)
HCT VFR BLD CALC: 30.4 % — LOW (ref 39–50)
HGB BLD-MCNC: 11.1 G/DL — LOW (ref 13–17)
MCHC RBC-ENTMCNC: 34.2 PG — HIGH (ref 27–34)
MCHC RBC-ENTMCNC: 36.4 GM/DL — HIGH (ref 32–36)
MCV RBC AUTO: 94.1 FL — SIGNIFICANT CHANGE UP (ref 80–100)
PLATELET # BLD AUTO: 374 K/UL — SIGNIFICANT CHANGE UP (ref 150–400)
POTASSIUM SERPL-MCNC: 4 MMOL/L — SIGNIFICANT CHANGE UP (ref 3.5–5.3)
POTASSIUM SERPL-SCNC: 4 MMOL/L — SIGNIFICANT CHANGE UP (ref 3.5–5.3)
RBC # BLD: 3.23 M/UL — LOW (ref 4.2–5.8)
RBC # FLD: 12.8 % — SIGNIFICANT CHANGE UP (ref 11–15)
SODIUM SERPL-SCNC: 139 MMOL/L — SIGNIFICANT CHANGE UP (ref 135–145)
WBC # BLD: 6.9 K/UL — SIGNIFICANT CHANGE UP (ref 3.8–10.5)
WBC # FLD AUTO: 6.9 K/UL — SIGNIFICANT CHANGE UP (ref 3.8–10.5)

## 2017-02-02 PROCEDURE — 99233 SBSQ HOSP IP/OBS HIGH 50: CPT

## 2017-02-02 PROCEDURE — 99232 SBSQ HOSP IP/OBS MODERATE 35: CPT

## 2017-02-02 RX ADMIN — AMLODIPINE BESYLATE 5 MILLIGRAM(S): 2.5 TABLET ORAL at 06:19

## 2017-02-02 RX ADMIN — Medication 50 MILLIGRAM(S): at 17:14

## 2017-02-02 RX ADMIN — SENNA PLUS 2 TABLET(S): 8.6 TABLET ORAL at 22:11

## 2017-02-02 RX ADMIN — Medication 650 MILLIGRAM(S): at 16:47

## 2017-02-02 RX ADMIN — HEPARIN SODIUM 5000 UNIT(S): 5000 INJECTION INTRAVENOUS; SUBCUTANEOUS at 17:15

## 2017-02-02 RX ADMIN — Medication 100 MILLIGRAM(S): at 06:19

## 2017-02-02 RX ADMIN — Medication 100 MILLIGRAM(S): at 22:11

## 2017-02-02 RX ADMIN — Medication 5 MILLIGRAM(S): at 22:11

## 2017-02-02 RX ADMIN — HEPARIN SODIUM 5000 UNIT(S): 5000 INJECTION INTRAVENOUS; SUBCUTANEOUS at 06:19

## 2017-02-02 RX ADMIN — Medication 50 MILLIGRAM(S): at 06:19

## 2017-02-02 RX ADMIN — Medication 100 MILLIGRAM(S): at 15:10

## 2017-02-02 RX ADMIN — CEFTRIAXONE 100 GRAM(S): 500 INJECTION, POWDER, FOR SOLUTION INTRAMUSCULAR; INTRAVENOUS at 17:14

## 2017-02-02 NOTE — PROGRESS NOTE ADULT - SUBJECTIVE AND OBJECTIVE BOX
s/p wound debridement and wound vac placement on 1/27/2017.    Patient seen and examined bedside resting comfortably.  Pt admits to mild postoperative abdominal pain. Denies any other discomforts.  Denies nausea and vomiting. Tolerating diet.  + flatus/BM.   Denies chest pain, dyspnea, cough.    Vital Signs Last 24 Hrs  T(C): 37.2, Max: 37.2 (02-02 @ 05:14)  T(F): 99, Max: 99 (02-02 @ 05:14)  HR: 78 (70 - 80)  BP: 150/93 (150/93 - 165/98)  BP(mean): --  RR: 16 (15 - 17)  SpO2: 98% (97% - 99%)        PHYSICAL EXAM:  General: NAD, WDWN.   Neuro:  Alert & oriented x 3  HEENT: NCAT, EOMI, conjunctiva clear  CV: +S1+S2 regular rate and rhythm  Lung: clear to ausculation bilaterally, respirations nonlabored, good inspiratory effort  Abdomen: soft, NTND. Normactive BS. wound vac intact and functioning, c/d/i, with scant serosanguinous drainage.   Extremities: no pedal edema or calf tenderness noted   : No CVA or SP tenderness    LABS:                        11.1   6.9   )-----------( 374      ( 02 Feb 2017 06:23 )             30.4              02 Feb 2017 06:23    139    |  101    |  36     ----------------------------<  92     4.0     |  28     |  4.06     Ca    8.4        02 Feb 2017 06:23              Impression: 55y/o male with PMHx of HTN, ETOH abuse, s/p I+D of infected hematoma (1/23/17) with return to OR for hemostasis same day, s/p wound debridement and wound vac placement on 1/27/2017. now s/p wound vac exchange on 1/30.17, KANE with suspected ATN likely 2/2 vanco-Resolving.    Plan:  -continue wound vac care with wound vac exchange every other day using BLACK FOAM ONLY as fascia is intact  -ATN stable as per nephrologist. renal function trending down. continue monitoring renal function. f/u with nephrology   -continue antibiotics as per ID  -continue VTE and GI prophylaxis: heparin and protonix IV, PT, OOB, Ambulate, incentive spirometry use  -D/C planning with services for Wound Care  -will discuss with surgical attending

## 2017-02-02 NOTE — PROGRESS NOTE ADULT - SUBJECTIVE AND OBJECTIVE BOX
Patient is a 56y old  Male who presents with a chief complaint of abdominal pain x 1 week (22 Jan 2017 14:02)      INTERVAL HPI / OVERNIGHT EVENTS: denies any complaints    MEDICATIONS  (STANDING):  influenza   Vaccine 0.5milliLiter(s) IntraMuscular once  chlorhexidine 4% Liquid 1Application(s) Topical daily  metoprolol 50milliGRAM(s) Oral two times a day  senna 2Tablet(s) Oral at bedtime  docusate sodium 100milliGRAM(s) Oral three times a day  bisacodyl 5milliGRAM(s) Oral at bedtime  morphine  - Injectable 4milliGRAM(s) IV Push once  heparin  Injectable 5000Unit(s) SubCutaneous every 12 hours  ondansetron Injectable     ondansetron Injectable 4milliGRAM(s) IV Push every 6 hours  cefTRIAXone   IVPB 1Gram(s) IV Intermittent every 24 hours  amLODIPine   Tablet 5milliGRAM(s) Oral daily    MEDICATIONS  (PRN):  acetaminophen   Tablet 650milliGRAM(s) Oral every 6 hours PRN For Temp greater than 38 C (100.4 F)  morphine  - Injectable 4milliGRAM(s) IV Push every 4 hours PRN Severe Pain (7 - 10)  morphine  - Injectable 2milliGRAM(s) IV Push every 6 hours PRN Severe Pain (7 - 10)  oxyCODONE  5 mG/acetaminophen 325 mG 2Tablet(s) Oral every 6 hours PRN Moderate Pain (4 - 6)  ALBUTerol    90 MICROgram(s) HFA Inhaler 2Puff(s) Inhalation every 6 hours PRN Shortness of Breath and/or Wheezing      Vital Signs Last 24 Hrs  Tmax-afebrile    PHYSICAL EXAM:    Constitutional: NAD, well-groomed, well-developed  Respiratory: CTAB/L  Cardiovascular: S1 and S2, RRR, no M/G/R  Gastrointestinal: BS+, soft, NT/ND  Extremities: No peripheral edema  Vascular: 2+ peripheral pulses  Skin: wound examined during vac change  Wound base clean with granulation tissue,no slough or necrotic tisue noted    LABS:             wbc-wnl  Cr 4.08              MICROBIOLOGY:  RECENT CULTURES:  01-27 .Blood Blood-Peripheral XXXX XXXX   No growth at 5 days.    01-27 .Blood Blood-Peripheral XXXX XXXX   No growth at 5 days.          RADIOLOGY & ADDITIONAL STUDIES:

## 2017-02-02 NOTE — PROGRESS NOTE ADULT - SUBJECTIVE AND OBJECTIVE BOX
Subjective: no complaints. Feels well      MEDICATIONS  (STANDING):  influenza   Vaccine 0.5milliLiter(s) IntraMuscular once  chlorhexidine 4% Liquid 1Application(s) Topical daily  metoprolol 50milliGRAM(s) Oral two times a day  senna 2Tablet(s) Oral at bedtime  docusate sodium 100milliGRAM(s) Oral three times a day  bisacodyl 5milliGRAM(s) Oral at bedtime  morphine  - Injectable 4milliGRAM(s) IV Push once  heparin  Injectable 5000Unit(s) SubCutaneous every 12 hours  ondansetron Injectable     ondansetron Injectable 4milliGRAM(s) IV Push every 6 hours  cefTRIAXone   IVPB 1Gram(s) IV Intermittent every 24 hours  amLODIPine   Tablet 5milliGRAM(s) Oral daily    MEDICATIONS  (PRN):  acetaminophen   Tablet 650milliGRAM(s) Oral every 6 hours PRN For Temp greater than 38 C (100.4 F)  morphine  - Injectable 4milliGRAM(s) IV Push every 4 hours PRN Severe Pain (7 - 10)  morphine  - Injectable 2milliGRAM(s) IV Push every 6 hours PRN Severe Pain (7 - 10)  oxyCODONE  5 mG/acetaminophen 325 mG 2Tablet(s) Oral every 6 hours PRN Moderate Pain (4 - 6)  ALBUTerol    90 MICROgram(s) HFA Inhaler 2Puff(s) Inhalation every 6 hours PRN Shortness of Breath and/or Wheezing          T(C): 37.2, Max: 37.2 (02-02 @ 05:14)  HR: 78 (70 - 79)  BP: 150/93 (150/93 - 165/98)  RR: 16 (16 - 17)  SpO2: 98% (97% - 98%)  Wt(kg): --        I&O's Detail    I & Os for current day (as of 02 Feb 2017 11:30)  =============================================  IN:    Oral Fluid: 260 ml    Total IN: 260 ml  ---------------------------------------------  OUT:    Voided: 600 ml    Total OUT: 600 ml  ---------------------------------------------  Total NET: -340 ml           PHYSICAL EXAM:    GENERAL: confortable  EYES: EOMI, PERRLA, conjunctiva and sclera clear  NECK: Supple, no inc in JVP  CHEST/LUNG: Clear  HEART: S1S2  ABDOMEN: Soft, Nontender, Nondistended; Bowel sounds present  EXTREMITIES:  no edema  NEURO: no asterixis      LABS:  CBC Full  -  ( 02 Feb 2017 06:23 )  WBC Count : 6.9 K/uL  Hemoglobin : 11.1 g/dL  Hematocrit : 30.4 %  Platelet Count - Automated : 374 K/uL  Mean Cell Volume : 94.1 fl  Mean Cell Hemoglobin : 34.2 pg  Mean Cell Hemoglobin Concentration : 36.4 gm/dL  Auto Neutrophil # : x  Auto Lymphocyte # : x  Auto Monocyte # : x  Auto Eosinophil # : x  Auto Basophil # : x  Auto Neutrophil % : x  Auto Lymphocyte % : x  Auto Monocyte % : x  Auto Eosinophil % : x  Auto Basophil % : x    02 Feb 2017 06:23    139    |  101    |  36     ----------------------------<  92     4.0     |  28     |  4.06     Ca    8.4        02 Feb 2017 06:23          ASSESSMENT and PLAN:    * KANE -- steadily improving from peak Cr 7.32. ATN physiology. Cont to trend Cr daily. No change in management from renal POV. May be dced to outpt f/u

## 2017-02-02 NOTE — PROGRESS NOTE ADULT - SUBJECTIVE AND OBJECTIVE BOX
Patient is a 56y old  Male who presents with a chief complaint of abdominal pain x 1 week (22 Jan 2017 14:02)      INTERVAL HPI/OVERNIGHT EVENTS:  no new complaints, feels well    MEDICATIONS  (STANDING):  influenza   Vaccine 0.5milliLiter(s) IntraMuscular once  chlorhexidine 4% Liquid 1Application(s) Topical daily  metoprolol 50milliGRAM(s) Oral two times a day  senna 2Tablet(s) Oral at bedtime  docusate sodium 100milliGRAM(s) Oral three times a day  bisacodyl 5milliGRAM(s) Oral at bedtime  morphine  - Injectable 4milliGRAM(s) IV Push once  heparin  Injectable 5000Unit(s) SubCutaneous every 12 hours  ondansetron Injectable     ondansetron Injectable 4milliGRAM(s) IV Push every 6 hours  cefTRIAXone   IVPB 1Gram(s) IV Intermittent every 24 hours  amLODIPine   Tablet 5milliGRAM(s) Oral daily    MEDICATIONS  (PRN):  acetaminophen   Tablet 650milliGRAM(s) Oral every 6 hours PRN For Temp greater than 38 C (100.4 F)  morphine  - Injectable 4milliGRAM(s) IV Push every 4 hours PRN Severe Pain (7 - 10)  morphine  - Injectable 2milliGRAM(s) IV Push every 6 hours PRN Severe Pain (7 - 10)  oxyCODONE  5 mG/acetaminophen 325 mG 2Tablet(s) Oral every 6 hours PRN Moderate Pain (4 - 6)  ALBUTerol    90 MICROgram(s) HFA Inhaler 2Puff(s) Inhalation every 6 hours PRN Shortness of Breath and/or Wheezing      Allergies    No Known Allergies    Intolerances        REVIEW OF SYSTEMS:  CONSTITUTIONAL: No fever, weight loss, or fatigue  EYES: No eye pain, visual disturbances, or discharge  ENMT:  No difficulty hearing, tinnitus, vertigo; No sinus or throat pain  NECK: No pain or stiffness  BREASTS: No pain, masses, or nipple discharge  RESPIRATORY: No cough, wheezing, chills or hemoptysis; No shortness of breath  CARDIOVASCULAR: No chest pain, palpitations, dizziness, or leg swelling  GASTROINTESTINAL: decreased abdominal pain No nausea, vomiting, or hematemesis; No diarrhea or constipation. No melena or hematochezia.  GENITOURINARY: No dysuria, frequency, hematuria, or incontinence  NEUROLOGICAL: No headaches, memory loss, loss of strength, numbness, or tremors  SKIN: No itching, burning, rashes, or lesions   LYMPH NODES: No enlarged glands  ENDOCRINE: No heat or cold intolerance; No hair loss  MUSCULOSKELETAL: No joint pain or swelling; No muscle, back, or extremity pain  PSYCHIATRIC: No depression, anxiety, mood swings, or difficulty sleeping  HEME/LYMPH: No easy bruising, or bleeding gums  ALLERGY AND IMMUNOLOGIC: No hives or eczema    Vital Signs Last 24 Hrs  T(C): 38.1, Max: 38.1 (02-02 @ 16:43)  T(F): 100.6, Max: 100.6 (02-02 @ 16:43)  HR: 75 (75 - 80)  BP: 159/89 (150/93 - 165/93)  BP(mean): --  RR: 18 (14 - 18)  SpO2: 100% (97% - 100%)    PHYSICAL EXAM:  GENERAL: NAD, well-groomed, well-developed  HEAD:  Atraumatic, Normocephalic  EYES: EOMI, PERRLA, conjunctiva and sclera clear  ENMT: No tonsillar erythema, exudates, or enlargement; Moist mucous membranes, Good dentition, No lesions  NECK: Supple, No JVD, Normal thyroid  NERVOUS SYSTEM:  Alert & Oriented X3, Good concentration; Motor Strength 5/5 B/L upper and lower extremities; DTRs 2+ intact and symmetric  CHEST/LUNG: Clear to percussion bilaterally; No rales, rhonchi, wheezing, or rubs  HEART: Regular rate and rhythm; No murmurs, rubs, or gallops  ABDOMEN: Soft,wound vac in place, c/d/i; Bowel sounds present  EXTREMITIES:  2+ Peripheral Pulses, No clubbing, cyanosis, or edema  LYMPH: No lymphadenopathy noted  SKIN: No rashes or lesions    LABS:                        11.1   6.9   )-----------( 374      ( 02 Feb 2017 06:23 )             30.4     02 Feb 2017 06:23    139    |  101    |  36     ----------------------------<  92     4.0     |  28     |  4.06     Ca    8.4        02 Feb 2017 06:23          CAPILLARY BLOOD GLUCOSE      RADIOLOGY & ADDITIONAL TESTS:    Imaging Personally Reviewed:  [ ] YES  [ ] NO    Consultant(s) Notes Reviewed:  [ x] YES  [ ] NO    Care Discussed with Consultants/Other Providers [x ] YES  [ ] NO

## 2017-02-03 LAB
ANION GAP SERPL CALC-SCNC: 7 MMOL/L — SIGNIFICANT CHANGE UP (ref 5–17)
BUN SERPL-MCNC: 25 MG/DL — HIGH (ref 7–23)
CALCIUM SERPL-MCNC: 8.2 MG/DL — LOW (ref 8.5–10.1)
CHLORIDE SERPL-SCNC: 102 MMOL/L — SIGNIFICANT CHANGE UP (ref 96–108)
CO2 SERPL-SCNC: 31 MMOL/L — SIGNIFICANT CHANGE UP (ref 22–31)
CREAT SERPL-MCNC: 3.01 MG/DL — HIGH (ref 0.5–1.3)
GLUCOSE SERPL-MCNC: 93 MG/DL — SIGNIFICANT CHANGE UP (ref 70–99)
POTASSIUM SERPL-MCNC: 3.9 MMOL/L — SIGNIFICANT CHANGE UP (ref 3.5–5.3)
POTASSIUM SERPL-SCNC: 3.9 MMOL/L — SIGNIFICANT CHANGE UP (ref 3.5–5.3)
SODIUM SERPL-SCNC: 140 MMOL/L — SIGNIFICANT CHANGE UP (ref 135–145)
STREP DNASE B TITR SER: < 95 U/ML — SIGNIFICANT CHANGE UP

## 2017-02-03 PROCEDURE — 99233 SBSQ HOSP IP/OBS HIGH 50: CPT

## 2017-02-03 PROCEDURE — 99232 SBSQ HOSP IP/OBS MODERATE 35: CPT

## 2017-02-03 RX ORDER — AMLODIPINE BESYLATE 2.5 MG/1
10 TABLET ORAL DAILY
Qty: 0 | Refills: 0 | Status: DISCONTINUED | OUTPATIENT
Start: 2017-02-03 | End: 2017-02-04

## 2017-02-03 RX ADMIN — ONDANSETRON 4 MILLIGRAM(S): 8 TABLET, FILM COATED ORAL at 11:13

## 2017-02-03 RX ADMIN — SENNA PLUS 2 TABLET(S): 8.6 TABLET ORAL at 22:00

## 2017-02-03 RX ADMIN — Medication 100 MILLIGRAM(S): at 22:00

## 2017-02-03 RX ADMIN — AMLODIPINE BESYLATE 5 MILLIGRAM(S): 2.5 TABLET ORAL at 07:39

## 2017-02-03 RX ADMIN — Medication 50 MILLIGRAM(S): at 05:29

## 2017-02-03 RX ADMIN — HEPARIN SODIUM 5000 UNIT(S): 5000 INJECTION INTRAVENOUS; SUBCUTANEOUS at 05:30

## 2017-02-03 RX ADMIN — CEFTRIAXONE 100 GRAM(S): 500 INJECTION, POWDER, FOR SOLUTION INTRAMUSCULAR; INTRAVENOUS at 17:21

## 2017-02-03 RX ADMIN — Medication 5 MILLIGRAM(S): at 22:00

## 2017-02-03 RX ADMIN — HEPARIN SODIUM 5000 UNIT(S): 5000 INJECTION INTRAVENOUS; SUBCUTANEOUS at 17:21

## 2017-02-03 RX ADMIN — Medication 100 MILLIGRAM(S): at 05:29

## 2017-02-03 RX ADMIN — Medication 50 MILLIGRAM(S): at 17:21

## 2017-02-03 RX ADMIN — ONDANSETRON 4 MILLIGRAM(S): 8 TABLET, FILM COATED ORAL at 17:21

## 2017-02-03 NOTE — DISCHARGE NOTE ADULT - CARE PROVIDER_API CALL
Nino Jones (MD), Surgery  [address] [facility] [place] [address]  Phone: [phone]  Fax: [phone]

## 2017-02-03 NOTE — PROGRESS NOTE ADULT - SUBJECTIVE AND OBJECTIVE BOX
Patient is a 56y old  Male who presents with a chief complaint of abdominal pain x 1 week (22 Jan 2017 14:02)      INTERVAL HPI/OVERNIGHT EVENTS:  feels well, no complaints    MEDICATIONS  (STANDING):  influenza   Vaccine 0.5milliLiter(s) IntraMuscular once  chlorhexidine 4% Liquid 1Application(s) Topical daily  metoprolol 50milliGRAM(s) Oral two times a day  senna 2Tablet(s) Oral at bedtime  docusate sodium 100milliGRAM(s) Oral three times a day  bisacodyl 5milliGRAM(s) Oral at bedtime  morphine  - Injectable 4milliGRAM(s) IV Push once  heparin  Injectable 5000Unit(s) SubCutaneous every 12 hours  ondansetron Injectable     ondansetron Injectable 4milliGRAM(s) IV Push every 6 hours  cefTRIAXone   IVPB 1Gram(s) IV Intermittent every 24 hours  amLODIPine   Tablet 5milliGRAM(s) Oral daily    MEDICATIONS  (PRN):  acetaminophen   Tablet 650milliGRAM(s) Oral every 6 hours PRN For Temp greater than 38 C (100.4 F)  morphine  - Injectable 2milliGRAM(s) IV Push every 6 hours PRN Severe Pain (7 - 10)  oxyCODONE  5 mG/acetaminophen 325 mG 2Tablet(s) Oral every 6 hours PRN Moderate Pain (4 - 6)  ALBUTerol    90 MICROgram(s) HFA Inhaler 2Puff(s) Inhalation every 6 hours PRN Shortness of Breath and/or Wheezing      Allergies    No Known Allergies    Intolerances        REVIEW OF SYSTEMS:  CONSTITUTIONAL: No fever, weight loss, or fatigue  EYES: No eye pain, visual disturbances, or discharge  ENMT:  No difficulty hearing, tinnitus, vertigo; No sinus or throat pain  NECK: No pain or stiffness  BREASTS: No pain, masses, or nipple discharge  RESPIRATORY: No cough, wheezing, chills or hemoptysis; No shortness of breath  CARDIOVASCULAR: No chest pain, palpitations, dizziness, or leg swelling  GASTROINTESTINALmild discomfort. No nausea, vomiting, or hematemesis; constipation resolved. No melena or hematochezia.  GENITOURINARY: No dysuria, frequency, hematuria, or incontinence  NEUROLOGICAL: No headaches, memory loss, loss of strength, numbness, or tremors  SKIN: No itching, burning, rashes, or lesions   LYMPH NODES: No enlarged glands  ENDOCRINE: No heat or cold intolerance; No hair loss  MUSCULOSKELETAL: No joint pain or swelling; No muscle, back, or extremity pain  PSYCHIATRIC: No depression, anxiety, mood swings, or difficulty sleeping  HEME/LYMPH: No easy bruising, or bleeding gums  ALLERGY AND IMMUNOLOGIC: No hives or eczema    Vital Signs Last 24 Hrs  T(C): 37.8, Max: 37.8 (02-03 @ 17:21)  T(F): 100, Max: 100 (02-03 @ 17:21)  HR: 85 (80 - 89)  BP: 164/94 (161/84 - 168/86)  BP(mean): --  RR: 16 (16 - 18)  SpO2: 98% (97% - 99%)    PHYSICAL EXAM:  GENERAL: NAD, well-groomed, well-developed  HEAD:  Atraumatic, Normocephalic  EYES: EOMI, PERRLA, conjunctiva and sclera clear  ENMT: No tonsillar erythema, exudates, or enlargement; Moist mucous membranes, Good dentition, No lesions  NECK: Supple, No JVD, Normal thyroid  NERVOUS SYSTEM:  Alert & Oriented X3, Good concentration; Motor Strength 5/5 B/L upper and lower extremities; DTRs 2+ intact and symmetric  CHEST/LUNG: Clear to percussion bilaterally; No rales, rhonchi, wheezing, or rubs  HEART: Regular rate and rhythm; No murmurs, rubs, or gallops  ABDOMEN: wound vac in place, soft, nt/nd  EXTREMITIES:  2+ Peripheral Pulses, No clubbing, cyanosis, or edema  LYMPH: No lymphadenopathy noted  SKIN: No rashes or lesions    LABS:                        11.1   6.9   )-----------( 374      ( 02 Feb 2017 06:23 )             30.4     03 Feb 2017 11:41    140    |  102    |  25     ----------------------------<  93     3.9     |  31     |  3.01     Ca    8.2        03 Feb 2017 11:41          CAPILLARY BLOOD GLUCOSE      RADIOLOGY & ADDITIONAL TESTS:    Imaging Personally Reviewed:  [x ] YES  [ ] NO    Consultant(s) Notes Reviewed:  [ x] YES  [ ] NO    Care Discussed with Consultants/Other Providers [ x] YES  [ ] NO

## 2017-02-03 NOTE — DISCHARGE NOTE ADULT - CARE PLAN
Principal Discharge DX:	Abdominal wall abscess  Goal:	Full recovery, wound healing  Instructions for follow-up, activity and diet:	Follow up with Dr. Jones on Tuesday 2/7, call office for appt  Secondary Diagnosis:	KANE (acute kidney injury)

## 2017-02-03 NOTE — PROGRESS NOTE ADULT - SUBJECTIVE AND OBJECTIVE BOX
56y Male admitted with ABDOMINAL WALL ABSCESS  ABDOMINAL PAIN AND SWELLING; R/O PARTIAL BOWEL OBSTRUCTION    Patient seen and examined bedside resting comfortably.  Mild abdominal wound pain. Denies any other discomforts.  Denies nausea and vomiting. Tolerating diet.  + flatus/BM.   Denies chest pain, dyspnea, cough.    T(F): 99.6, Max: 100.6 (02-02 @ 16:43)  HR: 89 (75 - 89)  BP: 163/84 (153/91 - 163/84)  RR: 18 (14 - 18)  SpO2: 99% (97% - 100%)  Wt(kg): --  CAPILLARY BLOOD GLUCOSE      PHYSICAL EXAM:  General: NAD, WDWN.   Neuro:  Alert & oriented x 3  HEENT: NCAT, EOMI, conjunctiva clear  CV: +S1+S2 regular rate and rhythm  Lung: clear to ausculation bilaterally, respirations nonlabored, good inspiratory effort  Abdomen: soft, NTND. Normal active BS. wound vac intact and functioning, c/d/i, with scant serosanguinous drainage.   Extremities: no pedal edema or calf tenderness noted   : No CVA or SP tenderness    LABS:                        11.1   6.9   )-----------( 374      ( 02 Feb 2017 06:23 )             30.4     02 Feb 2017 06:23    139    |  101    |  36     ----------------------------<  92     4.0     |  28     |  4.06     Ca    8.4        02 Feb 2017 06:23        I&O's Detail    I & Os for current day (as of 03 Feb 2017 08:48)  =============================================  IN:    Oral Fluid: 120 ml    Total IN: 120 ml  ---------------------------------------------  OUT:    Voided: 1400 ml    VAC (Vacuum Assisted Closure) System: 10 ml    Total OUT: 1410 ml  ---------------------------------------------  Total NET: -1290 ml      Impression: 55y/o male with PMHx of HTN, ETOH abuse, s/p I+D of infected hematoma (1/23/17) with return to OR for hemostasis same day, s/p wound debridement and wound vac placement on 1/27/2017. now s/p wound vac exchange on 1/30.17, KANE with suspected ATN likely 2/2 vanco-Resolving.    Plan:  -continue wound vac care with wound vac exchange every other day using BLACK FOAM ONLY as fascia is intact  -ATN stable as per nephrologist. continue monitoring renal function. -continue antibiotics as per ID  -continue VTE and GI prophylaxis: heparin and protonix IV, PT, OOB, Ambulate, incentive spirometry use  -D/C planning with services for Wound Care  -will discuss with surgical attending

## 2017-02-03 NOTE — PROGRESS NOTE ADULT - PROBLEM SELECTOR PLAN 2
as above
BP a little high , will start norvasc 5mg daily
BP a little high ,norvasc started yesterday can increase to 10mg if needed
BP still a little high will increase norvasc to 10mg
as above
hypertension under control
hypertension under control
no signs of symptoms of alcohol withdraw, CIWA/ativan protocal discontinued, reassess as needed
admit to tele  ciwa protocol with iv ativan taper  banana bag
as above
hypertension under control
symptom triggered ativan protocal
as above,[facility] Q [facility] -blood culture negative

## 2017-02-03 NOTE — PROGRESS NOTE ADULT - PROBLEM SELECTOR PLAN 5
resolved with bowel regimen. c/w bowel regimen
resolved with bowel regimen.
surgical management, packing as per surgery of abdominal wound  s/p 3 units of PRBC and 2 units of FFP, monitor for bleeding and H/H

## 2017-02-03 NOTE — DISCHARGE NOTE ADULT - PATIENT PORTAL LINK FT
“You can access the [facility] Patient Portal, offered by [facility], by registering with the following website: [email or web address]”

## 2017-02-03 NOTE — PROGRESS NOTE ADULT - PROBLEM SELECTOR PROBLEM 1
Abdominal wall abscess

## 2017-02-03 NOTE — PROGRESS NOTE ADULT - PROBLEM SELECTOR PLAN 4
appreciate renal input check bladder scan, renal ultrasound, dupont if evidence of retenion, monitor for improvement. abx changed.
appreciate renal input check bladder scan, renal ultrasound,shows no hydronephrosis, likely ATN improving
appreciate renal input check bladder scan, renal ultrasound,shows no hydronephrosis, likely ATN improving  cleared by renal for discharge home with close 48 hr fu
appreciate renal input check bladder scan, renal ultrasound,shows no hydronephrosis, likely ATN improving daily  cleared by renal for discharge home with close 48 hr fu
appreciate renal input check bladder scan, renal ultrasound,shows no hydronephrosis, likely ATN improving daily  cleared by renal for discharge home with close 48 hr fu
nebs prn
azotemia may be due toprerenal2 obstructive andor abdominal pressure plan will get bladder scanand renal consult
duarmaan prn
nebs prn

## 2017-02-03 NOTE — DISCHARGE NOTE ADULT - MEDICATION SUMMARY - MEDICATIONS TO TAKE
I will START or STAY ON the medications listed below when I get home from the hospital:    ibuprofen 600 mg oral tablet  --  by mouth   -- Indication: For Pain    lisinopril-hydroCHLOROthiazide 20mg-25mg oral tablet  --  by mouth   -- Indication: For Hypertension    albuterol  --  inhaled   -- Indication: For Mild intermittent asthma with acute exacerbation    cyclobenzaprine 5 mg oral tablet  -- 1 tab(s) by mouth 3 times a day  -- Indication: For Muscle spasm

## 2017-02-03 NOTE — DISCHARGE NOTE ADULT - ADDITIONAL INSTRUCTIONS
Wound vac dressing to midline wound at 125mmhg negative pressure, change Q72hrs.Please no heavy lifting  bending twisting pushing or pulling. NO antiinflammatory medications(like aspirin/Advil/naprosyn).Take OTC stool softeners as needed for constipation. Diet as tolerated. Call the doctor if any questions or concerns.

## 2017-02-03 NOTE — PROGRESS NOTE ADULT - PROBLEM SELECTOR PROBLEM 4
ATN (acute tubular necrosis)
Mild intermittent asthma with acute exacerbation
Azotemia
Mild intermittent asthma with acute exacerbation
Uncomplicated asthma, unspecified asthma severity

## 2017-02-03 NOTE — PROGRESS NOTE ADULT - PROBLEM SELECTOR PLAN 1
s/p I and D X 2 ,grew MSSA  On Rocephin-day # 12/14   wound base clean  wound vac present  Cont current wound care  finish antibiotics as planned
c/w vanco , zosyn, check blood cultures given persistent fevers  agree with ID consult  Pain management , current regimen not adequately controlling pain will increase morphine dosing, monitor
continue with surgical management, dressing changes, wound vac as per surgery, IV abx as per ID/ceftriaxone  pain in control
continue with surgical management, dressing changes, wound vac as per surgery, IV abx as per ID/ceftriaxone, fu recs for change to po abx for discharge  pain in control  case management arraanging for home wound vac
continue with surgical management, dressing changes, wound vac as per surgery, IV abx as per ID/ceftriaxone, fu recs for change to po abx for discharge  pain in control  case management arraanging for home wound vac
continue with surgical management, dressing changes, wound vac as per surgery, IV abx as per surgery  pain in control
continue with surgical management, dressing changes, wound vac delivered for home management, complete 14 day course IV abx tomorrow and plan for d/c home tomorrow
s/p I and D X 2 ,grew MSSA  On Rocephin-day # 11/14  Wound exam with wound vac change done today -very clean wound with granulation tissue  Cont current wound care  finish antibiotics as planned
s/p I and D X 2 ,grew MSSA  On Rocephin-day # 13/14   wound base clean  Pt planned for d/c home today.Doesnt require additionla antibiotics and cleared to be discharged  F/U with surgery outpt  Cont wound care  wound vac present  Cont current wound care  finish antibiotics as planned
s/p debridement and wound vac placement on friday  Fever resovled, c/s MSSA  Cont Rocephin Day #9 of antibiotics, will do a total of 2 weeks and then decide based on the wound healing
abdomen abscess same . no changes
admitted to surgical service  cont zosyn, add vanco  surgical intervention when medically stable
s/p I and D X 2 ,grew MSSA  On Rocephin-day # 10/14  WOund exam with wound vac change tomorrow
surgical management  IV abx, zosyn/vanco  fu cultures  c/w monitor post-op
s/p debridement and wound vac placement on friday  Fever resovled,c/s MSSA

## 2017-02-03 NOTE — PROGRESS NOTE ADULT - ATTENDING COMMENTS
VAC change Monday.  OK DC IV fluids
pt is better and on examination swelling is better and drainage is going on  and will get cbc and cmp  and inr
I have seen and examined this patient today. Plan as outlined above.
Plan for discharge home tomorrow with wound vac. No medical contraindication to discharge. Reconsult Medicine as needed.
examine the pt and will get bladder scan cmp and bladder scan

## 2017-02-03 NOTE — PROGRESS NOTE ADULT - PROBLEM SELECTOR PLAN 3
Improving  Likely sec to nephrotoixc antibiotics  off Zosyn and vanco
anemia 2/2 to acute blood loss, h/h stable, improving, monitor
anemia 2/2 to acute blood loss, h/h stable, improving, monitor
anemia 2/2 to acute blood loss, h/h stable, monitor
continue with metoprolol
likely antbioitic related,Vanco was discontinued on 25th  On rocephin now
Improving  Likely sec to nephrotoixc antibiotics  off Zosyn and vanco
anemia may be due to blood loss
monitor, relatively low bp at this point, with blood loss and post anesthesia  monitor on tele
pt taking hctz, lisinopril at home  metoprolol 5 mg iv q6hrs while npo
likely antbioitic related,Vanco was discontinued on 25th  NO need of zosyn as c/s have MSSA only  Changed antibiotic to rocephin

## 2017-02-03 NOTE — PROGRESS NOTE ADULT - SUBJECTIVE AND OBJECTIVE BOX
Patient is a 56y old  Male who presents with a chief complaint of Abdominal Abscess (03 Feb 2017 12:28)      INTERVAL HPI / OVERNIGHT EVENTS: denies c/o planned for d/c home today    MEDICATIONS  (STANDING):  influenza   Vaccine 0.5milliLiter(s) IntraMuscular once  chlorhexidine 4% Liquid 1Application(s) Topical daily  metoprolol 50milliGRAM(s) Oral two times a day  senna 2Tablet(s) Oral at bedtime  docusate sodium 100milliGRAM(s) Oral three times a day  bisacodyl 5milliGRAM(s) Oral at bedtime  morphine  - Injectable 4milliGRAM(s) IV Push once  heparin  Injectable 5000Unit(s) SubCutaneous every 12 hours  ondansetron Injectable     ondansetron Injectable 4milliGRAM(s) IV Push every 6 hours  cefTRIAXone   IVPB 1Gram(s) IV Intermittent every 24 hours  amLODIPine   Tablet 10milliGRAM(s) Oral daily    MEDICATIONS  (PRN):  acetaminophen   Tablet 650milliGRAM(s) Oral every 6 hours PRN For Temp greater than 38 C (100.4 F)  ALBUTerol    90 MICROgram(s) HFA Inhaler 2Puff(s) Inhalation every 6 hours PRN Shortness of Breath and/or Wheezing      Vital Signs Last 24 Hrs  Tafebrile    PHYSICAL EXAM:    Constitutional: NAD, well-groomed, well-developed  Respiratory: CTAB/L  Cardiovascular: S1 and S2, RRR, no M/G/R  Gastrointestinal: BS+, soft,s/p wound vac  Extremities: No peripheral edema  Vascular: 2+ peripheral pulses  Skin: as abdo examination      LABS:    03 Feb 2017 11:41    140    |  102    |  25     ----------------------------<  93     3.9     |  31     |  3.01     Ca    8.2        03 Feb 2017 11:41              MICROBIOLOGY:  RECENT CULTURES:        RADIOLOGY & ADDITIONAL STUDIES:

## 2017-02-03 NOTE — DISCHARGE NOTE ADULT - CARE PROVIDERS DIRECT ADDRESSES
[email or web address]

## 2017-02-03 NOTE — PROGRESS NOTE ADULT - SUBJECTIVE AND OBJECTIVE BOX
Dr. Paras Diego contact information [phone]    Post Op Day#:     Subjective:     Procedure:  Wound exploration      Vital Signs Last 24 Hrs  T(C): 37.6, Max: 38.1 (02-02 @ 16:43)  T(F): 99.6, Max: 100.6 (02-02 @ 16:43)  HR: 89 (75 - 89)  BP: 163/84 (153/91 - 163/84)  BP(mean): --  RR: 18 (14 - 18)  SpO2: 99% (97% - 100%)    I&O's Summary    I & Os for current day (as of 03 Feb 2017 09:02)  =============================================  IN: 120 ml / OUT: 1410 ml / NET: -1290 ml      I&O's Detail    I & Os for current day (as of 03 Feb 2017 09:02)  =============================================  IN:    Oral Fluid: 120 ml    Total IN: 120 ml  ---------------------------------------------  OUT:    Voided: 1400 ml    VAC (Vacuum Assisted Closure) System: 10 ml    Total OUT: 1410 ml  ---------------------------------------------  Total NET: -1290 ml                            11.1   6.9   )-----------( 374      ( 02 Feb 2017 06:23 )             30.4       02 Feb 2017 06:23    139    |  101    |  36     ----------------------------<  92     4.0     |  28     |  4.06     Ca    8.4        02 Feb 2017 06:23    Physical Exam:    General:  Appears stated age, well-groomed, well-nourished, no distress    Abdomen:  soft and non-tender - VAC in place    Skin:  No rash  Musculoskeletal:  normal strength  Neuro/Psych:  Alert, oriented tp time, place and person

## 2017-02-03 NOTE — PROGRESS NOTE ADULT - PROBLEM SELECTOR PROBLEM 3
KANE (acute kidney injury)
KANE (acute kidney injury)
Anemia due to blood loss
Essential hypertension
KANE (acute kidney injury)
Anemia due to blood loss
Essential hypertension
Essential hypertension
KANE (acute kidney injury)

## 2017-02-03 NOTE — DISCHARGE NOTE ADULT - HOSPITAL COURSE
Pt presented to ER with abdominal pain and found t have abdominal wall abscess. Pt was taken to OR for I&D abd wall abscess. Pt had postop bleeding and returned to OR for wound exploration. Pt had persistent bleeding, hematology consult was obtained, wound was packed with multiple clot enhancing agents and sand bag was placed for pressure reinforcement. On hospital day #5 bleeding ceased and pt returned to OR for wound debridement and wound vac dressing placement. Pt is stable for discharge today with services for wound vac therapy.

## 2017-02-03 NOTE — PROGRESS NOTE ADULT - PROBLEM SELECTOR PROBLEM 2
MSSA (methicillin-susceptible Staphylococcus aureus) colonization
MSSA (methicillin-susceptible Staphylococcus aureus) colonization
Alcohol dependence with uncomplicated withdrawal
Essential hypertension
MSSA (methicillin-susceptible Staphylococcus aureus) colonization
Alcohol dependence with uncomplicated withdrawal
Essential hypertension
MSSA (methicillin-susceptible Staphylococcus aureus) colonization
Withdrawal symptoms, alcohol, uncomplicated

## 2017-02-03 NOTE — PROGRESS NOTE ADULT - SUBJECTIVE AND OBJECTIVE BOX
Patient seen in follow up for KANE, FEELS WELL.    MEDICATIONS  (STANDING):  influenza   Vaccine 0.5milliLiter(s) IntraMuscular once  chlorhexidine 4% Liquid 1Application(s) Topical daily  metoprolol 50milliGRAM(s) Oral two times a day  senna 2Tablet(s) Oral at bedtime  docusate sodium 100milliGRAM(s) Oral three times a day  bisacodyl 5milliGRAM(s) Oral at bedtime  morphine  - Injectable 4milliGRAM(s) IV Push once  heparin  Injectable 5000Unit(s) SubCutaneous every 12 hours  ondansetron Injectable     ondansetron Injectable 4milliGRAM(s) IV Push every 6 hours  cefTRIAXone   IVPB 1Gram(s) IV Intermittent every 24 hours  amLODIPine   Tablet 5milliGRAM(s) Oral daily    MEDICATIONS  (PRN):  acetaminophen   Tablet 650milliGRAM(s) Oral every 6 hours PRN For Temp greater than 38 C (100.4 F)  morphine  - Injectable 2milliGRAM(s) IV Push every 6 hours PRN Severe Pain (7 - 10)  oxyCODONE  5 mG/acetaminophen 325 mG 2Tablet(s) Oral every 6 hours PRN Moderate Pain (4 - 6)  ALBUTerol    90 MICROgram(s) HFA Inhaler 2Puff(s) Inhalation every 6 hours PRN Shortness of Breath and/or Wheezing    PHYSICAL EXAM:      T(C): 37.7, Max: 38.1 (02-02 @ 16:43)  HR: 87 (75 - 89)  BP: 168/86 (159/89 - 168/86)  RR: 16 (16 - 18)  SpO2: 98% (97% - 100%)  Wt(kg): --  Respiratory: clear anteriorly, decreased BS at bases  Cardiovascular: S1 S2  Gastrointestinal: soft NT ND +BS  wound vac  Extremities:   no  edema                                    11.1   6.9   )-----------( 374      ( 02 Feb 2017 06:23 )             30.4     03 Feb 2017 11:41    140    |  102    |  25     ----------------------------<  93     3.9     |  31     |  3.01     Ca    8.2        03 Feb 2017 11:41            Assessment and Plan:    KANE, feels well, improving.  Avoid NSAIDs and Nephrotoxic agents.

## 2017-02-04 VITALS
TEMPERATURE: 98 F | RESPIRATION RATE: 18 BRPM | OXYGEN SATURATION: 100 % | HEART RATE: 94 BPM | SYSTOLIC BLOOD PRESSURE: 136 MMHG | DIASTOLIC BLOOD PRESSURE: 82 MMHG

## 2017-02-04 RX ADMIN — HEPARIN SODIUM 5000 UNIT(S): 5000 INJECTION INTRAVENOUS; SUBCUTANEOUS at 05:32

## 2017-02-04 RX ADMIN — AMLODIPINE BESYLATE 10 MILLIGRAM(S): 2.5 TABLET ORAL at 05:32

## 2017-02-04 RX ADMIN — Medication 50 MILLIGRAM(S): at 05:29

## 2017-02-04 RX ADMIN — Medication 100 MILLIGRAM(S): at 05:29

## 2017-02-04 NOTE — PROGRESS NOTE ADULT - SUBJECTIVE AND OBJECTIVE BOX
Subjective: Feels well. No complaints. Good PO intake      MEDICATIONS  (STANDING):  influenza   Vaccine 0.5milliLiter(s) IntraMuscular once  chlorhexidine 4% Liquid 1Application(s) Topical daily  metoprolol 50milliGRAM(s) Oral two times a day  senna 2Tablet(s) Oral at bedtime  docusate sodium 100milliGRAM(s) Oral three times a day  bisacodyl 5milliGRAM(s) Oral at bedtime  morphine  - Injectable 4milliGRAM(s) IV Push once  heparin  Injectable 5000Unit(s) SubCutaneous every 12 hours  ondansetron Injectable     ondansetron Injectable 4milliGRAM(s) IV Push every 6 hours  cefTRIAXone   IVPB 1Gram(s) IV Intermittent every 24 hours  amLODIPine   Tablet 10milliGRAM(s) Oral daily    MEDICATIONS  (PRN):  acetaminophen   Tablet 650milliGRAM(s) Oral every 6 hours PRN For Temp greater than 38 C (100.4 F)  ALBUTerol    90 MICROgram(s) HFA Inhaler 2Puff(s) Inhalation every 6 hours PRN Shortness of Breath and/or Wheezing          T(C): 37.7, Max: 37.8 (02-03 @ 17:21)  HR: 74 (74 - 87)  BP: 159/94 (159/94 - 168/86)  RR: 16 (15 - 16)  SpO2: 98% (96% - 98%)  Wt(kg): --        I&O's Detail    I & Os for current day (as of 04 Feb 2017 08:34)  =============================================  IN:    Solution: 50 ml    Total IN: 50 ml  ---------------------------------------------  OUT:    Voided: 2450 ml    Total OUT: 2450 ml  ---------------------------------------------  Total NET: -2400 ml           PHYSICAL EXAM:    GENERAL: comfortable  EYES: EOMI, PERRLA, conjunctiva and sclera clear  NECK: Supple, no inc in JVP  CHEST/LUNG: Clear  HEART: S1S2  ABDOMEN: Soft, Nontender, Nondistended; Bowel sounds present  EXTREMITIES:  no edema  NEURO: no asterixis      LABS:    03 Feb 2017 11:41    140    |  102    |  25     ----------------------------<  93     3.9     |  31     |  3.01     Ca    8.2        03 Feb 2017 11:41              ASSESSMENT and PLAN:    KANE -- steadily improving from peak Cr 7.32. ATN physiology. Cont to trend Cr daily. No change in management from renal POV. May be dced to outpt f/u. Otherwise repeat Cr in 24h

## 2017-02-04 NOTE — PROGRESS NOTE ADULT - PROVIDER SPECIALTY LIST ADULT
Critical Care
Critical Care
Hospitalist
Infectious Disease
Nephrology
Surgery
Infectious Disease

## 2017-02-04 NOTE — PROGRESS NOTE ADULT - SUBJECTIVE AND OBJECTIVE BOX
Dr. Paras Diego contact information [phone]    Subjective: feels fine    Procedure:  Wound exploration      Vital Signs Last 24 Hrs  T(C): 37.7, Max: 37.8 (02-03 @ 17:21)  T(F): 99.8, Max: 100 (02-03 @ 17:21)  HR: 74 (74 - 87)  BP: 159/94 (159/94 - 168/86)  BP(mean): --  RR: 16 (15 - 16)  SpO2: 98% (96% - 98%)    I&O's Summary    I & Os for current day (as of 04 Feb 2017 08:14)  =============================================  IN: 50 ml / OUT: 2450 ml / NET: -2400 ml      I&O's Detail    I & Os for current day (as of 04 Feb 2017 08:14)  =============================================  IN:    Solution: 50 ml    Total IN: 50 ml  ---------------------------------------------  OUT:    Voided: 2450 ml    Total OUT: 2450 ml  ---------------------------------------------  Total NET: -2400 ml          03 Feb 2017 11:41    140    |  102    |  25     ----------------------------<  93     3.9     |  31     |  3.01     Ca    8.2        03 Feb 2017 11:41      Physical Exam:    General:  Appears stated age, well-groomed, well-nourished, no distress  Eyes : ROBERT  HENT:  WNL, no JVD  Chest:  clear breath sounds  Cardiovascular:  Regular rate & rhythm  Abdomen: VAC in place   Extremities:  Gait & station:    Skin:  No rash  Musculoskeletal:  normal strength  Neuro/Psych:  Alert, oriented tp time, place and person
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
## 2017-02-05 ENCOUNTER — INPATIENT (INPATIENT)
Facility: HOSPITAL | Age: 57
LOS: 10 days | Discharge: HOME | End: 2017-02-16
Attending: SURGERY | Admitting: SURGERY
Payer: COMMERCIAL

## 2017-02-05 VITALS
OXYGEN SATURATION: 98 % | HEIGHT: 68 IN | SYSTOLIC BLOOD PRESSURE: 131 MMHG | HEART RATE: 110 BPM | DIASTOLIC BLOOD PRESSURE: 91 MMHG | TEMPERATURE: 100 F | RESPIRATION RATE: 17 BRPM | WEIGHT: 134.92 LBS

## 2017-02-05 DIAGNOSIS — Z98.890 OTHER SPECIFIED POSTPROCEDURAL STATES: Chronic | ICD-10-CM

## 2017-02-05 DIAGNOSIS — S30.1XXA CONTUSION OF ABDOMINAL WALL, INITIAL ENCOUNTER: Chronic | ICD-10-CM

## 2017-02-05 DIAGNOSIS — R50.82 POSTPROCEDURAL FEVER: ICD-10-CM

## 2017-02-05 PROBLEM — I10 ESSENTIAL (PRIMARY) HYPERTENSION: Chronic | Status: ACTIVE | Noted: 2017-01-22

## 2017-02-05 PROBLEM — J45.909 UNSPECIFIED ASTHMA, UNCOMPLICATED: Chronic | Status: ACTIVE | Noted: 2017-01-22

## 2017-02-05 LAB
ALBUMIN SERPL ELPH-MCNC: 3 G/DL — LOW (ref 3.3–5)
ALP SERPL-CCNC: 72 U/L — SIGNIFICANT CHANGE UP (ref 40–120)
ALT FLD-CCNC: 41 U/L — SIGNIFICANT CHANGE UP (ref 12–78)
ANION GAP SERPL CALC-SCNC: 8 MMOL/L — SIGNIFICANT CHANGE UP (ref 5–17)
APTT BLD: 49.6 SEC — HIGH (ref 27.5–37.4)
AST SERPL-CCNC: 55 U/L — HIGH (ref 15–37)
BASOPHILS # BLD AUTO: 0.1 K/UL — SIGNIFICANT CHANGE UP (ref 0–0.2)
BASOPHILS NFR BLD AUTO: 1.1 % — SIGNIFICANT CHANGE UP (ref 0–2)
BILIRUB SERPL-MCNC: 0.5 MG/DL — SIGNIFICANT CHANGE UP (ref 0.2–1.2)
BUN SERPL-MCNC: 18 MG/DL — SIGNIFICANT CHANGE UP (ref 7–23)
CALCIUM SERPL-MCNC: 8.4 MG/DL — LOW (ref 8.5–10.1)
CHLORIDE SERPL-SCNC: 101 MMOL/L — SIGNIFICANT CHANGE UP (ref 96–108)
CO2 SERPL-SCNC: 29 MMOL/L — SIGNIFICANT CHANGE UP (ref 22–31)
CREAT SERPL-MCNC: 2.11 MG/DL — HIGH (ref 0.5–1.3)
EOSINOPHIL # BLD AUTO: 0.2 K/UL — SIGNIFICANT CHANGE UP (ref 0–0.5)
EOSINOPHIL NFR BLD AUTO: 2.6 % — SIGNIFICANT CHANGE UP (ref 0–6)
GLUCOSE SERPL-MCNC: 92 MG/DL — SIGNIFICANT CHANGE UP (ref 70–99)
HCT VFR BLD CALC: 30.4 % — LOW (ref 39–50)
HGB BLD-MCNC: 11 G/DL — LOW (ref 13–17)
INR BLD: 1.24 RATIO — HIGH (ref 0.88–1.16)
LACTATE SERPL-SCNC: 1.2 MMOL/L — SIGNIFICANT CHANGE UP (ref 0.7–2)
LYMPHOCYTES # BLD AUTO: 1.1 K/UL — SIGNIFICANT CHANGE UP (ref 1–3.3)
LYMPHOCYTES # BLD AUTO: 16.1 % — SIGNIFICANT CHANGE UP (ref 13–44)
MCHC RBC-ENTMCNC: 33.4 PG — SIGNIFICANT CHANGE UP (ref 27–34)
MCHC RBC-ENTMCNC: 36.1 GM/DL — HIGH (ref 32–36)
MCV RBC AUTO: 92.7 FL — SIGNIFICANT CHANGE UP (ref 80–100)
MONOCYTES # BLD AUTO: 0.4 K/UL — SIGNIFICANT CHANGE UP (ref 0–0.9)
MONOCYTES NFR BLD AUTO: 6.2 % — SIGNIFICANT CHANGE UP (ref 2–14)
NEUTROPHILS # BLD AUTO: 5.3 K/UL — SIGNIFICANT CHANGE UP (ref 1.8–7.4)
NEUTROPHILS NFR BLD AUTO: 74 % — SIGNIFICANT CHANGE UP (ref 43–77)
PLATELET # BLD AUTO: 396 K/UL — SIGNIFICANT CHANGE UP (ref 150–400)
POTASSIUM SERPL-MCNC: 3.7 MMOL/L — SIGNIFICANT CHANGE UP (ref 3.5–5.3)
POTASSIUM SERPL-SCNC: 3.7 MMOL/L — SIGNIFICANT CHANGE UP (ref 3.5–5.3)
PROT SERPL-MCNC: 8.1 GM/DL — SIGNIFICANT CHANGE UP (ref 6–8.3)
PROTHROM AB SERPL-ACNC: 13.9 SEC — HIGH (ref 10–13.1)
RBC # BLD: 3.28 M/UL — LOW (ref 4.2–5.8)
RBC # FLD: 12.4 % — SIGNIFICANT CHANGE UP (ref 11–15)
SODIUM SERPL-SCNC: 138 MMOL/L — SIGNIFICANT CHANGE UP (ref 135–145)
WBC # BLD: 7.1 K/UL — SIGNIFICANT CHANGE UP (ref 3.8–10.5)
WBC # FLD AUTO: 7.1 K/UL — SIGNIFICANT CHANGE UP (ref 3.8–10.5)

## 2017-02-05 PROCEDURE — 74176 CT ABD & PELVIS W/O CONTRAST: CPT | Mod: 26

## 2017-02-05 PROCEDURE — 99285 EMERGENCY DEPT VISIT HI MDM: CPT

## 2017-02-05 PROCEDURE — 71010: CPT | Mod: 26

## 2017-02-05 RX ORDER — SODIUM CHLORIDE 9 MG/ML
1000 INJECTION INTRAMUSCULAR; INTRAVENOUS; SUBCUTANEOUS ONCE
Qty: 0 | Refills: 0 | Status: COMPLETED | OUTPATIENT
Start: 2017-02-05 | End: 2017-02-05

## 2017-02-05 RX ORDER — ALBUTEROL 90 UG/1
2.5 AEROSOL, METERED ORAL ONCE
Qty: 0 | Refills: 0 | Status: COMPLETED | OUTPATIENT
Start: 2017-02-05 | End: 2017-02-05

## 2017-02-05 RX ORDER — PIPERACILLIN AND TAZOBACTAM 4; .5 G/20ML; G/20ML
3.38 INJECTION, POWDER, LYOPHILIZED, FOR SOLUTION INTRAVENOUS ONCE
Qty: 0 | Refills: 0 | Status: COMPLETED | OUTPATIENT
Start: 2017-02-05 | End: 2017-02-05

## 2017-02-05 RX ORDER — CYCLOBENZAPRINE HYDROCHLORIDE 10 MG/1
5 TABLET, FILM COATED ORAL THREE TIMES A DAY
Qty: 0 | Refills: 0 | Status: DISCONTINUED | OUTPATIENT
Start: 2017-02-05 | End: 2017-02-14

## 2017-02-05 RX ORDER — ONDANSETRON 8 MG/1
4 TABLET, FILM COATED ORAL EVERY 6 HOURS
Qty: 0 | Refills: 0 | Status: DISCONTINUED | OUTPATIENT
Start: 2017-02-05 | End: 2017-02-14

## 2017-02-05 RX ORDER — ACETAMINOPHEN 500 MG
325 TABLET ORAL EVERY 4 HOURS
Qty: 0 | Refills: 0 | Status: COMPLETED | OUTPATIENT
Start: 2017-02-05 | End: 2017-02-05

## 2017-02-05 RX ORDER — CYCLOBENZAPRINE HYDROCHLORIDE 10 MG/1
1 TABLET, FILM COATED ORAL
Qty: 0 | Refills: 0 | COMMUNITY

## 2017-02-05 RX ORDER — HEPARIN SODIUM 5000 [USP'U]/ML
5000 INJECTION INTRAVENOUS; SUBCUTANEOUS EVERY 12 HOURS
Qty: 0 | Refills: 0 | Status: DISCONTINUED | OUTPATIENT
Start: 2017-02-05 | End: 2017-02-14

## 2017-02-05 RX ORDER — SODIUM CHLORIDE 9 MG/ML
1000 INJECTION INTRAMUSCULAR; INTRAVENOUS; SUBCUTANEOUS
Qty: 0 | Refills: 0 | Status: DISCONTINUED | OUTPATIENT
Start: 2017-02-05 | End: 2017-02-10

## 2017-02-05 RX ORDER — ACETAMINOPHEN 500 MG
650 TABLET ORAL EVERY 6 HOURS
Qty: 0 | Refills: 0 | Status: DISCONTINUED | OUTPATIENT
Start: 2017-02-05 | End: 2017-02-14

## 2017-02-05 RX ORDER — IOHEXOL 300 MG/ML
30 INJECTION, SOLUTION INTRAVENOUS ONCE
Qty: 0 | Refills: 0 | Status: COMPLETED | OUTPATIENT
Start: 2017-02-05 | End: 2017-02-05

## 2017-02-05 RX ORDER — LISINOPRIL 2.5 MG/1
20 TABLET ORAL DAILY
Qty: 0 | Refills: 0 | Status: DISCONTINUED | OUTPATIENT
Start: 2017-02-05 | End: 2017-02-14

## 2017-02-05 RX ORDER — VANCOMYCIN HCL 1 G
1000 VIAL (EA) INTRAVENOUS ONCE
Qty: 0 | Refills: 0 | Status: COMPLETED | OUTPATIENT
Start: 2017-02-05 | End: 2017-02-05

## 2017-02-05 RX ORDER — AMPICILLIN SODIUM AND SULBACTAM SODIUM 250; 125 MG/ML; MG/ML
3 INJECTION, POWDER, FOR SUSPENSION INTRAMUSCULAR; INTRAVENOUS EVERY 6 HOURS
Qty: 0 | Refills: 0 | Status: DISCONTINUED | OUTPATIENT
Start: 2017-02-05 | End: 2017-02-14

## 2017-02-05 RX ADMIN — Medication 325 MILLIGRAM(S): at 19:00

## 2017-02-05 RX ADMIN — ALBUTEROL 2.5 MILLIGRAM(S): 90 AEROSOL, METERED ORAL at 18:31

## 2017-02-05 RX ADMIN — IOHEXOL 30 MILLILITER(S): 300 INJECTION, SOLUTION INTRAVENOUS at 12:40

## 2017-02-05 RX ADMIN — Medication 650 MILLIGRAM(S): at 17:10

## 2017-02-05 RX ADMIN — HEPARIN SODIUM 5000 UNIT(S): 5000 INJECTION INTRAVENOUS; SUBCUTANEOUS at 18:53

## 2017-02-05 RX ADMIN — Medication 250 MILLIGRAM(S): at 12:39

## 2017-02-05 RX ADMIN — SODIUM CHLORIDE 75 MILLILITER(S): 9 INJECTION INTRAMUSCULAR; INTRAVENOUS; SUBCUTANEOUS at 18:03

## 2017-02-05 RX ADMIN — AMPICILLIN SODIUM AND SULBACTAM SODIUM 200 GRAM(S): 250; 125 INJECTION, POWDER, FOR SUSPENSION INTRAMUSCULAR; INTRAVENOUS at 18:00

## 2017-02-05 RX ADMIN — SODIUM CHLORIDE 1000 MILLILITER(S): 9 INJECTION INTRAMUSCULAR; INTRAVENOUS; SUBCUTANEOUS at 12:15

## 2017-02-05 RX ADMIN — PIPERACILLIN AND TAZOBACTAM 200 GRAM(S): 4; .5 INJECTION, POWDER, LYOPHILIZED, FOR SOLUTION INTRAVENOUS at 12:39

## 2017-02-05 NOTE — ED PROVIDER NOTE - PHYSICAL EXAMINATION
Gen: Alert, NAD Head: NC, AT, PERRL, EOMI, normal lids/conjunctiva ENT: normal hearing, patent oropharynx without erythema/exudate, uvula midline  neck: +supple, no tenderness/meningismus/JVD, +Trachea midline Pulm: Bilateral BS, normal resp effort, no wheeze/stridor/retractions CV: RRR, no M/R/G, +dist pulses Abd: soft, mildline open wound with exudate, NT/ND, +BS, no hepatosplenomegaly Mskel: no edema/erythema/cyanosis Skin: no rash Neuro: AAOx3, no sensory/motor deficits, CN 2-12 intact
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
## 2017-02-05 NOTE — H&P ADULT. - RADIOLOGY RESULTS AND INTERPRETATION
IMPRESSION: Decreased complex collection in the ventral abdominal wall as   compared to the prior study. Small foci of increased attenuation in the   anterior abdominal wall musculature, suggestive of small bleeds within   the previously described process. Wound dehiscence in the infraumbilical   anterior abdominal wall.

## 2017-02-05 NOTE — H&P ADULT. - PROBLEM SELECTOR PLAN 1
-Admit patient  -Unsyn  -Wet to dry dressings  -IV fluids  -pain management  -DVT ppx  -f/u labs  -will discuss patient with attending

## 2017-02-05 NOTE — ED ADULT NURSE NOTE - OBJECTIVE STATEMENT
pt seen by visiting nurse for abdominal wound check, temp at home 101, sent to ED by pmd. dressing change by visiting nurse.

## 2017-02-05 NOTE — H&P ADULT. - HISTORY OF PRESENT ILLNESS
56 year old male with PMHx of HTN, asthma, etoh abuse who presents to ED today with 101 fever after being discharged yesterday s/p I&D of infected hematoma (1/23/17) with return to OR for hemostasis same day, s/p wound debridement and wound vac placement on 1/27/2017, s/p wound vac exchange on 1/30/17. No abdominal pain and no aggravating or relieving factors. Pt is non-toxic appearing. Patient denies abdominal pain, chills, nausea, vomiting, constipation, diarrhea, hematuria, chest pain, SOB, back pain.

## 2017-02-05 NOTE — ED PROVIDER NOTE - MEDICAL DECISION MAKING DETAILS
Patient presnt with fever and wound.  Lab values do not require emergent intervention. d/w Dr. Diego and will admit.  d/w Surgical PA.  Uneventful ED observation period. NANCIE.  caren portillo

## 2017-02-05 NOTE — H&P ADULT. - ASSESSMENT
56 year old male with PMHx of HTN, asthma, etoh abuse who presents to ED today with fever after being discharged yesterday s/p I&D of infected hematoma (1/23/17) with return to OR for hemostasis same day, s/p wound debridement and wound vac placement on 1/27/2017, s/p wound vac exchange on 1/30/17. Current fever is 101.8F.

## 2017-02-05 NOTE — ED PROVIDER NOTE - OBJECTIVE STATEMENT
Pertinent PMH/PSH/FHx/SHx and Review of Systems contained within:  Patient presents to the ED for fever.  discharged yesterday after intraabd abscess and post op.  today, VNS at house and had fever of 101.  in ED, T 99.9.  Non toxic.  Well appearing. midline abd wound present with exudate.  no abd tenderness.  otherwise baseline.  no N/V.  no rash.  Patient due to get wound vac but unable to do so as of yet.  No aggravating or relieving factors. No other pertinent PMH.  No other pertinent PSH.  No other pertinent FHx.  Patient denies EtOH/tobacco/illicit substance use. No fever/chills, No photophobia/eye pain/changes in vision, No ear pain/sore throat/dysphagia, No chest pain/palpitations, no SOB/cough/wheeze/stridor, No N/V/D, no dysuria/frequency/discharge, No neck/back pain, no rash, no changes in neurological status/function.

## 2017-02-06 DIAGNOSIS — A49.01 METHICILLIN SUSCEPTIBLE STAPHYLOCOCCUS AUREUS INFECTION, UNSPECIFIED SITE: ICD-10-CM

## 2017-02-06 DIAGNOSIS — R50.9 FEVER, UNSPECIFIED: ICD-10-CM

## 2017-02-06 DIAGNOSIS — I15.9 SECONDARY HYPERTENSION, UNSPECIFIED: ICD-10-CM

## 2017-02-06 DIAGNOSIS — K65.1 PERITONEAL ABSCESS: ICD-10-CM

## 2017-02-06 LAB
ALBUMIN SERPL ELPH-MCNC: 2.7 G/DL — LOW (ref 3.3–5)
ALP SERPL-CCNC: 70 U/L — SIGNIFICANT CHANGE UP (ref 40–120)
ALT FLD-CCNC: 29 U/L — SIGNIFICANT CHANGE UP (ref 12–78)
ANION GAP SERPL CALC-SCNC: 9 MMOL/L — SIGNIFICANT CHANGE UP (ref 5–17)
APTT BLD: 51.3 SEC — HIGH (ref 27.5–37.4)
AST SERPL-CCNC: 34 U/L — SIGNIFICANT CHANGE UP (ref 15–37)
BILIRUB DIRECT SERPL-MCNC: 0.17 MG/DL — SIGNIFICANT CHANGE UP (ref 0.05–0.2)
BILIRUB INDIRECT FLD-MCNC: 0.3 MG/DL — SIGNIFICANT CHANGE UP (ref 0.2–1)
BILIRUB SERPL-MCNC: 0.5 MG/DL — SIGNIFICANT CHANGE UP (ref 0.2–1.2)
BUN SERPL-MCNC: 16 MG/DL — SIGNIFICANT CHANGE UP (ref 7–23)
CALCIUM SERPL-MCNC: 8.2 MG/DL — LOW (ref 8.5–10.1)
CHLORIDE SERPL-SCNC: 102 MMOL/L — SIGNIFICANT CHANGE UP (ref 96–108)
CO2 SERPL-SCNC: 27 MMOL/L — SIGNIFICANT CHANGE UP (ref 22–31)
CREAT SERPL-MCNC: 2.05 MG/DL — HIGH (ref 0.5–1.3)
GLUCOSE SERPL-MCNC: 86 MG/DL — SIGNIFICANT CHANGE UP (ref 70–99)
HCT VFR BLD CALC: 28.9 % — LOW (ref 39–50)
HGB BLD-MCNC: 10 G/DL — LOW (ref 13–17)
INR BLD: 1.41 RATIO — HIGH (ref 0.88–1.16)
MCHC RBC-ENTMCNC: 33.4 PG — SIGNIFICANT CHANGE UP (ref 27–34)
MCHC RBC-ENTMCNC: 34.8 GM/DL — SIGNIFICANT CHANGE UP (ref 32–36)
MCV RBC AUTO: 95.9 FL — SIGNIFICANT CHANGE UP (ref 80–100)
PLATELET # BLD AUTO: 292 K/UL — SIGNIFICANT CHANGE UP (ref 150–400)
POTASSIUM SERPL-MCNC: 3.7 MMOL/L — SIGNIFICANT CHANGE UP (ref 3.5–5.3)
POTASSIUM SERPL-SCNC: 3.7 MMOL/L — SIGNIFICANT CHANGE UP (ref 3.5–5.3)
PROT SERPL-MCNC: 7.5 GM/DL — SIGNIFICANT CHANGE UP (ref 6–8.3)
PROTHROM AB SERPL-ACNC: 15.8 SEC — HIGH (ref 10–13.1)
RBC # BLD: 3.01 M/UL — LOW (ref 4.2–5.8)
RBC # FLD: 12.8 % — SIGNIFICANT CHANGE UP (ref 11–15)
SODIUM SERPL-SCNC: 138 MMOL/L — SIGNIFICANT CHANGE UP (ref 135–145)
WBC # BLD: 4.7 K/UL — SIGNIFICANT CHANGE UP (ref 3.8–10.5)
WBC # FLD AUTO: 4.7 K/UL — SIGNIFICANT CHANGE UP (ref 3.8–10.5)

## 2017-02-06 RX ADMIN — SODIUM CHLORIDE 75 MILLILITER(S): 9 INJECTION INTRAMUSCULAR; INTRAVENOUS; SUBCUTANEOUS at 05:29

## 2017-02-06 RX ADMIN — AMPICILLIN SODIUM AND SULBACTAM SODIUM 200 GRAM(S): 250; 125 INJECTION, POWDER, FOR SUSPENSION INTRAMUSCULAR; INTRAVENOUS at 17:39

## 2017-02-06 RX ADMIN — AMPICILLIN SODIUM AND SULBACTAM SODIUM 200 GRAM(S): 250; 125 INJECTION, POWDER, FOR SUSPENSION INTRAMUSCULAR; INTRAVENOUS at 23:49

## 2017-02-06 RX ADMIN — AMPICILLIN SODIUM AND SULBACTAM SODIUM 200 GRAM(S): 250; 125 INJECTION, POWDER, FOR SUSPENSION INTRAMUSCULAR; INTRAVENOUS at 00:04

## 2017-02-06 RX ADMIN — Medication 650 MILLIGRAM(S): at 02:06

## 2017-02-06 RX ADMIN — LISINOPRIL 20 MILLIGRAM(S): 2.5 TABLET ORAL at 00:07

## 2017-02-06 RX ADMIN — Medication 650 MILLIGRAM(S): at 16:40

## 2017-02-06 RX ADMIN — HEPARIN SODIUM 5000 UNIT(S): 5000 INJECTION INTRAVENOUS; SUBCUTANEOUS at 17:39

## 2017-02-06 RX ADMIN — SODIUM CHLORIDE 75 MILLILITER(S): 9 INJECTION INTRAMUSCULAR; INTRAVENOUS; SUBCUTANEOUS at 23:50

## 2017-02-06 RX ADMIN — AMPICILLIN SODIUM AND SULBACTAM SODIUM 200 GRAM(S): 250; 125 INJECTION, POWDER, FOR SUSPENSION INTRAMUSCULAR; INTRAVENOUS at 05:29

## 2017-02-06 RX ADMIN — AMPICILLIN SODIUM AND SULBACTAM SODIUM 200 GRAM(S): 250; 125 INJECTION, POWDER, FOR SUSPENSION INTRAMUSCULAR; INTRAVENOUS at 14:11

## 2017-02-06 NOTE — CONSULT NOTE ADULT - PROBLEM SELECTOR RECOMMENDATION 2
of infected hematoma,s/p two weeks of IV antbiotics  cont Unasyn  f/U on blood culture  Plan for placement of wound vac again

## 2017-02-06 NOTE — PROGRESS NOTE ADULT - SUBJECTIVE AND OBJECTIVE BOX
SURGERY PROGRESS HPI:  Pt seen and examined at bedside. Pt denies complaints and abdominal pain is minimal.  No acute events overnight. Pt states fever improved but still present.   Pt denies nausea and vomiting.  +BM/flatus. Voiding well.  Pt denies chest pain, SOB, dizziness.  Pt is ambulating.      Vital Signs Last 24 Hrs  T(C): 37.9, Max: 39.2 (02-05 @ 18:39)  T(F): 100.2, Max: 102.6 (02-05 @ 18:39)  HR: 81 (81 - 110)  BP: 148/86 (130/83 - 163/96)  BP(mean): --  RR: 18 (17 - 18)  SpO2: 99% (98% - 100%)      PHYSICAL EXAM:    GENERAL: NAD  HEAD:  Atraumatic, Normocephalic  CHEST/LUNG: Clear to ausculation, bilaterally   HEART: RRR S1S2  ABDOMEN: Abdominal dressing clean/dry/intact. Wet to dry dressing clean/dry/intact. Non-distended, +BS, soft, non tender, no guarding  EXTREMITIES:  calf soft, non tender B/L    I&O's Detail    I & Os for current day (as of 06 Feb 2017 09:00)  =============================================  IN:    sodium chloride 0.9%.: 900 ml    IV PiggyBack: 200 ml    Total IN: 1100 ml  ---------------------------------------------  OUT:    Voided: 300 ml    Total OUT: 300 ml  ---------------------------------------------  Total NET: 800 ml      LABS:                        10.0   4.7   )-----------( 292      ( 06 Feb 2017 08:07 )             28.9     06 Feb 2017 08:07    138    |  102    |  16     ----------------------------<  86     3.7     |  27     |  2.05     Ca    8.2        06 Feb 2017 08:07    TPro  7.5    /  Alb  2.7    /  TBili  0.5    /  DBili  .17    /  AST  34     /  ALT  29     /  AlkPhos  70     06 Feb 2017 08:07    PT/INR - ( 06 Feb 2017 08:07 )   PT: 15.8 sec;   INR: 1.41 ratio         PTT - ( 06 Feb 2017 08:07 )  PTT:51.3 sec      RADIOLOGY & ADDITIONAL STUDIES:     CXR 02/05/17: FINDINGS:  No previous examinations are available for review.  The lungs are  clear.  No pleural abnormality is seen.   The heart and mediastinum appear intact. The visualized skeleton is   unremarkable.FINDINGS:  No previous examinations are available for review.  The lungs are  clear.  No pleural abnormality is seen.   The heart and mediastinum appear intact. The visualized skeleton is   unremarkable.    CT 02/05/17: IMPRESSION: Decreased complex collection in the ventral abdominal wall as   compared to the prior study. Small foci of increased attenuation in the   anterior abdominal wall musculature, suggestive of small bleeds within   the previously described process. Wound dehiscence in the infraumbilical   anterior abdominal wall.      Assessment: 56 year old male with PMHx of HTN, asthma, etoh abuse admitted on 02/05/17 with 101 fever after being discharged 02/04/17 s/p I&D of infected hematoma (1/23/17) with return to OR for hemostasis same day, s/p wound debridement and wound vac placement on 1/27/2017, s/p wound vac exchange on 1/30/17. Pt's current temperature is 100.2F and WBC is 4.7. Negative abdominal CT and negative CXR.       Plan:  -Continue Unasyn  -Continue wet to dry abdominal dressings, local wound care  -Tylenol for fever  -IV fluids  -Continue NPO today  -pain management prn  -continue DVT prophylaxis  -OOB, Ambulating  -continue incentive spirometer  -f/u labs  -will discuss pt with surgical attending

## 2017-02-07 DIAGNOSIS — F10.230 ALCOHOL DEPENDENCE WITH WITHDRAWAL, UNCOMPLICATED: ICD-10-CM

## 2017-02-07 DIAGNOSIS — J45.21 MILD INTERMITTENT ASTHMA WITH (ACUTE) EXACERBATION: ICD-10-CM

## 2017-02-07 DIAGNOSIS — B95.61 METHICILLIN SUSCEPTIBLE STAPHYLOCOCCUS AUREUS INFECTION AS THE CAUSE OF DISEASES CLASSIFIED ELSEWHERE: ICD-10-CM

## 2017-02-07 DIAGNOSIS — L76.22 POSTPROCEDURAL HEMORRHAGE OF SKIN AND SUBCUTANEOUS TISSUE FOLLOWING OTHER PROCEDURE: ICD-10-CM
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
## 2017-02-07 DIAGNOSIS — K59.00 CONSTIPATION, UNSPECIFIED: ICD-10-CM

## 2017-02-07 DIAGNOSIS — N17.0 ACUTE KIDNEY FAILURE WITH TUBULAR NECROSIS: ICD-10-CM

## 2017-02-07 DIAGNOSIS — D62 ACUTE POSTHEMORRHAGIC ANEMIA: ICD-10-CM

## 2017-02-07 DIAGNOSIS — M60.08 INFECTIVE MYOSITIS, OTHER SITE: ICD-10-CM

## 2017-02-07 DIAGNOSIS — B37.9 CANDIDIASIS, UNSPECIFIED: ICD-10-CM

## 2017-02-07 DIAGNOSIS — Z79.51 LONG TERM (CURRENT) USE OF INHALED STEROIDS: ICD-10-CM

## 2017-02-07 DIAGNOSIS — T36.8X5A ADVERSE EFFECT OF OTHER SYSTEMIC ANTIBIOTICS, INITIAL ENCOUNTER: ICD-10-CM

## 2017-02-07 DIAGNOSIS — L02.211 CUTANEOUS ABSCESS OF ABDOMINAL WALL: ICD-10-CM

## 2017-02-07 DIAGNOSIS — R79.89 OTHER SPECIFIED ABNORMAL FINDINGS OF BLOOD CHEMISTRY: ICD-10-CM

## 2017-02-07 DIAGNOSIS — F17.210 NICOTINE DEPENDENCE, CIGARETTES, UNCOMPLICATED: ICD-10-CM

## 2017-02-07 DIAGNOSIS — I10 ESSENTIAL (PRIMARY) HYPERTENSION: ICD-10-CM
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
## 2017-02-07 LAB
ALBUMIN SERPL ELPH-MCNC: 2.7 G/DL — LOW (ref 3.3–5)
ALP SERPL-CCNC: 67 U/L — SIGNIFICANT CHANGE UP (ref 40–120)
ALT FLD-CCNC: 32 U/L — SIGNIFICANT CHANGE UP (ref 12–78)
ANION GAP SERPL CALC-SCNC: 11 MMOL/L — SIGNIFICANT CHANGE UP (ref 5–17)
APTT BLD: 48.1 SEC — HIGH (ref 27.5–37.4)
AST SERPL-CCNC: 37 U/L — SIGNIFICANT CHANGE UP (ref 15–37)
BILIRUB DIRECT SERPL-MCNC: 0.13 MG/DL — SIGNIFICANT CHANGE UP (ref 0.05–0.2)
BILIRUB INDIRECT FLD-MCNC: 0.3 MG/DL — SIGNIFICANT CHANGE UP (ref 0.2–1)
BILIRUB SERPL-MCNC: 0.4 MG/DL — SIGNIFICANT CHANGE UP (ref 0.2–1.2)
BUN SERPL-MCNC: 16 MG/DL — SIGNIFICANT CHANGE UP (ref 7–23)
CALCIUM SERPL-MCNC: 7.7 MG/DL — LOW (ref 8.5–10.1)
CHLORIDE SERPL-SCNC: 103 MMOL/L — SIGNIFICANT CHANGE UP (ref 96–108)
CO2 SERPL-SCNC: 26 MMOL/L — SIGNIFICANT CHANGE UP (ref 22–31)
CREAT SERPL-MCNC: 1.63 MG/DL — HIGH (ref 0.5–1.3)
CULTURE RESULTS: NO GROWTH — SIGNIFICANT CHANGE UP
CULTURE RESULTS: SIGNIFICANT CHANGE UP
GLUCOSE SERPL-MCNC: 88 MG/DL — SIGNIFICANT CHANGE UP (ref 70–99)
HCT VFR BLD CALC: 27.2 % — LOW (ref 39–50)
HGB BLD-MCNC: 9.5 G/DL — LOW (ref 13–17)
HIV 1 & 2 AB SERPL IA.RAPID: SIGNIFICANT CHANGE UP
INR BLD: 1.33 RATIO — HIGH (ref 0.88–1.16)
MCHC RBC-ENTMCNC: 32.6 PG — SIGNIFICANT CHANGE UP (ref 27–34)
MCHC RBC-ENTMCNC: 34.8 GM/DL — SIGNIFICANT CHANGE UP (ref 32–36)
MCV RBC AUTO: 93.8 FL — SIGNIFICANT CHANGE UP (ref 80–100)
PLATELET # BLD AUTO: 256 K/UL — SIGNIFICANT CHANGE UP (ref 150–400)
POTASSIUM SERPL-MCNC: 3.7 MMOL/L — SIGNIFICANT CHANGE UP (ref 3.5–5.3)
POTASSIUM SERPL-SCNC: 3.7 MMOL/L — SIGNIFICANT CHANGE UP (ref 3.5–5.3)
PROT SERPL-MCNC: 7.5 GM/DL — SIGNIFICANT CHANGE UP (ref 6–8.3)
PROTHROM AB SERPL-ACNC: 14.9 SEC — HIGH (ref 10–13.1)
RBC # BLD: 2.9 M/UL — LOW (ref 4.2–5.8)
RBC # FLD: 12.2 % — SIGNIFICANT CHANGE UP (ref 11–15)
SODIUM SERPL-SCNC: 140 MMOL/L — SIGNIFICANT CHANGE UP (ref 135–145)
SPECIMEN SOURCE: SIGNIFICANT CHANGE UP
SPECIMEN SOURCE: SIGNIFICANT CHANGE UP
WBC # BLD: 4.4 K/UL — SIGNIFICANT CHANGE UP (ref 3.8–10.5)
WBC # FLD AUTO: 4.4 K/UL — SIGNIFICANT CHANGE UP (ref 3.8–10.5)

## 2017-02-07 RX ORDER — FLUCONAZOLE 150 MG/1
100 TABLET ORAL DAILY
Qty: 0 | Refills: 0 | Status: DISCONTINUED | OUTPATIENT
Start: 2017-02-07 | End: 2017-02-14

## 2017-02-07 RX ADMIN — SODIUM CHLORIDE 75 MILLILITER(S): 9 INJECTION INTRAMUSCULAR; INTRAVENOUS; SUBCUTANEOUS at 11:07

## 2017-02-07 RX ADMIN — LISINOPRIL 20 MILLIGRAM(S): 2.5 TABLET ORAL at 06:15

## 2017-02-07 RX ADMIN — AMPICILLIN SODIUM AND SULBACTAM SODIUM 200 GRAM(S): 250; 125 INJECTION, POWDER, FOR SUSPENSION INTRAMUSCULAR; INTRAVENOUS at 23:58

## 2017-02-07 RX ADMIN — HEPARIN SODIUM 5000 UNIT(S): 5000 INJECTION INTRAVENOUS; SUBCUTANEOUS at 17:39

## 2017-02-07 RX ADMIN — Medication 650 MILLIGRAM(S): at 06:17

## 2017-02-07 RX ADMIN — Medication 650 MILLIGRAM(S): at 17:37

## 2017-02-07 RX ADMIN — SODIUM CHLORIDE 75 MILLILITER(S): 9 INJECTION INTRAMUSCULAR; INTRAVENOUS; SUBCUTANEOUS at 23:58

## 2017-02-07 RX ADMIN — AMPICILLIN SODIUM AND SULBACTAM SODIUM 200 GRAM(S): 250; 125 INJECTION, POWDER, FOR SUSPENSION INTRAMUSCULAR; INTRAVENOUS at 11:07

## 2017-02-07 RX ADMIN — AMPICILLIN SODIUM AND SULBACTAM SODIUM 200 GRAM(S): 250; 125 INJECTION, POWDER, FOR SUSPENSION INTRAMUSCULAR; INTRAVENOUS at 06:14

## 2017-02-07 RX ADMIN — AMPICILLIN SODIUM AND SULBACTAM SODIUM 200 GRAM(S): 250; 125 INJECTION, POWDER, FOR SUSPENSION INTRAMUSCULAR; INTRAVENOUS at 17:34

## 2017-02-07 RX ADMIN — HEPARIN SODIUM 5000 UNIT(S): 5000 INJECTION INTRAVENOUS; SUBCUTANEOUS at 06:15

## 2017-02-07 NOTE — PROGRESS NOTE ADULT - SUBJECTIVE AND OBJECTIVE BOX
INTERVAL HPI/OVERNIGHT EVENTS:    Patient lying comfortably.  Continues to be febrile.  No complaint of abdominal pain.  Tolerating diet with no complaint of nausea/vomiting.  +Flatus.  Voiding well. No complaint of cough, sob, urinary symptom      Vital Signs Last 24 Hrs  T(C): 38, Max: 38.8 (02-06 @ 17:14)  T(F): 100.4, Max: 101.8 (02-06 @ 17:14)  HR: 76 (76 - 94)  BP: 154/90 (130/97 - 154/90)  BP(mean): --  RR: 18 (16 - 18)  SpO2: 98% (98% - 100%)    MEDICATIONS  (STANDING):  ampicillin/sulbactam  IVPB 3Gram(s) IV Intermittent every 6 hours  lisinopril 20milliGRAM(s) Oral daily  sodium chloride 0.9%. 1000milliLiter(s) IV Continuous <Continuous>  heparin  Injectable 5000Unit(s) SubCutaneous every 12 hours    MEDICATIONS  (PRN):  acetaminophen   Tablet 650milliGRAM(s) Oral every 6 hours PRN For Temp greater than 38 C (100.4 F)  cyclobenzaprine 5milliGRAM(s) Oral three times a day PRN Muscle Spasm  ondansetron Injectable 4milliGRAM(s) IV Push every 6 hours PRN Nausea and/or Vomiting      PHYSICAL EXAM:    GENERAL: NAD  HEAD:  Atraumatic, Normocephalic  EYES: EOMI, PERRLA, conjunctiva and sclera clear  CHEST/LUNG: Clear to ausculation, bilaterally   HEART: S1S2  ABDOMEN: wound +granulation tissue, loose eschar, non distended, +BS, soft, non tender, no guarding   EXTREMITIES:  calf soft, non tender     I&O's Detail  I & Os for 24h ending 06 Feb 2017 07:00  =============================================  IN:    sodium chloride 0.9%.: 900 ml    IV PiggyBack: 200 ml    Total IN: 1100 ml  ---------------------------------------------  OUT:    Voided: 300 ml    Total OUT: 300 ml  ---------------------------------------------  Total NET: 800 ml    I & Os for current day (as of 07 Feb 2017 06:52)  =============================================  IN:    Oral Fluid: 360 ml    Total IN: 360 ml  ---------------------------------------------  OUT:    Voided: 500 ml    Total OUT: 500 ml  ---------------------------------------------  Total NET: -140 ml      LABS:                        10.0   4.7   )-----------( 292      ( 06 Feb 2017 08:07 )             28.9     06 Feb 2017 08:07    138    |  102    |  16     ----------------------------<  86     3.7     |  27     |  2.05     Ca    8.2        06 Feb 2017 08:07    TPro  7.5    /  Alb  2.7    /  TBili  0.5    /  DBili  .17    /  AST  34     /  ALT  29     /  AlkPhos  70     06 Feb 2017 08:07    PT/INR - ( 06 Feb 2017 08:07 )   PT: 15.8 sec;   INR: 1.41 ratio         PTT - ( 06 Feb 2017 08:07 )  PTT:51.3 sec    Culture Results:   Few Yeast like cells (02-05 @ 16:48)  Culture Results:   No growth to date. (02-05 @ 14:42)  Culture Results:   No growth to date. (02-05 @ 14:42)      Impression:    56M with HTN, asthma hx of abdominal wall infected hematoma s/p I and D with MSSA infection s/p 2 weeks of Abx tx now with persistent fever     Plan:  Wound Care   Antibiotic per ID.  Follow up culture.  etio??  continue medical management/suportive care   prophylactic measure:  IS, venodynes, dvt/gi ppx, oob to ambulate   PT    will discuss with Surgical attending for further eval.

## 2017-02-07 NOTE — PHYSICAL THERAPY INITIAL EVALUATION ADULT - SKIN INTEGRITY
wound/P.T. wound care initial evaluation performed with all wounds documented in flowsheet 2 4.0 A&I

## 2017-02-07 NOTE — PHYSICAL THERAPY INITIAL EVALUATION ADULT - ADDITIONAL COMMENTS
As per patient lives c wife in private house without stairs to negotiate. Work involves heavy lifting (lumber otto). Independent in all ADLs and gait without devices.

## 2017-02-07 NOTE — PHYSICAL THERAPY INITIAL EVALUATION ADULT - IMPAIRMENTS FOUND, PT EVAL
joint integrity and mobility/posture/poor safety awareness/aerobic capacity/endurance/muscle strength/decreased midline orientation/gait, locomotion, and balance/neuromotor development and sensory integration/ergonomics and body mechanics/circulation/ROM/integumentary integrity

## 2017-02-07 NOTE — PHYSICAL THERAPY INITIAL EVALUATION ADULT - RANGE OF MOTION EXAMINATION, REHAB EVAL
bilateral upper extremity ROM was WFL (within functional limits)/bilateral lower extremity was ROM was WNL (within normal limits)

## 2017-02-07 NOTE — PHYSICAL THERAPY INITIAL EVALUATION ADULT - CRITERIA FOR SKILLED THERAPEUTIC INTERVENTIONS
impairments found/therapy frequency/rehab potential/functional limitations in following categories/risk reduction/prevention/predicted duration of therapy intervention/anticipated discharge recommendation

## 2017-02-07 NOTE — PROGRESS NOTE ADULT - ASSESSMENT
continues with low grad temp - WBC, chest xray and UC neg - does not need a VAC but would consider a skin graft

## 2017-02-07 NOTE — PHYSICAL THERAPY INITIAL EVALUATION ADULT - MODIFIED CLINICAL TEST OF SENSORY INTEGRATION IN BALANCE TEST
Barthel Index: Feeding Score _10__, Bathing Score _5__, Grooming Score _5__, Dressing Score _5__, Bowels Score _10__, Bladder Score _10__, Toilet Score _10__, Transfers Score _10__, Mobility Score _10__, Stairs Score _0__,     Total Score _65__

## 2017-02-07 NOTE — PROGRESS NOTE ADULT - SUBJECTIVE AND OBJECTIVE BOX
Patient is a 56y old  Male who presents with a chief complaint of Fever 101 degrees F (05 Feb 2017 16:46)      INTERVAL HPI / OVERNIGHT EVENTS: fever +    MEDICATIONS  (STANDING):  ampicillin/sulbactam  IVPB 3Gram(s) IV Intermittent every 6 hours  lisinopril 20milliGRAM(s) Oral daily  sodium chloride 0.9%. 1000milliLiter(s) IV Continuous <Continuous>  heparin  Injectable 5000Unit(s) SubCutaneous every 12 hours  hydrochlorothiazide   Tablet 25milliGRAM(s) Oral daily    MEDICATIONS  (PRN):  acetaminophen   Tablet 650milliGRAM(s) Oral every 6 hours PRN For Temp greater than 38 C (100.4 F)  cyclobenzaprine 5milliGRAM(s) Oral three times a day PRN Muscle Spasm  ondansetron Injectable 4milliGRAM(s) IV Push every 6 hours PRN Nausea and/or Vomiting      Vital Signs Last 24 Hrs  T(C): 37.9, Max: 38 (02-07 @ 05:11)  T(F): 100.2, Max: 100.4 (02-07 @ 05:11)  HR: 87 (76 - 92)  BP: 147/95 (129/85 - 155/103)  BP(mean): --  RR: 18 (16 - 18)  SpO2: 100% (98% - 100%)    PHYSICAL EXAM:    Constitutional: NAD, well-groomed, well-developed  Respiratory: CTAB/L  Cardiovascular: S1 and S2, RRR, no M/G/R  Gastrointestinal: BS+, soft, NT/ND, abdominal wall wound +  Extremities: No peripheral edema  Vascular: 2+ peripheral pulses  Skin: No rashes      LABS:                        9.5    4.4   )-----------( 256      ( 07 Feb 2017 07:35 )             27.2     07 Feb 2017 07:35    140    |  103    |  16     ----------------------------<  88     3.7     |  26     |  1.63     Ca    7.7        07 Feb 2017 07:35    TPro  7.5    /  Alb  2.7    /  TBili  0.4    /  DBili  .13    /  AST  37     /  ALT  32     /  AlkPhos  67     07 Feb 2017 07:35    PT/INR - ( 07 Feb 2017 07:35 )   PT: 14.9 sec;   INR: 1.33 ratio         PTT - ( 07 Feb 2017 07:35 )  PTT:48.1 sec        MICROBIOLOGY:  RECENT CULTURES:  02-06 .Urine Clean Catch (Midstream) XXXX XXXX   No growth    02-05 Skin abdominal wound XXXX XXXX   Few Yeast like cells    02-05 .Blood Blood XXXX XXXX   No growth to date.          RADIOLOGY & ADDITIONAL STUDIES:

## 2017-02-07 NOTE — PHYSICAL THERAPY INITIAL EVALUATION ADULT - PERTINENT HX OF CURRENT PROBLEM, REHAB EVAL
Patient admitted due to ongoing infection of abdominal dehisced wound due to an infected hematoma. Chart reviewed and noted Ct Abdo: less collection in the anterior abdominal wall.

## 2017-02-07 NOTE — PHYSICAL THERAPY INITIAL EVALUATION ADULT - GENERAL OBSERVATIONS, REHAB EVAL
Patient encountered supine in bed,vital signs as charted, abdominal wound clean, dressed and intact.PT wound care initial evaluation performed with all wounds documented in flowsheet 2 4.0 A&I. AAOx4.Denies pain or shortness of breath. Chest Auscultation: clear air entry throughout. Palpation: no tactile fremitus. Cough: strong, non-productive or painful to abdominal area.

## 2017-02-07 NOTE — PROGRESS NOTE ADULT - SUBJECTIVE AND OBJECTIVE BOX
Pt seen and examined at bedside resting comfortably.   Abdominal wound dressing removed. Moist granulation tissue with no gross purulent or bloody discharge noted. Irrigated with NS. Silver nitrate applied. Xeroform placed. External dressing with gauze and tape. Pt tolerated well.     Discussed with Dr. Diego. No wound vac needed and will continue to do daily wound care.

## 2017-02-07 NOTE — PHYSICAL THERAPY INITIAL EVALUATION ADULT - PLANNED THERAPY INTERVENTIONS, PT EVAL
stretching/strengthening/manual therapy techniques/postural re-education/neuromuscular re-education/lumbar stabilization/swiss ball techniques

## 2017-02-07 NOTE — PROGRESS NOTE ADULT - SUBJECTIVE AND OBJECTIVE BOX
Dr. Paras Diego contact information [phone]      Subjective: feels fine    Vital Signs Last 24 Hrs  T(C): 38, Max: 38.8 (02-06 @ 17:14)  T(F): 100.4, Max: 101.8 (02-06 @ 17:14)  HR: 76 (76 - 94)  BP: 154/90 (130/97 - 154/90)  BP(mean): --  RR: 18 (16 - 18)  SpO2: 98% (98% - 100%)    I&O's Summary    I & Os for current day (as of 07 Feb 2017 08:40)  =============================================  IN: 360 ml / OUT: 500 ml / NET: -140 ml      I&O's Detail    I & Os for current day (as of 07 Feb 2017 08:40)  =============================================  IN:    Oral Fluid: 360 ml    Total IN: 360 ml  ---------------------------------------------  OUT:    Voided: 500 ml    Total OUT: 500 ml  ---------------------------------------------  Total NET: -140 ml                            9.5    4.4   )-----------( 256      ( 07 Feb 2017 07:35 )             27.2       07 Feb 2017 07:35    140    |  103    |  16     ----------------------------<  88     3.7     |  26     |  1.63     Ca    7.7        07 Feb 2017 07:35    TPro  7.5    /  Alb  2.7    /  TBili  0.4    /  DBili  .13    /  AST  37     /  ALT  32     /  AlkPhos  67     07 Feb 2017 07:35      INR: INR: 1.33 ratio (02-07 @ 07:35)      Radiology:    Physical Exam:    General:  Appears stated age, well-groomed, well-nourished, no distress    Abdomen:  the open wound is flat with excess granulation tissue

## 2017-02-08 LAB
ANION GAP SERPL CALC-SCNC: 8 MMOL/L — SIGNIFICANT CHANGE UP (ref 5–17)
BUN SERPL-MCNC: 15 MG/DL — SIGNIFICANT CHANGE UP (ref 7–23)
CALCIUM SERPL-MCNC: 8.1 MG/DL — LOW (ref 8.5–10.1)
CHLORIDE SERPL-SCNC: 104 MMOL/L — SIGNIFICANT CHANGE UP (ref 96–108)
CO2 SERPL-SCNC: 27 MMOL/L — SIGNIFICANT CHANGE UP (ref 22–31)
CREAT SERPL-MCNC: 1.44 MG/DL — HIGH (ref 0.5–1.3)
GLUCOSE SERPL-MCNC: 87 MG/DL — SIGNIFICANT CHANGE UP (ref 70–99)
GRAM STN FLD: SIGNIFICANT CHANGE UP
HCT VFR BLD CALC: 30.4 % — LOW (ref 39–50)
HGB BLD-MCNC: 10.8 G/DL — LOW (ref 13–17)
MCHC RBC-ENTMCNC: 33.9 PG — SIGNIFICANT CHANGE UP (ref 27–34)
MCHC RBC-ENTMCNC: 35.3 GM/DL — SIGNIFICANT CHANGE UP (ref 32–36)
MCV RBC AUTO: 95.9 FL — SIGNIFICANT CHANGE UP (ref 80–100)
PLATELET # BLD AUTO: 215 K/UL — SIGNIFICANT CHANGE UP (ref 150–400)
POTASSIUM SERPL-MCNC: 3.6 MMOL/L — SIGNIFICANT CHANGE UP (ref 3.5–5.3)
POTASSIUM SERPL-SCNC: 3.6 MMOL/L — SIGNIFICANT CHANGE UP (ref 3.5–5.3)
RBC # BLD: 3.17 M/UL — LOW (ref 4.2–5.8)
RBC # FLD: 12.8 % — SIGNIFICANT CHANGE UP (ref 11–15)
SODIUM SERPL-SCNC: 139 MMOL/L — SIGNIFICANT CHANGE UP (ref 135–145)
SPECIMEN SOURCE: SIGNIFICANT CHANGE UP
WBC # BLD: 3.7 K/UL — LOW (ref 3.8–10.5)
WBC # FLD AUTO: 3.7 K/UL — LOW (ref 3.8–10.5)

## 2017-02-08 PROCEDURE — 76700 US EXAM ABDOM COMPLETE: CPT | Mod: 26,59

## 2017-02-08 PROCEDURE — 10160 PNXR ASPIR ABSC HMTMA BULLA: CPT | Mod: 26

## 2017-02-08 PROCEDURE — 10022: CPT

## 2017-02-08 PROCEDURE — 76942 ECHO GUIDE FOR BIOPSY: CPT | Mod: 26

## 2017-02-08 RX ADMIN — LISINOPRIL 20 MILLIGRAM(S): 2.5 TABLET ORAL at 05:19

## 2017-02-08 RX ADMIN — SODIUM CHLORIDE 75 MILLILITER(S): 9 INJECTION INTRAMUSCULAR; INTRAVENOUS; SUBCUTANEOUS at 22:14

## 2017-02-08 RX ADMIN — AMPICILLIN SODIUM AND SULBACTAM SODIUM 200 GRAM(S): 250; 125 INJECTION, POWDER, FOR SUSPENSION INTRAMUSCULAR; INTRAVENOUS at 05:19

## 2017-02-08 RX ADMIN — HEPARIN SODIUM 5000 UNIT(S): 5000 INJECTION INTRAVENOUS; SUBCUTANEOUS at 17:02

## 2017-02-08 RX ADMIN — AMPICILLIN SODIUM AND SULBACTAM SODIUM 200 GRAM(S): 250; 125 INJECTION, POWDER, FOR SUSPENSION INTRAMUSCULAR; INTRAVENOUS at 17:02

## 2017-02-08 RX ADMIN — FLUCONAZOLE 100 MILLIGRAM(S): 150 TABLET ORAL at 11:28

## 2017-02-08 RX ADMIN — HEPARIN SODIUM 5000 UNIT(S): 5000 INJECTION INTRAVENOUS; SUBCUTANEOUS at 05:19

## 2017-02-08 RX ADMIN — AMPICILLIN SODIUM AND SULBACTAM SODIUM 200 GRAM(S): 250; 125 INJECTION, POWDER, FOR SUSPENSION INTRAMUSCULAR; INTRAVENOUS at 11:27

## 2017-02-08 RX ADMIN — AMPICILLIN SODIUM AND SULBACTAM SODIUM 200 GRAM(S): 250; 125 INJECTION, POWDER, FOR SUSPENSION INTRAMUSCULAR; INTRAVENOUS at 23:23

## 2017-02-08 NOTE — PROCEDURE NOTE - ADDITIONAL PROCEDURE DETAILS
pt s/p left sided abdominal wall hematoma aspirated.  Approx 3 mL's of old, thick blackish blood removed.  Pt tolerated procedure well.  VSS  Fluid sent to lab  -f/u gs/cx results

## 2017-02-08 NOTE — PROGRESS NOTE ADULT - SUBJECTIVE AND OBJECTIVE BOX
Dr. Paras Diego contact information [phone]      Subjective: feels fine    Vital Signs Last 24 Hrs  T(C): 37.6, Max: 37.9 (02-07 @ 17:03)  T(F): 99.6, Max: 100.2 (02-07 @ 17:03)  HR: 79 (79 - 92)  BP: 138/84 (129/85 - 155/103)  BP(mean): --  RR: 16 (16 - 18)  SpO2: 100% (99% - 100%)    I&O's Summary  I & Os for 24h ending 08 Feb 2017 07:00  =============================================  IN: 1260 ml / OUT: 800 ml / NET: 460 ml    I & Os for current day (as of 08 Feb 2017 10:51)  =============================================  IN: 250 ml / OUT: 0 ml / NET: 250 ml      I&O's Detail  I & Os for 24h ending 08 Feb 2017 07:00  =============================================  IN:    sodium chloride 0.9%.: 700 ml    Oral Fluid: 360 ml    IV PiggyBack: 200 ml    Total IN: 1260 ml  ---------------------------------------------  OUT:    Voided: 800 ml    Total OUT: 800 ml  ---------------------------------------------  Total NET: 460 ml    I & Os for current day (as of 08 Feb 2017 10:51)  =============================================  IN:    Oral Fluid: 250 ml    Total IN: 250 ml  ---------------------------------------------  OUT:    Total OUT: 0 ml  ---------------------------------------------  Total NET: 250 ml                            10.8   3.7   )-----------( 215      ( 08 Feb 2017 07:19 )             30.4       08 Feb 2017 07:19    139    |  104    |  15     ----------------------------<  87     3.6     |  27     |  1.44     Ca    8.1        08 Feb 2017 07:19    TPro  7.5    /  Alb  2.7    /  TBili  0.4    /  DBili  .13    /  AST  37     /  ALT  32     /  AlkPhos  67     07 Feb 2017 07:35        Physical Exam:    General:  Appears stated age, well-groomed, well-nourished, no distress    Abdomen:  benign - wound is clean and granulating

## 2017-02-08 NOTE — PROGRESS NOTE ADULT - SUBJECTIVE AND OBJECTIVE BOX
Pt seen and examined at bedside with no complaints.   Denies N/V. Tolerating diet.   +flatus, +BM. Voiding well.  Pt denies abdominal pain, fever, chills, chest pain or SOB     Vital Signs Last 24 Hrs  T(C): 37.6, Max: 37.9 (02-07 @ 17:03)  T(F): 99.6, Max: 100.2 (02-07 @ 17:03)  HR: 79 (79 - 92)  BP: 138/84 (129/85 - 155/103)  BP(mean): --  RR: 16 (16 - 18)  SpO2: 100% (99% - 100%)      PHYSICAL EXAM:  GENERAL: NAD  CHEST/LUNG: Clear to ausculation, bilaterally   HEART: RRR S1S2  ABDOMEN: Non distended, +BS, soft, non tender. Abdominal wound dressing removed. Moist granulation tissue with no gross purulent or bloody discharge noted. Irrigated with NS. Xeroform placed. External dressing with gauze and tape. Pt tolerated well.   EXTREMITIES: No LE edema. No calf tenderness     I&O's Detail  I & Os for 24h ending 08 Feb 2017 07:00  =============================================  IN:    sodium chloride 0.9%.: 700 ml    Oral Fluid: 360 ml    IV PiggyBack: 200 ml    Total IN: 1260 ml  ---------------------------------------------  OUT:    Voided: 800 ml    Total OUT: 800 ml  ---------------------------------------------  Total NET: 460 ml    I & Os for current day (as of 08 Feb 2017 09:09)  =============================================  IN:    Oral Fluid: 250 ml    Total IN: 250 ml  ---------------------------------------------  OUT:    Total OUT: 0 ml  ---------------------------------------------  Total NET: 250 ml      LABS:                        10.8   3.7   )-----------( 215      ( 08 Feb 2017 07:19 )             30.4     08 Feb 2017 07:19    139    |  104    |  15     ----------------------------<  87     3.6     |  27     |  1.44     Ca    8.1        08 Feb 2017 07:19    TPro  7.5    /  Alb  2.7    /  TBili  0.4    /  DBili  .13    /  AST  37     /  ALT  32     /  AlkPhos  67     07 Feb 2017 07:35    PT/INR - ( 07 Feb 2017 07:35 )   PT: 14.9 sec;   INR: 1.33 ratio         PTT - ( 07 Feb 2017 07:35 )  PTT:48.1 sec    Culture Results:   No growth (02-06 @ 09:08)  Culture Results:   Few Yeast like cells (02-05 @ 16:48)  Culture Results:   No growth to date. (02-05 @ 14:42)  Culture Results:   No growth to date. (02-05 @ 14:42)    56M with HTN, asthma hx of abdominal wall infected hematoma s/p I and D with MSSA infection s/p 2 weeks of Abx tx admitted with persistent fever    Plan:  -daily wound care   -continue antibiotics per ID  -f/u labs   -continue DVT prophylaxis with heparin and venodynes   -OOB, Ambulating  -will discuss pt with surgical attending    DONALDO Boland Pt seen and examined at bedside with no complaints.   Denies N/V. Tolerating diet.   +flatus, +BM. Voiding well.  Pt denies abdominal pain, fever, chills, chest pain or SOB     Vital Signs Last 24 Hrs  T(C): 37.6, Max: 37.9 (02-07 @ 17:03)  T(F): 99.6, Max: 100.2 (02-07 @ 17:03)  HR: 79 (79 - 92)  BP: 138/84 (129/85 - 155/103)  BP(mean): --  RR: 16 (16 - 18)  SpO2: 100% (99% - 100%)      PHYSICAL EXAM:  GENERAL: NAD  CHEST/LUNG: Clear to ausculation, bilaterally   HEART: RRR S1S2  ABDOMEN: Non distended, +BS, soft, non tender. Abdominal wound dressing removed. Moist granulation tissue with no gross purulent or bloody discharge noted. Irrigated with NS. Xeroform placed. External dressing with gauze and tape. Pt tolerated well.   EXTREMITIES: No LE edema. No calf tenderness     I&O's Detail  I & Os for 24h ending 08 Feb 2017 07:00  =============================================  IN:    sodium chloride 0.9%.: 700 ml    Oral Fluid: 360 ml    IV PiggyBack: 200 ml    Total IN: 1260 ml  ---------------------------------------------  OUT:    Voided: 800 ml    Total OUT: 800 ml  ---------------------------------------------  Total NET: 460 ml    I & Os for current day (as of 08 Feb 2017 09:09)  =============================================  IN:    Oral Fluid: 250 ml    Total IN: 250 ml  ---------------------------------------------  OUT:    Total OUT: 0 ml  ---------------------------------------------  Total NET: 250 ml      LABS:                        10.8   3.7   )-----------( 215      ( 08 Feb 2017 07:19 )             30.4     08 Feb 2017 07:19    139    |  104    |  15     ----------------------------<  87     3.6     |  27     |  1.44     Ca    8.1        08 Feb 2017 07:19    TPro  7.5    /  Alb  2.7    /  TBili  0.4    /  DBili  .13    /  AST  37     /  ALT  32     /  AlkPhos  67     07 Feb 2017 07:35    PT/INR - ( 07 Feb 2017 07:35 )   PT: 14.9 sec;   INR: 1.33 ratio         PTT - ( 07 Feb 2017 07:35 )  PTT:48.1 sec    Culture Results:   No growth (02-06 @ 09:08)  Culture Results:   Few Yeast like cells (02-05 @ 16:48)  Culture Results:   No growth to date. (02-05 @ 14:42)  Culture Results:   No growth to date. (02-05 @ 14:42)    56M with HTN, asthma hx of abdominal wall infected hematoma s/p I and D with MSSA infection s/p 2 weeks of Abx tx admitted with persistent low grade fever. Left abdominal wall hyperdense hematoma collection on CT.    Plan:  -Ultrasound and possible IR needle aspiration   -daily wound care   -continue antibiotics per ID  -f/u labs   -continue DVT prophylaxis with heparin and venodynes   -OOB, Ambulating  -will discuss pt with surgical attending    DONALDO Boland

## 2017-02-08 NOTE — PROGRESS NOTE ADULT - ASSESSMENT
still with low grad temp - CT showed a ? high density area in the left rectus - will get a sonogram of the are and possible needle area for cultures - ID says stop ABX

## 2017-02-09 LAB
ALBUMIN SERPL ELPH-MCNC: 3 G/DL — LOW (ref 3.3–5)
ALP SERPL-CCNC: 65 U/L — SIGNIFICANT CHANGE UP (ref 40–120)
ALT FLD-CCNC: 33 U/L — SIGNIFICANT CHANGE UP (ref 12–78)
ANION GAP SERPL CALC-SCNC: 11 MMOL/L — SIGNIFICANT CHANGE UP (ref 5–17)
APTT BLD: 47.7 SEC — HIGH (ref 27.5–37.4)
AST SERPL-CCNC: 32 U/L — SIGNIFICANT CHANGE UP (ref 15–37)
BILIRUB DIRECT SERPL-MCNC: 0.16 MG/DL — SIGNIFICANT CHANGE UP (ref 0.05–0.2)
BILIRUB INDIRECT FLD-MCNC: 0.3 MG/DL — SIGNIFICANT CHANGE UP (ref 0.2–1)
BILIRUB SERPL-MCNC: 0.5 MG/DL — SIGNIFICANT CHANGE UP (ref 0.2–1.2)
BUN SERPL-MCNC: 16 MG/DL — SIGNIFICANT CHANGE UP (ref 7–23)
CALCIUM SERPL-MCNC: 8 MG/DL — LOW (ref 8.5–10.1)
CHLORIDE SERPL-SCNC: 101 MMOL/L — SIGNIFICANT CHANGE UP (ref 96–108)
CO2 SERPL-SCNC: 27 MMOL/L — SIGNIFICANT CHANGE UP (ref 22–31)
CREAT SERPL-MCNC: 1.47 MG/DL — HIGH (ref 0.5–1.3)
GLUCOSE SERPL-MCNC: 100 MG/DL — HIGH (ref 70–99)
HCT VFR BLD CALC: 28 % — LOW (ref 39–50)
HGB BLD-MCNC: 9.6 G/DL — LOW (ref 13–17)
INR BLD: 1.23 RATIO — HIGH (ref 0.88–1.16)
MCHC RBC-ENTMCNC: 32 PG — SIGNIFICANT CHANGE UP (ref 27–34)
MCHC RBC-ENTMCNC: 34.5 GM/DL — SIGNIFICANT CHANGE UP (ref 32–36)
MCV RBC AUTO: 92.7 FL — SIGNIFICANT CHANGE UP (ref 80–100)
PLATELET # BLD AUTO: 223 K/UL — SIGNIFICANT CHANGE UP (ref 150–400)
POTASSIUM SERPL-MCNC: 3.4 MMOL/L — LOW (ref 3.5–5.3)
POTASSIUM SERPL-SCNC: 3.4 MMOL/L — LOW (ref 3.5–5.3)
PROT SERPL-MCNC: 7.8 GM/DL — SIGNIFICANT CHANGE UP (ref 6–8.3)
PROTHROM AB SERPL-ACNC: 13.8 SEC — HIGH (ref 10–13.1)
RBC # BLD: 3.02 M/UL — LOW (ref 4.2–5.8)
RBC # FLD: 11.9 % — SIGNIFICANT CHANGE UP (ref 11–15)
SODIUM SERPL-SCNC: 139 MMOL/L — SIGNIFICANT CHANGE UP (ref 135–145)
WBC # BLD: 3.6 K/UL — LOW (ref 3.8–10.5)
WBC # FLD AUTO: 3.6 K/UL — LOW (ref 3.8–10.5)

## 2017-02-09 RX ORDER — COLCHICINE 0.6 MG
1.2 TABLET ORAL ONCE
Qty: 0 | Refills: 0 | Status: COMPLETED | OUTPATIENT
Start: 2017-02-09 | End: 2017-02-09

## 2017-02-09 RX ORDER — POTASSIUM CHLORIDE 20 MEQ
40 PACKET (EA) ORAL EVERY 4 HOURS
Qty: 0 | Refills: 0 | Status: COMPLETED | OUTPATIENT
Start: 2017-02-09 | End: 2017-02-09

## 2017-02-09 RX ORDER — COLCHICINE 0.6 MG
0.6 TABLET ORAL DAILY
Qty: 0 | Refills: 0 | Status: DISCONTINUED | OUTPATIENT
Start: 2017-02-09 | End: 2017-02-14

## 2017-02-09 RX ORDER — LABETALOL HCL 100 MG
10 TABLET ORAL ONCE
Qty: 0 | Refills: 0 | Status: COMPLETED | OUTPATIENT
Start: 2017-02-09 | End: 2017-02-09

## 2017-02-09 RX ADMIN — Medication 1.2 MILLIGRAM(S): at 18:24

## 2017-02-09 RX ADMIN — AMPICILLIN SODIUM AND SULBACTAM SODIUM 200 GRAM(S): 250; 125 INJECTION, POWDER, FOR SUSPENSION INTRAMUSCULAR; INTRAVENOUS at 23:34

## 2017-02-09 RX ADMIN — HEPARIN SODIUM 5000 UNIT(S): 5000 INJECTION INTRAVENOUS; SUBCUTANEOUS at 18:25

## 2017-02-09 RX ADMIN — LISINOPRIL 20 MILLIGRAM(S): 2.5 TABLET ORAL at 05:33

## 2017-02-09 RX ADMIN — CYCLOBENZAPRINE HYDROCHLORIDE 5 MILLIGRAM(S): 10 TABLET, FILM COATED ORAL at 12:56

## 2017-02-09 RX ADMIN — Medication 40 MILLIEQUIVALENT(S): at 18:24

## 2017-02-09 RX ADMIN — Medication 10 MILLIGRAM(S): at 23:37

## 2017-02-09 RX ADMIN — HEPARIN SODIUM 5000 UNIT(S): 5000 INJECTION INTRAVENOUS; SUBCUTANEOUS at 05:34

## 2017-02-09 RX ADMIN — FLUCONAZOLE 100 MILLIGRAM(S): 150 TABLET ORAL at 11:02

## 2017-02-09 RX ADMIN — Medication 650 MILLIGRAM(S): at 23:35

## 2017-02-09 RX ADMIN — AMPICILLIN SODIUM AND SULBACTAM SODIUM 200 GRAM(S): 250; 125 INJECTION, POWDER, FOR SUSPENSION INTRAMUSCULAR; INTRAVENOUS at 11:02

## 2017-02-09 RX ADMIN — AMPICILLIN SODIUM AND SULBACTAM SODIUM 200 GRAM(S): 250; 125 INJECTION, POWDER, FOR SUSPENSION INTRAMUSCULAR; INTRAVENOUS at 05:32

## 2017-02-09 RX ADMIN — Medication 40 MILLIEQUIVALENT(S): at 14:05

## 2017-02-09 RX ADMIN — AMPICILLIN SODIUM AND SULBACTAM SODIUM 200 GRAM(S): 250; 125 INJECTION, POWDER, FOR SUSPENSION INTRAMUSCULAR; INTRAVENOUS at 18:25

## 2017-02-09 NOTE — PROGRESS NOTE ADULT - SUBJECTIVE AND OBJECTIVE BOX
Surgical Attending Fan Tee MD  [phone]    Hosp Day #: 4 admitted with fever and leukocytosis after discharge with VAC dressing. Has small left rectus hematoma aspirated yesterday for C&S. Currently off all AB Rx. Wounds has very irregular surface and needs cleaning up. VAC to be reap[plied.        Vital Signs Last 24 Hrs  T(C): 37.8, Max: 37.8 (02-09 @ 05:17)  T(F): 100, Max: 100 (02-09 @ 05:17)  HR: 72 (72 - 98)  BP: 157/96 (110/60 - 157/96)  BP(mean): --  RR: 16 (16 - 18)  SpO2: 100% (98% - 100%)    I&O's Detail    I & Os for current day (as of 09 Feb 2017 08:38)  =============================================  IN:    sodium chloride 0.9%.: 900 ml    Oral Fluid: 620 ml    Solution: 200 ml    Total IN: 1720 ml  ---------------------------------------------  OUT:    Voided: 1550 ml    Total OUT: 1550 ml  ---------------------------------------------  Total NET: 170 ml                            9.6    3.6   )-----------( 223      ( 09 Feb 2017 07:41 )             28.0       08 Feb 2017 07:19    139    |  104    |  15     ----------------------------<  87     3.6     |  27     |  1.44     Ca    8.1        08 Feb 2017 07:19        PT/INR - ( 09 Feb 2017 07:41 )   PT: 13.8 sec;   INR: 1.23 ratio         PTT - ( 09 Feb 2017 07:41 )  PTT:47.7 sec    Radiology:    Physical Exam:    General:  Appears stated age, well-groomed, well-nourished, no distress  Eyes : ROBERT  Sclera anicteric  Conj Pink  EOMI  HENT:  Normocephalic, atraumatic with normal appearing ears and nose.  Neck: Supple with midline trachea.  No cervical lymphadenopathy. Normal thyroid.  Chest:  clear to P&A.  Cardiovascular:  Regular rate & rhythm.  Abdomen:  lower midline open granulating wound.  Extremities: Normal gait. No clubbing, cyanosis or edema.    Skin:  No rash.  Musculoskeletal:  normal strength for sex and age.  Neuro/Psych:  Alert, oriented to time, place and person.

## 2017-02-09 NOTE — PROGRESS NOTE ADULT - SUBJECTIVE AND OBJECTIVE BOX
Patient is a 56y old  Male who presents with a chief complaint of Fever 101 degrees F (05 Feb 2017 16:46)      INTERVAL HPI / OVERNIGHT EVENTS: fever resolved s/p IR guided drainage yesterday    MEDICATIONS  (STANDING):  ampicillin/sulbactam  IVPB 3Gram(s) IV Intermittent every 6 hours  lisinopril 20milliGRAM(s) Oral daily  sodium chloride 0.9%. 1000milliLiter(s) IV Continuous <Continuous>  heparin  Injectable 5000Unit(s) SubCutaneous every 12 hours  hydrochlorothiazide   Tablet 25milliGRAM(s) Oral daily  fluconAZOLE   Tablet 100milliGRAM(s) Oral daily  colchicine 0.6milliGRAM(s) Oral daily    MEDICATIONS  (PRN):  acetaminophen   Tablet 650milliGRAM(s) Oral every 6 hours PRN For Temp greater than 38 C (100.4 F)  cyclobenzaprine 5milliGRAM(s) Oral three times a day PRN Muscle Spasm  ondansetron Injectable 4milliGRAM(s) IV Push every 6 hours PRN Nausea and/or Vomiting      Vital Signs Last 24 Hrs  T(C): 37, Max: 37.8 (02-09 @ 05:17)  T(F): 98.6, Max: 100 (02-09 @ 05:17)  HR: 77 (72 - 96)  BP: 186/107 (147/95 - 186/107)  BP(mean): --  RR: 18 (16 - 18)  SpO2: 100% (99% - 100%)    PHYSICAL EXAM:    Constitutional: NAD, well-groomed, well-developed  Respiratory: CTAB/L  Cardiovascular: S1 and S2, RRR, no M/G/R  Gastrointestinal: BS+, soft,open wound with dressing+  Extremities: No peripheral edema  Vascular: 2+ peripheral pulses  Skin: No rashes      LABS:                        9.6    3.6   )-----------( 223      ( 09 Feb 2017 07:41 )             28.0     09 Feb 2017 07:41    139    |  101    |  16     ----------------------------<  100    3.4     |  27     |  1.47     Ca    8.0        09 Feb 2017 07:41    TPro  7.8    /  Alb  3.0    /  TBili  0.5    /  DBili  .16    /  AST  32     /  ALT  33     /  AlkPhos  65     09 Feb 2017 07:41    PT/INR - ( 09 Feb 2017 07:41 )   PT: 13.8 sec;   INR: 1.23 ratio         PTT - ( 09 Feb 2017 07:41 )  PTT:47.7 sec        MICROBIOLOGY:  RECENT CULTURES:  02-08 .Body Fluid aspiration left abdominal wall XXXX   Moderate WBC's  No organisms seen   No growth to date.    02-06 .Urine Clean Catch (Midstream) XXXX XXXX   No growth    02-05 Skin abdominal wound XXXX XXXX   Few Yeast like cells    02-05 .Blood Blood XXXX XXXX   No growth to date.          RADIOLOGY & ADDITIONAL STUDIES:

## 2017-02-09 NOTE — PROGRESS NOTE ADULT - SUBJECTIVE AND OBJECTIVE BOX
Patient seen and examined at bedside with Dr. Whitaker. Per Dr. Whitaker there are some areas of wound necrosis which may be causing the persistent low grade temps which should be debrided. Will discuss with Dr. Diego/Nay. Wound vac therapy on hold for now until after possible procedure. Patient also complaining of gout attack of the 2nd metacarpal joint of his right hand. Hand noted to have swelling and very tender to light palpation. Colchicine started.
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
## 2017-02-10 LAB
ALBUMIN SERPL ELPH-MCNC: 3.1 G/DL — LOW (ref 3.3–5)
ALP SERPL-CCNC: 70 U/L — SIGNIFICANT CHANGE UP (ref 40–120)
ALT FLD-CCNC: 32 U/L — SIGNIFICANT CHANGE UP (ref 12–78)
ANION GAP SERPL CALC-SCNC: 10 MMOL/L — SIGNIFICANT CHANGE UP (ref 5–17)
APTT BLD: 47.7 SEC — HIGH (ref 27.5–37.4)
AST SERPL-CCNC: 41 U/L — HIGH (ref 15–37)
BILIRUB DIRECT SERPL-MCNC: 0.21 MG/DL — HIGH (ref 0.05–0.2)
BILIRUB INDIRECT FLD-MCNC: 0.6 MG/DL — SIGNIFICANT CHANGE UP (ref 0.2–1)
BILIRUB SERPL-MCNC: 0.8 MG/DL — SIGNIFICANT CHANGE UP (ref 0.2–1.2)
BUN SERPL-MCNC: 14 MG/DL — SIGNIFICANT CHANGE UP (ref 7–23)
CALCIUM SERPL-MCNC: 8.1 MG/DL — LOW (ref 8.5–10.1)
CHLORIDE SERPL-SCNC: 102 MMOL/L — SIGNIFICANT CHANGE UP (ref 96–108)
CO2 SERPL-SCNC: 27 MMOL/L — SIGNIFICANT CHANGE UP (ref 22–31)
CREAT SERPL-MCNC: 1.37 MG/DL — HIGH (ref 0.5–1.3)
CULTURE RESULTS: SIGNIFICANT CHANGE UP
CULTURE RESULTS: SIGNIFICANT CHANGE UP
GLUCOSE SERPL-MCNC: 94 MG/DL — SIGNIFICANT CHANGE UP (ref 70–99)
HCT VFR BLD CALC: 27.8 % — LOW (ref 39–50)
HGB BLD-MCNC: 9.8 G/DL — LOW (ref 13–17)
INR BLD: 1.26 RATIO — HIGH (ref 0.88–1.16)
MCHC RBC-ENTMCNC: 33.7 PG — SIGNIFICANT CHANGE UP (ref 27–34)
MCHC RBC-ENTMCNC: 35.4 GM/DL — SIGNIFICANT CHANGE UP (ref 32–36)
MCV RBC AUTO: 95.3 FL — SIGNIFICANT CHANGE UP (ref 80–100)
PLATELET # BLD AUTO: 193 K/UL — SIGNIFICANT CHANGE UP (ref 150–400)
POTASSIUM SERPL-MCNC: 4.1 MMOL/L — SIGNIFICANT CHANGE UP (ref 3.5–5.3)
POTASSIUM SERPL-SCNC: 4.1 MMOL/L — SIGNIFICANT CHANGE UP (ref 3.5–5.3)
PROT SERPL-MCNC: 8.2 GM/DL — SIGNIFICANT CHANGE UP (ref 6–8.3)
PROTHROM AB SERPL-ACNC: 14.2 SEC — HIGH (ref 10–13.1)
RBC # BLD: 2.92 M/UL — LOW (ref 4.2–5.8)
RBC # FLD: 12.5 % — SIGNIFICANT CHANGE UP (ref 11–15)
SODIUM SERPL-SCNC: 139 MMOL/L — SIGNIFICANT CHANGE UP (ref 135–145)
SPECIMEN SOURCE: SIGNIFICANT CHANGE UP
SPECIMEN SOURCE: SIGNIFICANT CHANGE UP
WBC # BLD: 6 K/UL — SIGNIFICANT CHANGE UP (ref 3.8–10.5)
WBC # FLD AUTO: 6 K/UL — SIGNIFICANT CHANGE UP (ref 3.8–10.5)

## 2017-02-10 RX ADMIN — AMPICILLIN SODIUM AND SULBACTAM SODIUM 200 GRAM(S): 250; 125 INJECTION, POWDER, FOR SUSPENSION INTRAMUSCULAR; INTRAVENOUS at 23:14

## 2017-02-10 RX ADMIN — HEPARIN SODIUM 5000 UNIT(S): 5000 INJECTION INTRAVENOUS; SUBCUTANEOUS at 17:08

## 2017-02-10 RX ADMIN — AMPICILLIN SODIUM AND SULBACTAM SODIUM 200 GRAM(S): 250; 125 INJECTION, POWDER, FOR SUSPENSION INTRAMUSCULAR; INTRAVENOUS at 05:45

## 2017-02-10 RX ADMIN — Medication 0.6 MILLIGRAM(S): at 11:17

## 2017-02-10 RX ADMIN — LISINOPRIL 20 MILLIGRAM(S): 2.5 TABLET ORAL at 05:44

## 2017-02-10 RX ADMIN — HEPARIN SODIUM 5000 UNIT(S): 5000 INJECTION INTRAVENOUS; SUBCUTANEOUS at 05:46

## 2017-02-10 RX ADMIN — AMPICILLIN SODIUM AND SULBACTAM SODIUM 200 GRAM(S): 250; 125 INJECTION, POWDER, FOR SUSPENSION INTRAMUSCULAR; INTRAVENOUS at 17:08

## 2017-02-10 RX ADMIN — FLUCONAZOLE 100 MILLIGRAM(S): 150 TABLET ORAL at 11:17

## 2017-02-10 RX ADMIN — AMPICILLIN SODIUM AND SULBACTAM SODIUM 200 GRAM(S): 250; 125 INJECTION, POWDER, FOR SUSPENSION INTRAMUSCULAR; INTRAVENOUS at 11:17

## 2017-02-10 NOTE — PROGRESS NOTE ADULT - SUBJECTIVE AND OBJECTIVE BOX
Patient is a 56y old  Male who presents with a chief complaint of Fever 101 degrees F (05 Feb 2017 16:46)      INTERVAL HPI / OVERNIGHT EVENTS: fever resolved s/p IR guided drainage on wednesday    MEDICATIONS  (STANDING):  ampicillin/sulbactam  IVPB 3Gram(s) IV Intermittent every 6 hours  lisinopril 20milliGRAM(s) Oral daily  sodium chloride 0.9%. 1000milliLiter(s) IV Continuous <Continuous>  heparin  Injectable 5000Unit(s) SubCutaneous every 12 hours  hydrochlorothiazide   Tablet 25milliGRAM(s) Oral daily  fluconAZOLE   Tablet 100milliGRAM(s) Oral daily  colchicine 0.6milliGRAM(s) Oral daily    MEDICATIONS  (PRN):  acetaminophen   Tablet 650milliGRAM(s) Oral every 6 hours PRN For Temp greater than 38 C (100.4 F)  cyclobenzaprine 5milliGRAM(s) Oral three times a day PRN Muscle Spasm  ondansetron Injectable 4milliGRAM(s) IV Push every 6 hours PRN Nausea and/or Vomiting      Vital Signs Last 24 Hrs  T(C): 37, Max: 37.8 (02-09 @ 05:17)  T(F): 98.6, Max: 100 (02-09 @ 05:17)  HR: 77 (72 - 96)  BP: 186/107 (147/95 - 186/107)  BP(mean): --  RR: 18 (16 - 18)  SpO2: 100% (99% - 100%)    PHYSICAL EXAM:    Constitutional: NAD, well-groomed, well-developed  Respiratory: CTAB/L  Cardiovascular: S1 and S2, RRR, no M/G/R  Gastrointestinal: BS+, soft,open wound with dressing+  Extremities: No peripheral edema  Vascular: 2+ peripheral pulses  Skin: No rashes      LABS:                        9.6    3.6   )-----------( 223      ( 09 Feb 2017 07:41 )             28.0     09 Feb 2017 07:41    139    |  101    |  16     ----------------------------<  100    3.4     |  27     |  1.47     Ca    8.0        09 Feb 2017 07:41    TPro  7.8    /  Alb  3.0    /  TBili  0.5    /  DBili  .16    /  AST  32     /  ALT  33     /  AlkPhos  65     09 Feb 2017 07:41    PT/INR - ( 09 Feb 2017 07:41 )   PT: 13.8 sec;   INR: 1.23 ratio         PTT - ( 09 Feb 2017 07:41 )  PTT:47.7 sec        MICROBIOLOGY:  RECENT CULTURES:  02-08 .Body Fluid aspiration left abdominal wall XXXX   Moderate WBC's  No organisms seen   No growth to date.    02-06 .Urine Clean Catch (Midstream) XXXX XXXX   No growth    02-05 Skin abdominal wound XXXX XXXX   Few Yeast like cells    02-05 .Blood Blood XXXX XXXX   No growth to date.          RADIOLOGY & ADDITIONAL STUDIES:

## 2017-02-10 NOTE — PROGRESS NOTE ADULT - SUBJECTIVE AND OBJECTIVE BOX
Patient seen and examined at bedside resting comfortably. He admits to mild abdominal pain 2/2 to wound.    Vital Signs Last 24 Hrs  T(F): 99.4, Max: 100.2 (02-09 @ 23:12)  HR: 86  BP: 144/97  RR: 18  SpO2: 96%    GENERAL: Alert, NAD  CHEST/LUNG: Clear to auscultation bilaterally, respirations nonlabored  HEART: S1S2, Regular rate and rhythm;   ABDOMEN: nondistended. Midline abdominal wound dressing removed. Wound was moist, nonmalodorous, nontender, with visible granulation tissue and scattered sites of necrosis and slough with drainage. No active bleeding. Removed unattached necrotic tissue. Tunneling of wound noted on left side. Site irrigated with normal saline and dried with gauze. Nonadherent dressing was place over the wound, covered with gauze, then covered with abdominal pad and sealed with silk tape. Patient tolerated dressing change well.   EXTREMITIES:  no calf tenderness, No edema    I&O's Detail    I & Os for current day (as of 10 Feb 2017 09:52)  =============================================  IN:    sodium chloride 0.9%.: 900 ml    Oral Fluid: 730 ml    Solution: 200 ml    Total IN: 1830 ml  ---------------------------------------------  OUT:    Voided: 3400 ml    Total OUT: 3400 ml  ---------------------------------------------  Total NET: -1570 ml    LABS:                        9.8    6.0   )-----------( 193      ( 10 Feb 2017 07:50 )             27.8     10 Feb 2017 07:50    139    |  102    |  14     ----------------------------<  94     4.1     |  27     |  1.37     Ca    8.1        10 Feb 2017 07:50    TPro  8.2    /  Alb  3.1    /  TBili  0.8    /  DBili  .21    /  AST  41     /  ALT  32     /  AlkPhos  70     10 Feb 2017 07:50    PT/INR - ( 10 Feb 2017 07:50 )   PT: 14.2 sec;   INR: 1.26 ratio       PTT - ( 10 Feb 2017 07:50 )  PTT:47.7 sec    55 y/o male with PMH of HTN, asthma, and abdominal wall abscess s/p I+D with wound vac care, readmitted for fever, now with persistent low grade fever.     Plan:   - possible wound redebridement for necrotic tissue before placing wound vac  - continue local wound care and daily dressing changes  - continue abx as per ID  - trend vitals and labs, tylenol for fever PRN  - IVF discontinued  - DVT prophylaxis, OOB, Ambulating  - continue medical management/supportive care  - will discuss with surgical attending

## 2017-02-10 NOTE — PROGRESS NOTE ADULT - SUBJECTIVE AND OBJECTIVE BOX
I have seen and examined the patient and fatimah with Kayla Viera the surgical PA's note.  Modifications have been entered where needed and supplemental orders ordered as needed.  The aspiration culture show no growth - will discuss with ID PO ABX and possible discharge - We have no plans at present to re-explore the wound as there is drainage of purulent fluid which hopefully will end the low grade temp issue    Dr. Paras Diego contact information [phone]

## 2017-02-11 LAB
ANION GAP SERPL CALC-SCNC: 8 MMOL/L — SIGNIFICANT CHANGE UP (ref 5–17)
BUN SERPL-MCNC: 16 MG/DL — SIGNIFICANT CHANGE UP (ref 7–23)
CALCIUM SERPL-MCNC: 8.1 MG/DL — LOW (ref 8.5–10.1)
CHLORIDE SERPL-SCNC: 101 MMOL/L — SIGNIFICANT CHANGE UP (ref 96–108)
CO2 SERPL-SCNC: 28 MMOL/L — SIGNIFICANT CHANGE UP (ref 22–31)
CREAT SERPL-MCNC: 1.39 MG/DL — HIGH (ref 0.5–1.3)
GLUCOSE SERPL-MCNC: 121 MG/DL — HIGH (ref 70–99)
HCT VFR BLD CALC: 26.7 % — LOW (ref 39–50)
HGB BLD-MCNC: 9.5 G/DL — LOW (ref 13–17)
MCHC RBC-ENTMCNC: 32.6 PG — SIGNIFICANT CHANGE UP (ref 27–34)
MCHC RBC-ENTMCNC: 35.4 GM/DL — SIGNIFICANT CHANGE UP (ref 32–36)
MCV RBC AUTO: 91.8 FL — SIGNIFICANT CHANGE UP (ref 80–100)
PLATELET # BLD AUTO: 210 K/UL — SIGNIFICANT CHANGE UP (ref 150–400)
POTASSIUM SERPL-MCNC: 3.2 MMOL/L — LOW (ref 3.5–5.3)
POTASSIUM SERPL-SCNC: 3.2 MMOL/L — LOW (ref 3.5–5.3)
RBC # BLD: 2.9 M/UL — LOW (ref 4.2–5.8)
RBC # FLD: 11.7 % — SIGNIFICANT CHANGE UP (ref 11–15)
SODIUM SERPL-SCNC: 137 MMOL/L — SIGNIFICANT CHANGE UP (ref 135–145)
WBC # BLD: 5.9 K/UL — SIGNIFICANT CHANGE UP (ref 3.8–10.5)
WBC # FLD AUTO: 5.9 K/UL — SIGNIFICANT CHANGE UP (ref 3.8–10.5)

## 2017-02-11 RX ORDER — POTASSIUM CHLORIDE 20 MEQ
20 PACKET (EA) ORAL
Qty: 0 | Refills: 0 | Status: COMPLETED | OUTPATIENT
Start: 2017-02-11 | End: 2017-02-11

## 2017-02-11 RX ADMIN — Medication 0.6 MILLIGRAM(S): at 13:29

## 2017-02-11 RX ADMIN — AMPICILLIN SODIUM AND SULBACTAM SODIUM 200 GRAM(S): 250; 125 INJECTION, POWDER, FOR SUSPENSION INTRAMUSCULAR; INTRAVENOUS at 23:19

## 2017-02-11 RX ADMIN — AMPICILLIN SODIUM AND SULBACTAM SODIUM 200 GRAM(S): 250; 125 INJECTION, POWDER, FOR SUSPENSION INTRAMUSCULAR; INTRAVENOUS at 06:15

## 2017-02-11 RX ADMIN — AMPICILLIN SODIUM AND SULBACTAM SODIUM 200 GRAM(S): 250; 125 INJECTION, POWDER, FOR SUSPENSION INTRAMUSCULAR; INTRAVENOUS at 13:33

## 2017-02-11 RX ADMIN — FLUCONAZOLE 100 MILLIGRAM(S): 150 TABLET ORAL at 13:30

## 2017-02-11 RX ADMIN — HEPARIN SODIUM 5000 UNIT(S): 5000 INJECTION INTRAVENOUS; SUBCUTANEOUS at 06:16

## 2017-02-11 RX ADMIN — AMPICILLIN SODIUM AND SULBACTAM SODIUM 200 GRAM(S): 250; 125 INJECTION, POWDER, FOR SUSPENSION INTRAMUSCULAR; INTRAVENOUS at 17:13

## 2017-02-11 RX ADMIN — LISINOPRIL 20 MILLIGRAM(S): 2.5 TABLET ORAL at 06:24

## 2017-02-11 RX ADMIN — Medication 20 MILLIEQUIVALENT(S): at 13:32

## 2017-02-11 RX ADMIN — HEPARIN SODIUM 5000 UNIT(S): 5000 INJECTION INTRAVENOUS; SUBCUTANEOUS at 17:13

## 2017-02-11 RX ADMIN — Medication 20 MILLIEQUIVALENT(S): at 17:13

## 2017-02-11 NOTE — PROGRESS NOTE ADULT - SUBJECTIVE AND OBJECTIVE BOX
Comfortable  Tmax 100.2    Wound clean, no purulence  Wet to dry dsgs daily  Unasyn while still febrile

## 2017-02-11 NOTE — PROGRESS NOTE ADULT - SUBJECTIVE AND OBJECTIVE BOX
Patient seen and examined on rounds with Dr Jones.  No acute events overnight.  No new complaints.  Still having intermittent low grade fevers.    HPI:  56 year old male with PMHx of HTN, asthma, etoh abuse who presents to ED today with 101 fever after being discharged yesterday s/p I&D of infected hematoma (1/23/17) with return to OR for hemostasis same day, s/p wound debridement and wound vac placement on 1/27/2017, s/p wound vac exchange on 1/30/17. No abdominal pain and no aggravating or relieving factors. Pt is non-toxic appearing. Patient denies abdominal pain, chills, nausea, vomiting, constipation, diarrhea, hematuria, chest pain, SOB, back pain. (05 Feb 2017 16:46)      Vital Signs Last 24 Hrs  T(C): 37.1, Max: 37.9 (02-10 @ 16:45)  T(F): 98.8, Max: 100.2 (02-10 @ 16:45)  HR: 88 (82 - 89)  BP: 142/94 (142/94 - 150/98)  BP(mean): --  RR: 16 (16 - 17)  SpO2: 100% (100% - 100%)                          9.5    5.9   )-----------( 210      ( 11 Feb 2017 07:35 )             26.7       11 Feb 2017 07:35    137    |  101    |  16     ----------------------------<  121    3.2     |  28     |  1.39     Ca    8.1        11 Feb 2017 07:35    TPro  8.2    /  Alb  3.1    /  TBili  0.8    /  DBili  .21    /  AST  41     /  ALT  32     /  AlkPhos  70     10 Feb 2017 07:50  LIVER FUNCTIONS - ( 10 Feb 2017 07:50 )  Alb: 3.1 g/dL / Pro: 8.2 gm/dL / ALK PHOS: 70 U/L / ALT: 32 U/L / AST: 41 U/L / GGT: x           I&O's Summary  I & Os for 24h ending 11 Feb 2017 07:00  =============================================  IN: 970 ml / OUT: 0 ml / NET: 970 ml    I & Os for current day (as of 11 Feb 2017 11:35)  =============================================  IN: 360 ml / OUT: 0 ml / NET: 360 ml      Physical Exam  GEN: A/O/3 NAD   HEENT: NCAT, Trachea midline, no scleral icterus  Resp: unlabored, No SOB  CV: No Tachycardia,  ABD: Soft, dressing removed, midline abdominal wound with granulation tissue in the wound bed, re-dressed with wet/dry dressing  EXT: warm well perfused,     A/P: 56yMale a/w fevers s/p I&D of infected hematoma (1/23/17) with return to OR for hemostasis same day, s/p wound debridement and wound vac placement on 1/27/2017, s/p wound vac exchange on 1/30/17. Wound improving, but patient continues with low grade intermittent fevers.     -Wet/Dry daily dressing change  -Per Dr Jones no need for repeat debridement at this time.  -continue abx per ID  -pain control PRN  -encourage OOB  -DVT prophylaxis Patient seen and examined on rounds with Dr Jones.  No acute events overnight.  No new complaints.  Still having intermittent low grade fevers.    HPI:  56 year old male with PMHx of HTN, asthma, etoh abuse who presents to ED today with 101 fever after being discharged yesterday s/p I&D of infected hematoma (1/23/17) with return to OR for hemostasis same day, s/p wound debridement and wound vac placement on 1/27/2017, s/p wound vac exchange on 1/30/17. No abdominal pain and no aggravating or relieving factors. Pt is non-toxic appearing. Patient denies abdominal pain, chills, nausea, vomiting, constipation, diarrhea, hematuria, chest pain, SOB, back pain. (05 Feb 2017 16:46)      Vital Signs Last 24 Hrs  T(C): 37.1, Max: 37.9 (02-10 @ 16:45)  T(F): 98.8, Max: 100.2 (02-10 @ 16:45)  HR: 88 (82 - 89)  BP: 142/94 (142/94 - 150/98)  BP(mean): --  RR: 16 (16 - 17)  SpO2: 100% (100% - 100%)                          9.5    5.9   )-----------( 210      ( 11 Feb 2017 07:35 )             26.7       11 Feb 2017 07:35    137    |  101    |  16     ----------------------------<  121    3.2     |  28     |  1.39     Ca    8.1        11 Feb 2017 07:35    TPro  8.2    /  Alb  3.1    /  TBili  0.8    /  DBili  .21    /  AST  41     /  ALT  32     /  AlkPhos  70     10 Feb 2017 07:50  LIVER FUNCTIONS - ( 10 Feb 2017 07:50 )  Alb: 3.1 g/dL / Pro: 8.2 gm/dL / ALK PHOS: 70 U/L / ALT: 32 U/L / AST: 41 U/L / GGT: x           I&O's Summary  I & Os for 24h ending 11 Feb 2017 07:00  =============================================  IN: 970 ml / OUT: 0 ml / NET: 970 ml    I & Os for current day (as of 11 Feb 2017 11:35)  =============================================  IN: 360 ml / OUT: 0 ml / NET: 360 ml      Physical Exam  GEN: A/O/3 NAD   HEENT: NCAT, Trachea midline, no scleral icterus  Resp: unlabored, No SOB  CV: No Tachycardia,  ABD: Soft, dressing removed, midline abdominal wound with granulation tissue in the wound bed, re-dressed with wet/dry dressing  EXT: warm well perfused,     A/P: 56yMale a/w fevers s/p I&D of infected hematoma (1/23/17) with return to OR for hemostasis same day, s/p wound debridement and wound vac placement on 1/27/2017, s/p wound vac exchange on 1/30/17. Wound improving, but patient continues with low grade intermittent fevers. Now with mild hypokalemia    -Wet/Dry daily dressing change  -Per Dr Jones no need for repeat debridement at this time.  -continue abx per ID  -pain control PRN  -replete lytes prn  -repeat labs in a.m.  -encourage OOB  -DVT prophylaxis

## 2017-02-12 LAB
ANION GAP SERPL CALC-SCNC: 9 MMOL/L — SIGNIFICANT CHANGE UP (ref 5–17)
BUN SERPL-MCNC: 19 MG/DL — SIGNIFICANT CHANGE UP (ref 7–23)
CALCIUM SERPL-MCNC: 8.2 MG/DL — LOW (ref 8.5–10.1)
CHLORIDE SERPL-SCNC: 101 MMOL/L — SIGNIFICANT CHANGE UP (ref 96–108)
CO2 SERPL-SCNC: 28 MMOL/L — SIGNIFICANT CHANGE UP (ref 22–31)
CREAT SERPL-MCNC: 1.36 MG/DL — HIGH (ref 0.5–1.3)
GLUCOSE SERPL-MCNC: 87 MG/DL — SIGNIFICANT CHANGE UP (ref 70–99)
HCT VFR BLD CALC: 24.6 % — LOW (ref 39–50)
HGB BLD-MCNC: 8.8 G/DL — LOW (ref 13–17)
MAGNESIUM SERPL-MCNC: 1.8 MG/DL — SIGNIFICANT CHANGE UP (ref 1.8–2.4)
MCHC RBC-ENTMCNC: 34.1 PG — HIGH (ref 27–34)
MCHC RBC-ENTMCNC: 35.9 GM/DL — SIGNIFICANT CHANGE UP (ref 32–36)
MCV RBC AUTO: 95 FL — SIGNIFICANT CHANGE UP (ref 80–100)
PHOSPHATE SERPL-MCNC: 2.8 MG/DL — SIGNIFICANT CHANGE UP (ref 2.5–4.5)
PLATELET # BLD AUTO: 210 K/UL — SIGNIFICANT CHANGE UP (ref 150–400)
POTASSIUM SERPL-MCNC: 3.6 MMOL/L — SIGNIFICANT CHANGE UP (ref 3.5–5.3)
POTASSIUM SERPL-SCNC: 3.6 MMOL/L — SIGNIFICANT CHANGE UP (ref 3.5–5.3)
RBC # BLD: 2.6 M/UL — LOW (ref 4.2–5.8)
RBC # FLD: 12.5 % — SIGNIFICANT CHANGE UP (ref 11–15)
SODIUM SERPL-SCNC: 138 MMOL/L — SIGNIFICANT CHANGE UP (ref 135–145)
WBC # BLD: 6 K/UL — SIGNIFICANT CHANGE UP (ref 3.8–10.5)
WBC # FLD AUTO: 6 K/UL — SIGNIFICANT CHANGE UP (ref 3.8–10.5)

## 2017-02-12 RX ADMIN — FLUCONAZOLE 100 MILLIGRAM(S): 150 TABLET ORAL at 11:28

## 2017-02-12 RX ADMIN — AMPICILLIN SODIUM AND SULBACTAM SODIUM 200 GRAM(S): 250; 125 INJECTION, POWDER, FOR SUSPENSION INTRAMUSCULAR; INTRAVENOUS at 05:22

## 2017-02-12 RX ADMIN — HEPARIN SODIUM 5000 UNIT(S): 5000 INJECTION INTRAVENOUS; SUBCUTANEOUS at 17:52

## 2017-02-12 RX ADMIN — HEPARIN SODIUM 5000 UNIT(S): 5000 INJECTION INTRAVENOUS; SUBCUTANEOUS at 05:25

## 2017-02-12 RX ADMIN — AMPICILLIN SODIUM AND SULBACTAM SODIUM 200 GRAM(S): 250; 125 INJECTION, POWDER, FOR SUSPENSION INTRAMUSCULAR; INTRAVENOUS at 17:52

## 2017-02-12 RX ADMIN — Medication 0.6 MILLIGRAM(S): at 11:28

## 2017-02-12 RX ADMIN — AMPICILLIN SODIUM AND SULBACTAM SODIUM 200 GRAM(S): 250; 125 INJECTION, POWDER, FOR SUSPENSION INTRAMUSCULAR; INTRAVENOUS at 23:28

## 2017-02-12 RX ADMIN — AMPICILLIN SODIUM AND SULBACTAM SODIUM 200 GRAM(S): 250; 125 INJECTION, POWDER, FOR SUSPENSION INTRAMUSCULAR; INTRAVENOUS at 11:28

## 2017-02-12 RX ADMIN — LISINOPRIL 20 MILLIGRAM(S): 2.5 TABLET ORAL at 05:23

## 2017-02-12 NOTE — PROGRESS NOTE ADULT - SUBJECTIVE AND OBJECTIVE BOX
INTERVAL HPI/OVERNIGHT EVENTS:    Patient lying comfortably.  No complaint of abdominal pain.  Tolerating diet with no nausea/vomiting.  +Flatus/BM.  Voiding good.  Wound granulating well.  Eval done yesterday with no need of further debridement.  Last low grade fever recorded with Tmax of 100.2 was 2/10/17 1645p.  Afebrile since then.        Vital Signs Last 24 Hrs  T(C): 37.6, Max: 37.6 (02-12 @ 05:37)  T(F): 99.6, Max: 99.6 (02-12 @ 05:37)  HR: 97 (84 - 97)  BP: 129/90 (129/90 - 148/104)  BP(mean): --  RR: 18 (17 - 18)  SpO2: 100% (99% - 100%)    MEDICATIONS  (STANDING):  ampicillin/sulbactam  IVPB 3Gram(s) IV Intermittent every 6 hours  lisinopril 20milliGRAM(s) Oral daily  heparin  Injectable 5000Unit(s) SubCutaneous every 12 hours  fluconAZOLE   Tablet 100milliGRAM(s) Oral daily  colchicine 0.6milliGRAM(s) Oral daily  hydrochlorothiazide   Tablet 50milliGRAM(s) Oral daily    MEDICATIONS  (PRN):  acetaminophen   Tablet 650milliGRAM(s) Oral every 6 hours PRN For Temp greater than 38 C (100.4 F)  cyclobenzaprine 5milliGRAM(s) Oral three times a day PRN Muscle Spasm  ondansetron Injectable 4milliGRAM(s) IV Push every 6 hours PRN Nausea and/or Vomiting      PHYSICAL EXAM:    GENERAL: NAD  HEAD:  Atraumatic, Normocephalic  EYES: EOMI, PERRLA, conjunctiva and sclera clear  CHEST/LUNG: Clear to ausculation, bilaterally   HEART: S1S2  ABDOMEN: wound +granulation tissue, hematoma improving, ND, +BS, non tender , no guarding   EXTREMITIES:  calf soft, non tender     I&O's Detail  I & Os for 24h ending 11 Feb 2017 07:00  =============================================  IN:    Oral Fluid: 770 ml    Solution: 200 ml    Total IN: 970 ml  ---------------------------------------------  OUT:    Total OUT: 0 ml  ---------------------------------------------  Total NET: 970 ml    I & Os for current day (as of 12 Feb 2017 06:08)  =============================================  IN:    Oral Fluid: 1080 ml    Solution: 100 ml    Total IN: 1180 ml  ---------------------------------------------  OUT:    Total OUT: 0 ml  ---------------------------------------------  Total NET: 1180 ml      LABS:                        9.5    5.9   )-----------( 210      ( 11 Feb 2017 07:35 )             26.7     11 Feb 2017 07:35    137    |  101    |  16     ----------------------------<  121    3.2     |  28     |  1.39     Ca    8.1        11 Feb 2017 07:35    TPro  8.2    /  Alb  3.1    /  TBili  0.8    /  DBili  .21    /  AST  41     /  ALT  32     /  AlkPhos  70     10 Feb 2017 07:50    PT/INR - ( 10 Feb 2017 07:50 )   PT: 14.2 sec;   INR: 1.26 ratio         PTT - ( 10 Feb 2017 07:50 )  PTT:47.7 sec      Cultures NGTD     Impression:    56yMale a/w fevers s/p I&D of infected hematoma (1/23/17) with return to OR for hemostasis same day, s/p wound debridement and wound vac placement on 1/27/2017, s/p wound vac exchange on 1/30/17.       Plan:  continue wound care daily -- wet to dry dressing   afebrile since 2/10,  continue antibiotic per ID, will switch to oral   continue diet as tolerated   prophylactic measure:  Incentive spirometry, venodynes, DVT prophylaxis, OOB to ambulate   continue medical management/supportive care   CM/SW for DC planning -- wound care -- wound to be irrigated with NS and wet to dry dressing daily.   will discuss with surgical attending re:  DC planning.

## 2017-02-13 ENCOUNTER — RESULT REVIEW (OUTPATIENT)
Age: 57
End: 2017-02-13

## 2017-02-13 LAB
ALBUMIN SERPL ELPH-MCNC: 3 G/DL — LOW (ref 3.3–5)
ALP SERPL-CCNC: 77 U/L — SIGNIFICANT CHANGE UP (ref 40–120)
ALT FLD-CCNC: 34 U/L — SIGNIFICANT CHANGE UP (ref 12–78)
ANION GAP SERPL CALC-SCNC: 8 MMOL/L — SIGNIFICANT CHANGE UP (ref 5–17)
AST SERPL-CCNC: 38 U/L — HIGH (ref 15–37)
BILIRUB SERPL-MCNC: 0.6 MG/DL — SIGNIFICANT CHANGE UP (ref 0.2–1.2)
BUN SERPL-MCNC: 18 MG/DL — SIGNIFICANT CHANGE UP (ref 7–23)
CALCIUM SERPL-MCNC: 8.3 MG/DL — LOW (ref 8.5–10.1)
CHLORIDE SERPL-SCNC: 101 MMOL/L — SIGNIFICANT CHANGE UP (ref 96–108)
CO2 SERPL-SCNC: 30 MMOL/L — SIGNIFICANT CHANGE UP (ref 22–31)
CREAT SERPL-MCNC: 1.29 MG/DL — SIGNIFICANT CHANGE UP (ref 0.5–1.3)
CULTURE RESULTS: SIGNIFICANT CHANGE UP
GLUCOSE SERPL-MCNC: 90 MG/DL — SIGNIFICANT CHANGE UP (ref 70–99)
GRAM STN FLD: SIGNIFICANT CHANGE UP
HCT VFR BLD CALC: 24.4 % — LOW (ref 39–50)
HGB BLD-MCNC: 8.8 G/DL — LOW (ref 13–17)
MCHC RBC-ENTMCNC: 33.5 PG — SIGNIFICANT CHANGE UP (ref 27–34)
MCHC RBC-ENTMCNC: 36.3 GM/DL — HIGH (ref 32–36)
MCV RBC AUTO: 92.2 FL — SIGNIFICANT CHANGE UP (ref 80–100)
PLATELET # BLD AUTO: 260 K/UL — SIGNIFICANT CHANGE UP (ref 150–400)
POTASSIUM SERPL-MCNC: 4 MMOL/L — SIGNIFICANT CHANGE UP (ref 3.5–5.3)
POTASSIUM SERPL-SCNC: 4 MMOL/L — SIGNIFICANT CHANGE UP (ref 3.5–5.3)
PROT SERPL-MCNC: 8.2 GM/DL — SIGNIFICANT CHANGE UP (ref 6–8.3)
RBC # BLD: 2.64 M/UL — LOW (ref 4.2–5.8)
RBC # FLD: 11.7 % — SIGNIFICANT CHANGE UP (ref 11–15)
SODIUM SERPL-SCNC: 139 MMOL/L — SIGNIFICANT CHANGE UP (ref 135–145)
SPECIMEN SOURCE: SIGNIFICANT CHANGE UP
WBC # BLD: 5 K/UL — SIGNIFICANT CHANGE UP (ref 3.8–10.5)
WBC # FLD AUTO: 5 K/UL — SIGNIFICANT CHANGE UP (ref 3.8–10.5)

## 2017-02-13 RX ADMIN — AMPICILLIN SODIUM AND SULBACTAM SODIUM 200 GRAM(S): 250; 125 INJECTION, POWDER, FOR SUSPENSION INTRAMUSCULAR; INTRAVENOUS at 23:29

## 2017-02-13 RX ADMIN — Medication 0.6 MILLIGRAM(S): at 12:51

## 2017-02-13 RX ADMIN — HEPARIN SODIUM 5000 UNIT(S): 5000 INJECTION INTRAVENOUS; SUBCUTANEOUS at 05:34

## 2017-02-13 RX ADMIN — AMPICILLIN SODIUM AND SULBACTAM SODIUM 200 GRAM(S): 250; 125 INJECTION, POWDER, FOR SUSPENSION INTRAMUSCULAR; INTRAVENOUS at 18:30

## 2017-02-13 RX ADMIN — AMPICILLIN SODIUM AND SULBACTAM SODIUM 200 GRAM(S): 250; 125 INJECTION, POWDER, FOR SUSPENSION INTRAMUSCULAR; INTRAVENOUS at 14:34

## 2017-02-13 RX ADMIN — HEPARIN SODIUM 5000 UNIT(S): 5000 INJECTION INTRAVENOUS; SUBCUTANEOUS at 17:14

## 2017-02-13 RX ADMIN — AMPICILLIN SODIUM AND SULBACTAM SODIUM 200 GRAM(S): 250; 125 INJECTION, POWDER, FOR SUSPENSION INTRAMUSCULAR; INTRAVENOUS at 05:34

## 2017-02-13 RX ADMIN — FLUCONAZOLE 100 MILLIGRAM(S): 150 TABLET ORAL at 12:51

## 2017-02-13 RX ADMIN — LISINOPRIL 20 MILLIGRAM(S): 2.5 TABLET ORAL at 05:34

## 2017-02-13 NOTE — PROGRESS NOTE ADULT - SUBJECTIVE AND OBJECTIVE BOX
Patient seen and examined at bedside with no complaints. Patient tolerating diet; denies abdominal pain/n/v. Pt denies fever, chills, chest pain, sob. Voiding without difficulty. +flatus/+BM.     Vital Signs Last 24 Hrs  T(F): 99.6, Max: 99.8 (02-12 @ 17:29)  HR: 98  BP: 124/85  RR: 18  SpO2: 100%    GENERAL: Alert, oriented, NAD  CHEST/LUNG: Clear to auscultation bilaterally, respirations nonlabored  HEART: S1S2, Regular rate and rhythm  ABDOMEN: + Bowel sounds, soft, wound with granulation tissue, nondistended, nontender  EXTREMITIES:  no calf tenderness, no pedal edema b/l     AM labs pending    Impression: 56 year old male a/w fevers s/p I&D of infected hematoma (1/23/17) with same day return to OR for hemostasis, s/p wound debridement and wound vac placement on 1/27/2017, s/p wound vac exchange on 1/30/17     Plan:  - continue abdominal wound care daily with wet-to-dry dressings   - c/w diet  - c/w incentive spirometer, SCDs, DVT ppx, OOB to ambulate  - pain management PRN   - c/w all medical management  - c/w ID f/u, recs appreciated for switch to PO levaquin when afebrile  - f/u plastics recommendations  - will d/w attending

## 2017-02-13 NOTE — PROGRESS NOTE ADULT - SUBJECTIVE AND OBJECTIVE BOX
Patient is a 56y old  Male who presents with a chief complaint of Fever 101 degrees F (05 Feb 2017 16:46)      INTERVAL HPI / OVERNIGHT EVENTS: fever resolved in the last few days, denies any other complaints  MEDICATIONS  (STANDING):  ampicillin/sulbactam  IVPB 3Gram(s) IV Intermittent every 6 hours  lisinopril 20milliGRAM(s) Oral daily  heparin  Injectable 5000Unit(s) SubCutaneous every 12 hours  fluconAZOLE   Tablet 100milliGRAM(s) Oral daily  colchicine 0.6milliGRAM(s) Oral daily  hydrochlorothiazide   Tablet 50milliGRAM(s) Oral daily    MEDICATIONS  (PRN):  acetaminophen   Tablet 650milliGRAM(s) Oral every 6 hours PRN For Temp greater than 38 C (100.4 F)  cyclobenzaprine 5milliGRAM(s) Oral three times a day PRN Muscle Spasm  ondansetron Injectable 4milliGRAM(s) IV Push every 6 hours PRN Nausea and/or Vomiting      Vital Signs Last 24 Hrs  T(C): 37.2, Max: 37.6 (02-13 @ 04:35)  T(F): 99, Max: 99.6 (02-13 @ 04:35)  HR: 93 (85 - 98)  BP: 136/73 (124/85 - 136/73)  BP(mean): --  RR: 17 (16 - 18)  SpO2: 100% (100% - 100%)    PHYSICAL EXAM:    Constitutional: NAD, well-groomed, well-developed  Respiratory: CTAB/L  Cardiovascular: S1 and S2, RRR, no M/G/R  Gastrointestinal: BS+, soft, NT/ND  Extremities: No peripheral edema  Vascular: 2+ peripheral pulses  Skin: abdominal open wound+ clean granular base    LABS:                        8.8    5.0   )-----------( 260      ( 13 Feb 2017 06:39 )             24.4     13 Feb 2017 06:39    139    |  101    |  18     ----------------------------<  90     4.0     |  30     |  1.29     Ca    8.3        13 Feb 2017 06:39  Phos  2.8       12 Feb 2017 06:53  Mg     1.8       12 Feb 2017 06:53    TPro  8.2    /  Alb  3.0    /  TBili  0.6    /  DBili  x      /  AST  38     /  ALT  34     /  AlkPhos  77     13 Feb 2017 06:39            MICROBIOLOGY:  RECENT CULTURES:  02-08 .Body Fluid aspiration left abdominal wall XXXX   Moderate WBC's  No organisms seen   No growth at 5 days          RADIOLOGY & ADDITIONAL STUDIES:

## 2017-02-13 NOTE — PROGRESS NOTE ADULT - SUBJECTIVE AND OBJECTIVE BOX
No complaints  VSS, Tm 99.8     Wound clean, dry, no purulence or necrosis  Await word from ID abt further abx  Eventual skin graft by Dr Whitaker

## 2017-02-13 NOTE — PROGRESS NOTE ADULT - SUBJECTIVE AND OBJECTIVE BOX
pt alertr and awake  afeb, vss  abdominal wound with decreased tenderness, no erythema, minimal or no drainage, granulating.  discussed at length with pt, ID and Dr Diego who all agree with plan of repairing with split thickness skin grafting.  options, alternatives, risks, complications discussed.  will proceed asap.  continue iv abx.

## 2017-02-13 NOTE — CHART NOTE - NSCHARTNOTEFT_GEN_A_CORE
Pt seen and examined at bedside without complaints.   Dressing removed to expose abdominal wound. No purulent or bloody discharge from wound. +Granulation tissue. Wet to dry dressing placed with gauze. External dressing with gauze and abdominal pad.   Pt tolerated well.    Thegeovanny FULTON

## 2017-02-14 LAB
ALBUMIN SERPL ELPH-MCNC: 3.3 G/DL — SIGNIFICANT CHANGE UP (ref 3.3–5)
ALP SERPL-CCNC: 78 U/L — SIGNIFICANT CHANGE UP (ref 40–120)
ALT FLD-CCNC: 43 U/L — SIGNIFICANT CHANGE UP (ref 12–78)
ANION GAP SERPL CALC-SCNC: 9 MMOL/L — SIGNIFICANT CHANGE UP (ref 5–17)
AST SERPL-CCNC: 38 U/L — HIGH (ref 15–37)
BILIRUB DIRECT SERPL-MCNC: 0.18 MG/DL — SIGNIFICANT CHANGE UP (ref 0.05–0.2)
BILIRUB INDIRECT FLD-MCNC: 0.4 MG/DL — SIGNIFICANT CHANGE UP (ref 0.2–1)
BILIRUB SERPL-MCNC: 0.6 MG/DL — SIGNIFICANT CHANGE UP (ref 0.2–1.2)
BUN SERPL-MCNC: 17 MG/DL — SIGNIFICANT CHANGE UP (ref 7–23)
CALCIUM SERPL-MCNC: 8.9 MG/DL — SIGNIFICANT CHANGE UP (ref 8.5–10.1)
CHLORIDE SERPL-SCNC: 100 MMOL/L — SIGNIFICANT CHANGE UP (ref 96–108)
CO2 SERPL-SCNC: 29 MMOL/L — SIGNIFICANT CHANGE UP (ref 22–31)
CREAT SERPL-MCNC: 1.43 MG/DL — HIGH (ref 0.5–1.3)
GLUCOSE SERPL-MCNC: 93 MG/DL — SIGNIFICANT CHANGE UP (ref 70–99)
HCT VFR BLD CALC: 26.3 % — LOW (ref 39–50)
HGB BLD-MCNC: 9.4 G/DL — LOW (ref 13–17)
MAGNESIUM SERPL-MCNC: 2.1 MG/DL — SIGNIFICANT CHANGE UP (ref 1.8–2.4)
MCHC RBC-ENTMCNC: 33.8 PG — SIGNIFICANT CHANGE UP (ref 27–34)
MCHC RBC-ENTMCNC: 35.8 GM/DL — SIGNIFICANT CHANGE UP (ref 32–36)
MCV RBC AUTO: 94.4 FL — SIGNIFICANT CHANGE UP (ref 80–100)
PHOSPHATE SERPL-MCNC: 3.9 MG/DL — SIGNIFICANT CHANGE UP (ref 2.5–4.5)
PLATELET # BLD AUTO: 295 K/UL — SIGNIFICANT CHANGE UP (ref 150–400)
POTASSIUM SERPL-MCNC: 3.7 MMOL/L — SIGNIFICANT CHANGE UP (ref 3.5–5.3)
POTASSIUM SERPL-SCNC: 3.7 MMOL/L — SIGNIFICANT CHANGE UP (ref 3.5–5.3)
PROT SERPL-MCNC: 8.9 GM/DL — HIGH (ref 6–8.3)
RBC # BLD: 2.79 M/UL — LOW (ref 4.2–5.8)
RBC # FLD: 12 % — SIGNIFICANT CHANGE UP (ref 11–15)
SODIUM SERPL-SCNC: 138 MMOL/L — SIGNIFICANT CHANGE UP (ref 135–145)
WBC # BLD: 5 K/UL — SIGNIFICANT CHANGE UP (ref 3.8–10.5)
WBC # FLD AUTO: 5 K/UL — SIGNIFICANT CHANGE UP (ref 3.8–10.5)

## 2017-02-14 PROCEDURE — 88304 TISSUE EXAM BY PATHOLOGIST: CPT | Mod: 26

## 2017-02-14 RX ORDER — MORPHINE SULFATE 50 MG/1
2 CAPSULE, EXTENDED RELEASE ORAL ONCE
Qty: 0 | Refills: 0 | Status: DISCONTINUED | OUTPATIENT
Start: 2017-02-14 | End: 2017-02-14

## 2017-02-14 RX ORDER — SODIUM CHLORIDE 9 MG/ML
1000 INJECTION, SOLUTION INTRAVENOUS
Qty: 0 | Refills: 0 | Status: DISCONTINUED | OUTPATIENT
Start: 2017-02-14 | End: 2017-02-15

## 2017-02-14 RX ORDER — FLUCONAZOLE 150 MG/1
100 TABLET ORAL DAILY
Qty: 0 | Refills: 0 | Status: DISCONTINUED | OUTPATIENT
Start: 2017-02-14 | End: 2017-02-16

## 2017-02-14 RX ORDER — ONDANSETRON 8 MG/1
4 TABLET, FILM COATED ORAL EVERY 6 HOURS
Qty: 0 | Refills: 0 | Status: DISCONTINUED | OUTPATIENT
Start: 2017-02-14 | End: 2017-02-16

## 2017-02-14 RX ORDER — SODIUM CHLORIDE 9 MG/ML
3 INJECTION INTRAMUSCULAR; INTRAVENOUS; SUBCUTANEOUS EVERY 8 HOURS
Qty: 0 | Refills: 0 | Status: DISCONTINUED | OUTPATIENT
Start: 2017-02-14 | End: 2017-02-16

## 2017-02-14 RX ORDER — LISINOPRIL 2.5 MG/1
20 TABLET ORAL DAILY
Qty: 0 | Refills: 0 | Status: DISCONTINUED | OUTPATIENT
Start: 2017-02-14 | End: 2017-02-15

## 2017-02-14 RX ORDER — ACETAMINOPHEN 500 MG
650 TABLET ORAL EVERY 6 HOURS
Qty: 0 | Refills: 0 | Status: DISCONTINUED | OUTPATIENT
Start: 2017-02-14 | End: 2017-02-16

## 2017-02-14 RX ORDER — COLCHICINE 0.6 MG
0.6 TABLET ORAL DAILY
Qty: 0 | Refills: 0 | Status: DISCONTINUED | OUTPATIENT
Start: 2017-02-14 | End: 2017-02-16

## 2017-02-14 RX ORDER — HEPARIN SODIUM 5000 [USP'U]/ML
5000 INJECTION INTRAVENOUS; SUBCUTANEOUS EVERY 12 HOURS
Qty: 0 | Refills: 0 | Status: DISCONTINUED | OUTPATIENT
Start: 2017-02-14 | End: 2017-02-16

## 2017-02-14 RX ORDER — CYCLOBENZAPRINE HYDROCHLORIDE 10 MG/1
5 TABLET, FILM COATED ORAL THREE TIMES A DAY
Qty: 0 | Refills: 0 | Status: DISCONTINUED | OUTPATIENT
Start: 2017-02-14 | End: 2017-02-16

## 2017-02-14 RX ORDER — MORPHINE SULFATE 50 MG/1
2 CAPSULE, EXTENDED RELEASE ORAL EVERY 4 HOURS
Qty: 0 | Refills: 0 | Status: DISCONTINUED | OUTPATIENT
Start: 2017-02-14 | End: 2017-02-16

## 2017-02-14 RX ADMIN — MORPHINE SULFATE 2 MILLIGRAM(S): 50 CAPSULE, EXTENDED RELEASE ORAL at 14:22

## 2017-02-14 RX ADMIN — SODIUM CHLORIDE 75 MILLILITER(S): 9 INJECTION, SOLUTION INTRAVENOUS at 22:01

## 2017-02-14 RX ADMIN — AMPICILLIN SODIUM AND SULBACTAM SODIUM 200 GRAM(S): 250; 125 INJECTION, POWDER, FOR SUSPENSION INTRAMUSCULAR; INTRAVENOUS at 05:31

## 2017-02-14 RX ADMIN — LISINOPRIL 20 MILLIGRAM(S): 2.5 TABLET ORAL at 05:32

## 2017-02-14 RX ADMIN — MORPHINE SULFATE 2 MILLIGRAM(S): 50 CAPSULE, EXTENDED RELEASE ORAL at 14:05

## 2017-02-14 RX ADMIN — MORPHINE SULFATE 2 MILLIGRAM(S): 50 CAPSULE, EXTENDED RELEASE ORAL at 20:13

## 2017-02-14 RX ADMIN — SODIUM CHLORIDE 75 MILLILITER(S): 9 INJECTION, SOLUTION INTRAVENOUS at 14:08

## 2017-02-14 RX ADMIN — SODIUM CHLORIDE 3 MILLILITER(S): 9 INJECTION INTRAMUSCULAR; INTRAVENOUS; SUBCUTANEOUS at 21:58

## 2017-02-14 RX ADMIN — MORPHINE SULFATE 2 MILLIGRAM(S): 50 CAPSULE, EXTENDED RELEASE ORAL at 20:43

## 2017-02-14 NOTE — PROGRESS NOTE ADULT - SUBJECTIVE AND OBJECTIVE BOX
I have seen and examined the patient and fatimah with the surgical PA's note.  The patient is to have skin grafting today.    Dr. Paras Diego contact information [phone]

## 2017-02-14 NOTE — PROGRESS NOTE ADULT - SUBJECTIVE AND OBJECTIVE BOX
Patient seen and examined at bedside with no complaints. Denies abdominal pain, n/v. +flatus/BM. Denies fever, chills, chest pain, sob.     Vital Signs Last 24 Hrs  T(F): 98.8, Max: 99 (02-13 @ 17:13)  HR: 79  BP: 128/87  RR: 18  SpO2: 100%    GENERAL: Alert, oriented, NAD  CHEST/LUNG: Respirations nonlabored  HEART: S1S2, Regular rate and rhythm  ABDOMEN: + Bowel sounds, soft. Dressing over abdominal wound c/d/i. Nontender, nondistended.   EXTREMITIES:  no calf tenderness, no edema b/l     New labs pending    Impression: 56 year old male a/w fevers s/p I&D of infected hematoma (1/23/17) with same day return to OR for hemostasis, s/p wound debridement and wound vac placement on 1/27/2017, s/p wound vac exchange on 1/30/17     Plan:  - scheduled for split thickness skin grafting today by Dr. Whitaker; pt NPO for procedure  - c/w IV abx for now  - f/u AM labs  - c/w incentive spirometer, SCDs, DVT ppx, OOB to ambulate  - pain management PRN   - c/w all medical management  - will d/w attending Patient seen and examined at bedside with no complaints. Denies abdominal pain, n/v. +flatus/BM. Denies fever, chills, chest pain, sob.     Vital Signs Last 24 Hrs  T(F): 98.8, Max: 99 (02-13 @ 17:13)  HR: 79  BP: 128/87  RR: 18  SpO2: 100%    GENERAL: Alert, oriented, NAD  CHEST/LUNG: Respirations nonlabored  HEART: S1S2, Regular rate and rhythm  ABDOMEN: + Bowel sounds, soft. Dressing over abdominal wound c/d/i. Nontender, nondistended.   EXTREMITIES:  no calf tenderness, no edema b/l     New labs pending    Impression: 56 year old male a/w fevers s/p I&D of infected hematoma (1/23/17) with same day return to OR for hemostasis, s/p wound debridement and wound vac placement on 1/27/2017, s/p wound vac exchange on 1/30/17     Plan:  - scheduled for split thickness skin grafting today by Dr. Whitaker; pt NPO for procedure  - c/w IV abx for now, per ID will switch to PO levaquin x 1 wk ~24 hr postop   - f/u AM labs  - c/w incentive spirometer, SCDs, DVT ppx, OOB to ambulate  - pain management PRN   - c/w all medical management  - will d/w attending Patient seen and examined at bedside with no complaints. Denies abdominal pain, n/v. +flatus/BM. Denies fever, chills, chest pain, sob.     Vital Signs Last 24 Hrs  T(F): 98.8, Max: 99 (02-13 @ 17:13)  HR: 79  BP: 128/87  RR: 18  SpO2: 100%    GENERAL: Alert, oriented, NAD  CHEST/LUNG: Respirations nonlabored  HEART: S1S2, Regular rate and rhythm  ABDOMEN: + Bowel sounds, soft. Dressing over abdominal wound c/d/i. Nontender, nondistended.   EXTREMITIES:  no calf tenderness, no edema b/l     New labs pending    Impression: 56 year old male a/w fevers s/p I&D of infected hematoma (1/23/17) with same day return to OR for hemostasis, s/p wound debridement and wound vac placement on 1/27/2017, s/p wound vac exchange on 1/30/17     Plan:  - scheduled for split thickness skin grafting today by Dr. Whitaker; pt NPO for procedure  - c/w IV abx for now, per ID will switch to PO levaquin x 1 wk ~24 hr postop   - f/u AM labs  - c/w incentive spirometer, SCDs, DVT ppx, OOB to ambulate  - pain management PRN   - c/w all medical management  - will evaluate pt post-operatively  - will d/w attending

## 2017-02-15 LAB — SURGICAL PATHOLOGY FINAL REPORT - CH: SIGNIFICANT CHANGE UP

## 2017-02-15 RX ORDER — COLCHICINE 0.6 MG
1 TABLET ORAL
Qty: 15 | Refills: 0 | OUTPATIENT
Start: 2017-02-15

## 2017-02-15 RX ORDER — FLUCONAZOLE 150 MG/1
1 TABLET ORAL
Qty: 7 | Refills: 0 | OUTPATIENT
Start: 2017-02-15

## 2017-02-15 RX ORDER — CIPROFLOXACIN LACTATE 400MG/40ML
1 VIAL (ML) INTRAVENOUS
Qty: 4 | Refills: 0 | OUTPATIENT
Start: 2017-02-15

## 2017-02-15 RX ORDER — IBUPROFEN 200 MG
0 TABLET ORAL
Qty: 0 | Refills: 0 | COMMUNITY

## 2017-02-15 RX ADMIN — SODIUM CHLORIDE 3 MILLILITER(S): 9 INJECTION INTRAMUSCULAR; INTRAVENOUS; SUBCUTANEOUS at 06:06

## 2017-02-15 RX ADMIN — Medication 0.6 MILLIGRAM(S): at 11:39

## 2017-02-15 RX ADMIN — LISINOPRIL 20 MILLIGRAM(S): 2.5 TABLET ORAL at 06:17

## 2017-02-15 RX ADMIN — MORPHINE SULFATE 2 MILLIGRAM(S): 50 CAPSULE, EXTENDED RELEASE ORAL at 06:32

## 2017-02-15 RX ADMIN — SODIUM CHLORIDE 3 MILLILITER(S): 9 INJECTION INTRAMUSCULAR; INTRAVENOUS; SUBCUTANEOUS at 13:41

## 2017-02-15 RX ADMIN — MORPHINE SULFATE 2 MILLIGRAM(S): 50 CAPSULE, EXTENDED RELEASE ORAL at 06:17

## 2017-02-15 RX ADMIN — HEPARIN SODIUM 5000 UNIT(S): 5000 INJECTION INTRAVENOUS; SUBCUTANEOUS at 17:13

## 2017-02-15 RX ADMIN — HEPARIN SODIUM 5000 UNIT(S): 5000 INJECTION INTRAVENOUS; SUBCUTANEOUS at 06:17

## 2017-02-15 RX ADMIN — FLUCONAZOLE 100 MILLIGRAM(S): 150 TABLET ORAL at 11:39

## 2017-02-15 RX ADMIN — SODIUM CHLORIDE 3 MILLILITER(S): 9 INJECTION INTRAMUSCULAR; INTRAVENOUS; SUBCUTANEOUS at 21:30

## 2017-02-15 NOTE — DISCHARGE NOTE ADULT - NS AS ACTIVITY OBS
Do not make important decisions/No Heavy lifting/straining/Showering allowed/Do not drive or operate machinery

## 2017-02-15 NOTE — DISCHARGE NOTE ADULT - CARE PROVIDER_API CALL
Davy Whitaekr (MD), Plastic Surgery  [address] [id number]  Phone: [phone]  Fax: [phone] Davy Whitaker (MD), Plastic Surgery  [address] [facility] [address]  [place], [place] [id number]  Phone: [phone]  Fax: [phone]    Sobia Smith (ARA), Infectious Disease; Internal Medicine  [address] [place] [address]  Phone: [phone]  Fax: [phone]    Paras Diego), Surgery  [address] [place] [address]  Phone: [phone]  Fax: [phone]

## 2017-02-15 NOTE — DISCHARGE NOTE ADULT - PATIENT PORTAL LINK FT
“You can access the [facility] Patient Portal, offered by [facility], by registering with the following website: [email or web address]”

## 2017-02-15 NOTE — PROGRESS NOTE ADULT - PROBLEM SELECTOR PLAN 1
resolved  CT had shown rectus muscle hematoma s/p IR guided drainage to evaluate for  infection  c/s stayed negative  On Unasyn    C/S so far negative-f/u  Cont Unasyn and diflucan
resolving  CT had shown rectus muscle hematoma s/p IR guided drainage to evaluate for  infection  C/S so far negative-f/u  Cont Unasyn and diflucan
resolving  CT had shown rectus muscle hematoma s/p IR guided drainage to evaluate for  infection  c/s stayed negative    C/S so far negative-f/u  Cont Unasyn and diflucan
s/p aspiration of left and right rectus abdominus hematomas with possible infeciton though the cultures eventually stayed negative
cont unasyn  Add diflucan as c/s this time growing yeast

## 2017-02-15 NOTE — PROGRESS NOTE ADULT - SUBJECTIVE AND OBJECTIVE BOX
Patient surgically stable for discharge. Discussed discharge with patient and he would like to stay one more night and will go in the morning. Patient is concerned because his lips have been swollen all day. Patient states that he has no difficulty breathing and that he thinks the swelling is going down a little at this time. Will discontinue ACE inhibitor and monitor blood pressure on HCTZ. Monitor for swelling and difficulty breathing.     Vital Signs Last 24 Hrs  T(C): 37.1, Max: 37.1 (02-14 @ 22:00)  T(F): 98.8, Max: 98.8 (02-14 @ 22:00)  HR: 96 (84 - 100)  BP: 110/74 (108/73 - 161/91)  RR: 17 (14 - 17)  SpO2: 100% (98% - 100%)

## 2017-02-15 NOTE — PROGRESS NOTE ADULT - SUBJECTIVE AND OBJECTIVE BOX
Patient is a 56y old  Male who presents with a chief complaint of Fever 101 degrees F (15 Feb 2017 10:34)      INTERVAL HPI / OVERNIGHT EVENTS:doing ok .s/p skin graft yesterday    MEDICATIONS  (STANDING):  heparin  Injectable 5000Unit(s) SubCutaneous every 12 hours  fluconAZOLE   Tablet 100milliGRAM(s) Oral daily  colchicine 0.6milliGRAM(s) Oral daily  hydrochlorothiazide   Tablet 50milliGRAM(s) Oral daily  sodium chloride 0.9% lock flush 3milliLiter(s) IV Push every 8 hours  levoFLOXacin  Tablet 750milliGRAM(s) Oral every 48 hours    MEDICATIONS  (PRN):  acetaminophen   Tablet 650milliGRAM(s) Oral every 6 hours PRN For Temp greater than 38 C (100.4 F)  cyclobenzaprine 5milliGRAM(s) Oral three times a day PRN Muscle Spasm  ondansetron Injectable 4milliGRAM(s) IV Push every 6 hours PRN Nausea and/or Vomiting  morphine  - Injectable 2milliGRAM(s) IV Push every 4 hours PRN Moderate Pain (4 - 6)      Vital Signs Last 24 Hrs  Tmax-afebrile    PHYSICAL EXAM:    Constitutional: NAD, well-groomed, well-developed  Respiratory: CTAB/L  Cardiovascular: S1 and S2, RRR, no M/G/R  Gastrointestinal: BS+, soft, NT/ND  Extremities: No peripheral edema  Vascular: 2+ peripheral pulses  Skin: No rashes      LABS:                            MICROBIOLOGY:  RECENT CULTURES:        RADIOLOGY & ADDITIONAL STUDIES:

## 2017-02-15 NOTE — DISCHARGE NOTE ADULT - PLAN OF CARE
s/p skin graft over old site Drink plenty of fluids to prevent constipation and fruit juices without pulp that are high in vitamin C. Rest as needed. Do not lift anything heavier than 10 pounds. You may take motrin or advil for mild pain as needed, in addition to prescribed narcotic medication. Call for any fever over 101, nausea, vomiting, severe pain, no passing of gas or bowel movement. Please follow up with Dr. Whitaker. Please take Levaquin 750mg by mouth every other day for one week. no further fevers Please take Levaquin 750mg by mouth every other day for one week. Continue Diflucan for one week. control of blood pressure Please continue to take home blood pressure medication at higher dose. Please follow up with your PMD. Resolution of infection Please continue to take Diflucan for one week. continue albuterol as needed pain relief Take colchicine daily. Follow up with your PMD Drink plenty of fluids to prevent constipation and fruit juices without pulp that are high in vitamin C. Rest as needed. Do not lift anything heavier than 10 pounds. You may take motrin or advil for mild pain as needed, in addition to prescribed narcotic medication. Call for any fever over 101, nausea, vomiting, severe pain, no passing of gas or bowel movement. Please follow up with Dr. Whitaker in one week from Monday 2/27/17. Please keep wound clean and dry and wrapped as is. Please do not remove dressing. Please continue to take home blood pressure medication. Please follow up with your PMD. Do not take Lisinopril any longer secondary to angiodema (lip swelling). Drink plenty of fluids to prevent constipation and fruit juices without pulp that are high in vitamin C. Rest as needed. Do not lift anything heavier than 10 pounds. You may take motrin or advil for mild pain as needed, in addition to prescribed narcotic medication. Call for any fever over 101, nausea, vomiting, severe pain, no passing of gas or bowel movement. Please follow up with Dr. Whitaker in one week from Monday 2/27/17. Please keep wound clean and dry and wrapped as is. Please do not remove dressing.  Please call dr. Whitaker's office with any questions. Do not take Lisinopril any longer secondary to angiodema (lip swelling).  Take hydrochlorothiazide 50mg daily. Please follow up with your PMD.

## 2017-02-15 NOTE — DISCHARGE NOTE ADULT - MEDICATION SUMMARY - MEDICATIONS TO TAKE
I will START or STAY ON the medications listed below when I get home from the hospital:    Percocet 5/325 325 mg-5 mg oral tablet  -- 1 tab(s) by mouth every 6 hours, As Needed MDD:4 - for severe pain  -- Caution federal law prohibits the transfer of this drug to any person other  than the person for whom it was prescribed.  May cause drowsiness.  Alcohol may intensify this effect.  Use care when operating dangerous machinery.  This prescription cannot be refilled.  This product contains acetaminophen.  Do not use  with any other product containing acetaminophen to prevent possible liver damage.  Using more of this medication than prescribed may cause serious breathing problems.    -- Indication: For Pain    fluconazole 100 mg oral tablet  -- 1 tab(s) by mouth once a day  -- Indication: For yeast    colchicine 0.6 mg oral tablet  -- 1 tab(s) by mouth once a day  -- Indication: For gout    lisinopril-hydroCHLOROthiazide 20mg-25mg oral tablet  --  by mouth   -- Indication: For Hypertension    albuterol  --  inhaled   -- Indication: For Asthma    cyclobenzaprine 5 mg oral tablet  -- 1 tab(s) by mouth 3 times a day  -- Indication: For Muscle spasm    levoFLOXacin 750 mg oral tablet  -- 1 tab(s) by mouth every 48 hours  -- Indication: For Fever, unspecified fever cause

## 2017-02-15 NOTE — PROGRESS NOTE ADULT - PROBLEM SELECTOR PLAN 2
c/s this time neg  Cont unasyn emperically
c/s this time neg  Cont unasyn emperically  Once afebrile switch to po levaquin for 1 more weeks
c/s this time neg  Cont unasyn emperically  Once afebrile switch to po levaquin for 1 more weeks
s/p skin graft by dr Carnes on tuesday  D/C pt lexx on levaquin + diflucan for 1 week emperically   F/U with Dr Carnes and surgery
c/s this time neg  Cont unasyn emperically

## 2017-02-15 NOTE — DISCHARGE NOTE ADULT - CARE PLAN
Principal Discharge DX:	Abdominal abscess  Goal:	s/p skin graft over old site  Instructions for follow-up, activity and diet:	Drink plenty of fluids to prevent constipation and fruit juices without pulp that are high in vitamin C. Rest as needed. Do not lift anything heavier than 10 pounds. You may take motrin or advil for mild pain as needed, in addition to prescribed narcotic medication. Call for any fever over 101, nausea, vomiting, severe pain, no passing of gas or bowel movement. Please follow up with Dr. Whitaker. Please take Levaquin 750mg by mouth every other day for one week.  Secondary Diagnosis:	Fever, unspecified fever cause  Goal:	no further fevers  Instructions for follow-up, activity and diet:	Please take Levaquin 750mg by mouth every other day for one week. Continue Diflucan for one week.  Secondary Diagnosis:	Hypertension  Goal:	control of blood pressure  Instructions for follow-up, activity and diet:	Please continue to take home blood pressure medication at higher dose. Please follow up with your PMD.  Secondary Diagnosis:	Asthma  Secondary Diagnosis:	Candida infection  Goal:	Resolution of infection  Instructions for follow-up, activity and diet:	Please continue to take Diflucan for one week. Principal Discharge DX:	Abdominal abscess  Goal:	s/p skin graft over old site  Instructions for follow-up, activity and diet:	Drink plenty of fluids to prevent constipation and fruit juices without pulp that are high in vitamin C. Rest as needed. Do not lift anything heavier than 10 pounds. You may take motrin or advil for mild pain as needed, in addition to prescribed narcotic medication. Call for any fever over 101, nausea, vomiting, severe pain, no passing of gas or bowel movement. Please follow up with Dr. Whitaker in one week from Monday 2/27/17. Please keep wound clean and dry and wrapped as is. Please do not remove dressing.  Secondary Diagnosis:	Fever, unspecified fever cause  Goal:	no further fevers  Instructions for follow-up, activity and diet:	Please take Levaquin 750mg by mouth every other day for one week. Continue Diflucan for one week.  Secondary Diagnosis:	Hypertension  Goal:	control of blood pressure  Instructions for follow-up, activity and diet:	Please continue to take home blood pressure medication. Please follow up with your PMD.  Secondary Diagnosis:	Asthma  Instructions for follow-up, activity and diet:	continue albuterol as needed  Secondary Diagnosis:	Candida infection  Goal:	Resolution of infection  Instructions for follow-up, activity and diet:	Please continue to take Diflucan for one week.  Secondary Diagnosis:	Gout  Goal:	pain relief  Instructions for follow-up, activity and diet:	Take colchicine daily. Follow up with your PMD Principal Discharge DX:	Abdominal abscess  Goal:	s/p skin graft over old site  Instructions for follow-up, activity and diet:	Drink plenty of fluids to prevent constipation and fruit juices without pulp that are high in vitamin C. Rest as needed. Do not lift anything heavier than 10 pounds. You may take motrin or advil for mild pain as needed, in addition to prescribed narcotic medication. Call for any fever over 101, nausea, vomiting, severe pain, no passing of gas or bowel movement. Please follow up with Dr. Whitaker in one week from Monday 2/27/17. Please keep wound clean and dry and wrapped as is. Please do not remove dressing.  Secondary Diagnosis:	Fever, unspecified fever cause  Goal:	no further fevers  Instructions for follow-up, activity and diet:	Please take Levaquin 750mg by mouth every other day for one week. Continue Diflucan for one week.  Secondary Diagnosis:	Hypertension  Goal:	control of blood pressure  Instructions for follow-up, activity and diet:	Do not take Lisinopril any longer secondary to angiodema (lip swelling).  Secondary Diagnosis:	Asthma  Instructions for follow-up, activity and diet:	continue albuterol as needed  Secondary Diagnosis:	Candida infection  Goal:	Resolution of infection  Instructions for follow-up, activity and diet:	Please continue to take Diflucan for one week.  Secondary Diagnosis:	Gout  Goal:	pain relief  Instructions for follow-up, activity and diet:	Take colchicine daily. Follow up with your PMD Principal Discharge DX:	Abdominal abscess  Goal:	s/p skin graft over old site  Instructions for follow-up, activity and diet:	Drink plenty of fluids to prevent constipation and fruit juices without pulp that are high in vitamin C. Rest as needed. Do not lift anything heavier than 10 pounds. You may take motrin or advil for mild pain as needed, in addition to prescribed narcotic medication. Call for any fever over 101, nausea, vomiting, severe pain, no passing of gas or bowel movement. Please follow up with Dr. Whitaker in one week from Monday 2/27/17. Please keep wound clean and dry and wrapped as is. Please do not remove dressing.  Please call dr. Whitaker's office with any questions.  Secondary Diagnosis:	Fever, unspecified fever cause  Goal:	no further fevers  Instructions for follow-up, activity and diet:	Please take Levaquin 750mg by mouth every other day for one week. Continue Diflucan for one week.  Secondary Diagnosis:	Hypertension  Goal:	control of blood pressure  Instructions for follow-up, activity and diet:	Do not take Lisinopril any longer secondary to angiodema (lip swelling).  Take hydrochlorothiazide 50mg daily. Please follow up with your PMD.  Secondary Diagnosis:	Asthma  Instructions for follow-up, activity and diet:	continue albuterol as needed  Secondary Diagnosis:	Candida infection  Goal:	Resolution of infection  Instructions for follow-up, activity and diet:	Please continue to take Diflucan for one week.  Secondary Diagnosis:	Gout  Goal:	pain relief  Instructions for follow-up, activity and diet:	Take colchicine daily. Follow up with your PMD

## 2017-02-15 NOTE — DISCHARGE NOTE ADULT - HOSPITAL COURSE
56 year old male a/w fevers s/p I&D of infected hematoma (1/23/17) with same day return to OR for hemostasis, s/p wound debridement and wound vac placement on 1/27/2017, s/p wound vac exchange on 1/30/17, S/p skin graft 2/14. Patient is cleared for discharge on 2/15 on POD#1. Patient advised to take Levaquin and Diflucan for one week following discharge and to follow up with ID and Dr. Whitaker. Leave dressing in place and keep clean and dry. 56 year old male a/w fevers s/p I&D of infected hematoma (1/23/17) with same day return to OR for hemostasis, s/p wound debridement and wound vac placement on 1/27/2017, s/p wound vac exchange on 1/30/17, S/p skin graft 2/14. Patient is cleared for discharge on 2/16 on POD#2. Patient advised to take Levaquin and Diflucan for one week following discharge and to follow up with ID and Dr. Whitaker. Leave dressing in place and keep clean and dry. Patient developed angioedema on 2/15 and lisinopril was discontinued.

## 2017-02-15 NOTE — DISCHARGE NOTE ADULT - ADDITIONAL INSTRUCTIONS
Please follow up with Dr. Whitaker Please follow up with Dr. Whitaker on 2/27/17. Please call his office for an appointment.

## 2017-02-15 NOTE — PROGRESS NOTE ADULT - PROBLEM SELECTOR PROBLEM 2
MSSA infection, non-invasive

## 2017-02-15 NOTE — PROGRESS NOTE ADULT - SUBJECTIVE AND OBJECTIVE BOX
Patient seen and examined bedside resting comfortably.  No complaints offered.   Abdominal pain is well controlled.  Denies nausea, vomiting. Tolerating diet.  Denies chest pain, dyspnea, cough.    Vital Signs Last 24 Hrs  T(C): 36.9, Max: 37.1 (02-14 @ 22:00)  T(F): 98.4, Max: 98.8 (02-14 @ 22:00)  HR: 84 (84 - 102)  BP: 108/73 (108/73 - 161/91)  BP(mean): --  RR: 15 (13 - 21)  SpO2: 100% (98% - 100%)  I&O's Summary    I & Os for current day (as of 15 Feb 2017 07:30)  =============================================  IN: 1475 ml / OUT: 400 ml / NET: 1075 ml      PHYSICAL EXAM:  GENERAL: Alert, oriented, NAD  CHEST/LUNG: Respirations nonlabored  HEART: S1S2, Regular rate and rhythm  ABDOMEN: + Bowel sounds, soft. Dressing over abdominal wound c/d/i. Nontender, nondistended.   EXTREMITIES:  no calf tenderness, no edema b/l             Assessment:56yMaleImpression: 56 year old male a/w fevers s/p I&D of infected hematoma (1/23/17) with same day return to OR for hemostasis, s/p wound debridement and wound vac placement on 1/27/2017, s/p wound vac exchange on 1/30/17, S/p skin graft 2/14    Plan:  - c/w IV abx for now, per ID will switch to PO levaquin x 1 wk ~24 hr postop   - f/u AM labs  - c/w incentive spirometer, SCDs, DVT ppx, OOB to ambulate  - pain management PRN   - c/w all medical management  - d/c planning  - will d/w attending

## 2017-02-15 NOTE — DISCHARGE NOTE ADULT - CARE PROVIDERS DIRECT ADDRESSES
,DirectAddress_Unknown,[email or web address] ,DirectAddress_Unknown,DirectAddress_Unknown,[email or web address]

## 2017-02-16 ENCOUNTER — TRANSCRIPTION ENCOUNTER (OUTPATIENT)
Age: 57
End: 2017-02-16

## 2017-02-16 VITALS
TEMPERATURE: 99 F | SYSTOLIC BLOOD PRESSURE: 119 MMHG | RESPIRATION RATE: 16 BRPM | HEART RATE: 101 BPM | OXYGEN SATURATION: 99 % | DIASTOLIC BLOOD PRESSURE: 80 MMHG

## 2017-02-16 LAB
ALBUMIN SERPL ELPH-MCNC: 3.2 G/DL — LOW (ref 3.3–5)
ALP SERPL-CCNC: 77 U/L — SIGNIFICANT CHANGE UP (ref 40–120)
ALT FLD-CCNC: 27 U/L — SIGNIFICANT CHANGE UP (ref 12–78)
ANION GAP SERPL CALC-SCNC: 9 MMOL/L — SIGNIFICANT CHANGE UP (ref 5–17)
AST SERPL-CCNC: 24 U/L — SIGNIFICANT CHANGE UP (ref 15–37)
BILIRUB DIRECT SERPL-MCNC: 0.11 MG/DL — SIGNIFICANT CHANGE UP (ref 0.05–0.2)
BILIRUB INDIRECT FLD-MCNC: 0.4 MG/DL — SIGNIFICANT CHANGE UP (ref 0.2–1)
BILIRUB SERPL-MCNC: 0.5 MG/DL — SIGNIFICANT CHANGE UP (ref 0.2–1.2)
BUN SERPL-MCNC: 24 MG/DL — HIGH (ref 7–23)
CALCIUM SERPL-MCNC: 8.9 MG/DL — SIGNIFICANT CHANGE UP (ref 8.5–10.1)
CHLORIDE SERPL-SCNC: 100 MMOL/L — SIGNIFICANT CHANGE UP (ref 96–108)
CO2 SERPL-SCNC: 28 MMOL/L — SIGNIFICANT CHANGE UP (ref 22–31)
CREAT SERPL-MCNC: 1.54 MG/DL — HIGH (ref 0.5–1.3)
GLUCOSE SERPL-MCNC: 110 MG/DL — HIGH (ref 70–99)
HCT VFR BLD CALC: 24.6 % — LOW (ref 39–50)
HGB BLD-MCNC: 8.8 G/DL — LOW (ref 13–17)
MAGNESIUM SERPL-MCNC: 2 MG/DL — SIGNIFICANT CHANGE UP (ref 1.8–2.4)
MCHC RBC-ENTMCNC: 33.9 PG — SIGNIFICANT CHANGE UP (ref 27–34)
MCHC RBC-ENTMCNC: 35.5 GM/DL — SIGNIFICANT CHANGE UP (ref 32–36)
MCV RBC AUTO: 95.5 FL — SIGNIFICANT CHANGE UP (ref 80–100)
PHOSPHATE SERPL-MCNC: 3.6 MG/DL — SIGNIFICANT CHANGE UP (ref 2.5–4.5)
PLATELET # BLD AUTO: 254 K/UL — SIGNIFICANT CHANGE UP (ref 150–400)
POTASSIUM SERPL-MCNC: 4.2 MMOL/L — SIGNIFICANT CHANGE UP (ref 3.5–5.3)
POTASSIUM SERPL-SCNC: 4.2 MMOL/L — SIGNIFICANT CHANGE UP (ref 3.5–5.3)
PROT SERPL-MCNC: 8.5 GM/DL — HIGH (ref 6–8.3)
RBC # BLD: 2.58 M/UL — LOW (ref 4.2–5.8)
RBC # FLD: 11.8 % — SIGNIFICANT CHANGE UP (ref 11–15)
SODIUM SERPL-SCNC: 137 MMOL/L — SIGNIFICANT CHANGE UP (ref 135–145)
WBC # BLD: 5.6 K/UL — SIGNIFICANT CHANGE UP (ref 3.8–10.5)
WBC # FLD AUTO: 5.6 K/UL — SIGNIFICANT CHANGE UP (ref 3.8–10.5)

## 2017-02-16 RX ADMIN — SODIUM CHLORIDE 3 MILLILITER(S): 9 INJECTION INTRAMUSCULAR; INTRAVENOUS; SUBCUTANEOUS at 13:03

## 2017-02-16 RX ADMIN — FLUCONAZOLE 100 MILLIGRAM(S): 150 TABLET ORAL at 11:32

## 2017-02-16 RX ADMIN — SODIUM CHLORIDE 3 MILLILITER(S): 9 INJECTION INTRAMUSCULAR; INTRAVENOUS; SUBCUTANEOUS at 05:32

## 2017-02-16 RX ADMIN — HEPARIN SODIUM 5000 UNIT(S): 5000 INJECTION INTRAVENOUS; SUBCUTANEOUS at 05:34

## 2017-02-16 RX ADMIN — Medication 0.6 MILLIGRAM(S): at 11:32

## 2017-02-16 NOTE — PROGRESS NOTE ADULT - SUBJECTIVE AND OBJECTIVE BOX
Patient seen and examined at bedside with no complaints. Admits to flatus/BM. Denies pain, nasuea/vomiting. Tolerating diet.  Angiodema resolving. Denies difficulty breathing and SOB, denies tongue swelling.     Vital Signs Last 24 Hrs  T(F): 99, Max: 99.8 (02-15 @ 17:29)  HR: 99  BP: 119/81  RR: 18  SpO2: 100%    GENERAL: Alert, NAD  HEENT: edema o upper lip still present.   CHEST/LUNG: Clear to auscultation bilaterally, respirations nonlabored  HEART: S1S2, Regular rate and rhythm;   ABDOMEN: + Bowel sounds, soft, Nontender, Nondistended. Abdominal binder in place, dressing clean dry and intact.   EXTREMITIES:  no calf tenderness, No edema. Right thigh dressing clean dry and intact.     I&O's Detail    I & Os for current day (as of 16 Feb 2017 08:53)  =============================================  IN:    Oral Fluid: 360 ml    Total IN: 360 ml  ---------------------------------------------  OUT:    Total OUT: 0 ml  ---------------------------------------------  Total NET: 360 ml      LABS:                        8.8    5.6   )-----------( 254      ( 16 Feb 2017 07:34 )             24.6     16 Feb 2017 07:34    137    |  100    |  24     ----------------------------<  110    4.2     |  28     |  1.54     Ca    8.9        16 Feb 2017 07:34  Phos  3.6       16 Feb 2017 07:34  Mg     2.0       16 Feb 2017 07:34    TPro  8.5    /  Alb  3.2    /  TBili  0.5    /  DBili  .11    /  AST  24     /  ALT  27     /  AlkPhos  77     16 Feb 2017 07:34    Assessment:56yMaleImpression: 56 year old male a/w fevers s/p I&D of infected hematoma (1/23/17) with same day return to OR for hemostasis, s/p wound debridement and wound vac placement on 1/27/2017, s/p wound vac exchange on 1/30/17, S/p skin graft 2/14    Plan:  - discharge planning today.   - monitor BP for possible alternative therapy for Lisinopril  - outpatient follow up with Dr. Whitaker on 2/27.   - Will discuss with surgical attending.

## 2017-02-16 NOTE — PROGRESS NOTE ADULT - PROVIDER SPECIALTY LIST ADULT
Infectious Disease
Plastic Surgery
Surgery

## 2017-02-19 DIAGNOSIS — D72.829 ELEVATED WHITE BLOOD CELL COUNT, UNSPECIFIED: ICD-10-CM

## 2017-02-19 DIAGNOSIS — I10 ESSENTIAL (PRIMARY) HYPERTENSION: ICD-10-CM

## 2017-02-19 DIAGNOSIS — T81.4XXA INFECTION FOLLOWING A PROCEDURE, INITIAL ENCOUNTER: ICD-10-CM

## 2017-02-19 DIAGNOSIS — S30.1XXA CONTUSION OF ABDOMINAL WALL, INITIAL ENCOUNTER: ICD-10-CM

## 2017-02-19 DIAGNOSIS — R50.81 FEVER PRESENTING WITH CONDITIONS CLASSIFIED ELSEWHERE: ICD-10-CM

## 2017-02-19 DIAGNOSIS — Z79.1 LONG TERM (CURRENT) USE OF NON-STEROIDAL ANTI-INFLAMMATORIES (NSAID): ICD-10-CM
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
## 2017-02-19 DIAGNOSIS — M10.9 GOUT, UNSPECIFIED: ICD-10-CM

## 2017-02-19 DIAGNOSIS — T46.4X5A ADVERSE EFFECT OF ANGIOTENSIN-CONVERTING-ENZYME INHIBITORS, INITIAL ENCOUNTER: ICD-10-CM

## 2017-02-19 DIAGNOSIS — T78.3XXA ANGIONEUROTIC EDEMA, INITIAL ENCOUNTER: ICD-10-CM

## 2017-02-19 DIAGNOSIS — Z79.51 LONG TERM (CURRENT) USE OF INHALED STEROIDS: ICD-10-CM

## 2017-02-19 DIAGNOSIS — J45.909 UNSPECIFIED ASTHMA, UNCOMPLICATED: ICD-10-CM

## 2017-02-19 DIAGNOSIS — L02.211 CUTANEOUS ABSCESS OF ABDOMINAL WALL: ICD-10-CM
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
## 2017-02-19 DIAGNOSIS — B95.61 METHICILLIN SUSCEPTIBLE STAPHYLOCOCCUS AUREUS INFECTION AS THE CAUSE OF DISEASES CLASSIFIED ELSEWHERE: ICD-10-CM

## 2017-02-19 DIAGNOSIS — B37.89 OTHER SITES OF CANDIDIASIS: ICD-10-CM
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
## 2017-02-19 DIAGNOSIS — Z28.21 IMMUNIZATION NOT CARRIED OUT BECAUSE OF PATIENT REFUSAL: ICD-10-CM

## 2017-02-19 DIAGNOSIS — R50.9 FEVER, UNSPECIFIED: ICD-10-CM

## 2017-02-27 ENCOUNTER — RESULT REVIEW (OUTPATIENT)
Age: 57
End: 2017-02-27

## 2017-02-27 ENCOUNTER — INPATIENT (INPATIENT)
Facility: HOSPITAL | Age: 57
LOS: 0 days | Discharge: HOME | End: 2017-02-28
Attending: FAMILY MEDICINE | Admitting: FAMILY MEDICINE
Payer: COMMERCIAL

## 2017-02-27 VITALS
WEIGHT: 130.07 LBS | TEMPERATURE: 98 F | OXYGEN SATURATION: 99 % | SYSTOLIC BLOOD PRESSURE: 96 MMHG | DIASTOLIC BLOOD PRESSURE: 70 MMHG | HEIGHT: 68 IN

## 2017-02-27 DIAGNOSIS — L02.211 CUTANEOUS ABSCESS OF ABDOMINAL WALL: ICD-10-CM

## 2017-02-27 DIAGNOSIS — N17.0 ACUTE KIDNEY FAILURE WITH TUBULAR NECROSIS: ICD-10-CM

## 2017-02-27 DIAGNOSIS — I10 ESSENTIAL (PRIMARY) HYPERTENSION: ICD-10-CM

## 2017-02-27 DIAGNOSIS — Z98.890 OTHER SPECIFIED POSTPROCEDURAL STATES: Chronic | ICD-10-CM

## 2017-02-27 DIAGNOSIS — S30.1XXA CONTUSION OF ABDOMINAL WALL, INITIAL ENCOUNTER: Chronic | ICD-10-CM

## 2017-02-27 LAB
ALBUMIN SERPL ELPH-MCNC: 3.8 G/DL — SIGNIFICANT CHANGE UP (ref 3.3–5)
ALP SERPL-CCNC: 71 U/L — SIGNIFICANT CHANGE UP (ref 40–120)
ALT FLD-CCNC: 24 U/L — SIGNIFICANT CHANGE UP (ref 12–78)
ANION GAP SERPL CALC-SCNC: 7 MMOL/L — SIGNIFICANT CHANGE UP (ref 5–17)
AST SERPL-CCNC: 23 U/L — SIGNIFICANT CHANGE UP (ref 15–37)
BILIRUB SERPL-MCNC: 0.3 MG/DL — SIGNIFICANT CHANGE UP (ref 0.2–1.2)
BUN SERPL-MCNC: 42 MG/DL — HIGH (ref 7–23)
CALCIUM SERPL-MCNC: 9.1 MG/DL — SIGNIFICANT CHANGE UP (ref 8.5–10.1)
CHLORIDE SERPL-SCNC: 103 MMOL/L — SIGNIFICANT CHANGE UP (ref 96–108)
CO2 SERPL-SCNC: 28 MMOL/L — SIGNIFICANT CHANGE UP (ref 22–31)
CREAT SERPL-MCNC: 2.36 MG/DL — HIGH (ref 0.5–1.3)
GLUCOSE SERPL-MCNC: 99 MG/DL — SIGNIFICANT CHANGE UP (ref 70–99)
HCT VFR BLD CALC: 27.3 % — LOW (ref 39–50)
HGB BLD-MCNC: 9.5 G/DL — LOW (ref 13–17)
MCHC RBC-ENTMCNC: 33 PG — SIGNIFICANT CHANGE UP (ref 27–34)
MCHC RBC-ENTMCNC: 34.9 GM/DL — SIGNIFICANT CHANGE UP (ref 32–36)
MCV RBC AUTO: 94.6 FL — SIGNIFICANT CHANGE UP (ref 80–100)
PLATELET # BLD AUTO: 171 K/UL — SIGNIFICANT CHANGE UP (ref 150–400)
POTASSIUM SERPL-MCNC: 4.6 MMOL/L — SIGNIFICANT CHANGE UP (ref 3.5–5.3)
POTASSIUM SERPL-SCNC: 4.6 MMOL/L — SIGNIFICANT CHANGE UP (ref 3.5–5.3)
PROT SERPL-MCNC: 8.8 GM/DL — HIGH (ref 6–8.3)
RBC # BLD: 2.89 M/UL — LOW (ref 4.2–5.8)
RBC # FLD: 11.5 % — SIGNIFICANT CHANGE UP (ref 11–15)
SODIUM SERPL-SCNC: 138 MMOL/L — SIGNIFICANT CHANGE UP (ref 135–145)
WBC # BLD: 4.2 K/UL — SIGNIFICANT CHANGE UP (ref 3.8–10.5)
WBC # FLD AUTO: 4.2 K/UL — SIGNIFICANT CHANGE UP (ref 3.8–10.5)

## 2017-02-27 PROCEDURE — 76770 US EXAM ABDO BACK WALL COMP: CPT | Mod: 26

## 2017-02-27 PROCEDURE — 88304 TISSUE EXAM BY PATHOLOGIST: CPT | Mod: 26

## 2017-02-27 PROCEDURE — 71010: CPT | Mod: 26

## 2017-02-27 PROCEDURE — 99285 EMERGENCY DEPT VISIT HI MDM: CPT

## 2017-02-27 PROCEDURE — 99223 1ST HOSP IP/OBS HIGH 75: CPT

## 2017-02-27 RX ORDER — AMPICILLIN SODIUM AND SULBACTAM SODIUM 250; 125 MG/ML; MG/ML
3 INJECTION, POWDER, FOR SUSPENSION INTRAMUSCULAR; INTRAVENOUS EVERY 6 HOURS
Qty: 0 | Refills: 0 | Status: DISCONTINUED | OUTPATIENT
Start: 2017-02-27 | End: 2017-02-28

## 2017-02-27 RX ORDER — AMPICILLIN SODIUM AND SULBACTAM SODIUM 250; 125 MG/ML; MG/ML
3 INJECTION, POWDER, FOR SUSPENSION INTRAMUSCULAR; INTRAVENOUS ONCE
Qty: 0 | Refills: 0 | Status: COMPLETED | OUTPATIENT
Start: 2017-02-27 | End: 2017-02-27

## 2017-02-27 RX ORDER — AMPICILLIN SODIUM AND SULBACTAM SODIUM 250; 125 MG/ML; MG/ML
INJECTION, POWDER, FOR SUSPENSION INTRAMUSCULAR; INTRAVENOUS
Qty: 0 | Refills: 0 | Status: DISCONTINUED | OUTPATIENT
Start: 2017-02-27 | End: 2017-02-28

## 2017-02-27 RX ORDER — FLUCONAZOLE 150 MG/1
100 TABLET ORAL DAILY
Qty: 0 | Refills: 0 | Status: DISCONTINUED | OUTPATIENT
Start: 2017-02-27 | End: 2017-02-28

## 2017-02-27 RX ORDER — SODIUM CHLORIDE 9 MG/ML
1000 INJECTION, SOLUTION INTRAVENOUS
Qty: 0 | Refills: 0 | Status: DISCONTINUED | OUTPATIENT
Start: 2017-02-27 | End: 2017-02-28

## 2017-02-27 RX ORDER — COLCHICINE 0.6 MG
0.6 TABLET ORAL DAILY
Qty: 0 | Refills: 0 | Status: DISCONTINUED | OUTPATIENT
Start: 2017-02-27 | End: 2017-02-28

## 2017-02-27 RX ADMIN — SODIUM CHLORIDE 75 MILLILITER(S): 9 INJECTION, SOLUTION INTRAVENOUS at 20:53

## 2017-02-27 RX ADMIN — AMPICILLIN SODIUM AND SULBACTAM SODIUM 200 GRAM(S): 250; 125 INJECTION, POWDER, FOR SUSPENSION INTRAMUSCULAR; INTRAVENOUS at 15:03

## 2017-02-27 RX ADMIN — AMPICILLIN SODIUM AND SULBACTAM SODIUM 200 GRAM(S): 250; 125 INJECTION, POWDER, FOR SUSPENSION INTRAMUSCULAR; INTRAVENOUS at 22:09

## 2017-02-27 RX ADMIN — Medication 0.6 MILLIGRAM(S): at 22:10

## 2017-02-27 RX ADMIN — FLUCONAZOLE 100 MILLIGRAM(S): 150 TABLET ORAL at 22:10
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
## 2017-02-27 NOTE — CONSULT NOTE ADULT - PROBLEM SELECTOR RECOMMENDATION 9
s/p skin graft reconstruction(2/17) of the previous defect from I & D was done on the infected hematoma in January.  Plan is for debridement and closure of wound  will start Unasyn emperically while pt undergoes surgery   FU on routine labs

## 2017-02-27 NOTE — ED ADULT NURSE NOTE - OBJECTIVE STATEMENT
received patient alert and orient no distress. patient came in for suturing of open abdominal wound after abscess on jan 23, 2017 with graft on feb., 14 2017 . wound not closing  came in for wound care. no distress. patient very thin history of alcoholism, last drink January 20, 2017. no problems eating or moving bowels. patietn with one inch opening on abdomin with staples

## 2017-02-27 NOTE — H&P ADULT. - HISTORY OF PRESENT ILLNESS
pt in 1/2017 laparotomy for intraabdominal abscess. p/o wound vac. In Feb 14 2017 AAW skin graft. Presently infected for skin surgery in AM admitted.  Lost 9 lb pt in 1/2017 laparotomy for intraabdominal abscess. p/o wound vac. In Feb 14 2017 AAW skin graft. Presently infected for skin surgery in AM admitted.  Lost 9 lb  Hx AAW abscess MSSA  Hx KANE/ CKD

## 2017-02-27 NOTE — ED ADULT TRIAGE NOTE - CHIEF COMPLAINT QUOTE
pt states" I was sent in by dr mendoza to have surgery on my abdominal wound." pt states he had abdominal surgery on feb 16.

## 2017-02-27 NOTE — ED PROVIDER NOTE - PROGRESS NOTE DETAILS
Discussed with Dr. Whitaker request admit to Dr. Mckenzie and surgery tomorrow. Discussed with Dr. Wolfe who ordered abx.

## 2017-02-27 NOTE — H&P ADULT. - ASSESSMENT
AAW wound, CKD, anemia  EKG  CXR  IV fluids  IV AB  Renal and bladder sono  ID consult  Nephrology consult  BMP am

## 2017-02-27 NOTE — ED PROVIDER NOTE - OBJECTIVE STATEMENT
Patient is 56 with abdominal wall skin graft and sent in by Dr. Whitaker for repeat surgery. Patient denies any fevers but states rash seen by plastics and sent him to ED for admssion and surgery tmorrow. Patient denies any complaints.

## 2017-02-28 ENCOUNTER — TRANSCRIPTION ENCOUNTER (OUTPATIENT)
Age: 57
End: 2017-02-28

## 2017-02-28 VITALS
RESPIRATION RATE: 17 BRPM | SYSTOLIC BLOOD PRESSURE: 100 MMHG | OXYGEN SATURATION: 95 % | HEART RATE: 97 BPM | DIASTOLIC BLOOD PRESSURE: 68 MMHG | TEMPERATURE: 98 F

## 2017-02-28 LAB
ANION GAP SERPL CALC-SCNC: 7 MMOL/L — SIGNIFICANT CHANGE UP (ref 5–17)
BUN SERPL-MCNC: 35 MG/DL — HIGH (ref 7–23)
CALCIUM SERPL-MCNC: 8.9 MG/DL — SIGNIFICANT CHANGE UP (ref 8.5–10.1)
CHLORIDE SERPL-SCNC: 107 MMOL/L — SIGNIFICANT CHANGE UP (ref 96–108)
CO2 SERPL-SCNC: 25 MMOL/L — SIGNIFICANT CHANGE UP (ref 22–31)
CREAT SERPL-MCNC: 1.77 MG/DL — HIGH (ref 0.5–1.3)
FOLATE SERPL-MCNC: 11.3 NG/ML — SIGNIFICANT CHANGE UP (ref 4.8–24.2)
GLUCOSE SERPL-MCNC: 103 MG/DL — HIGH (ref 70–99)
IRON SATN MFR SERPL: 28 % — SIGNIFICANT CHANGE UP (ref 16–55)
IRON SATN MFR SERPL: 87 UG/DL — SIGNIFICANT CHANGE UP (ref 45–165)
POTASSIUM SERPL-MCNC: 4.3 MMOL/L — SIGNIFICANT CHANGE UP (ref 3.5–5.3)
POTASSIUM SERPL-SCNC: 4.3 MMOL/L — SIGNIFICANT CHANGE UP (ref 3.5–5.3)
SODIUM SERPL-SCNC: 139 MMOL/L — SIGNIFICANT CHANGE UP (ref 135–145)
TIBC SERPL-MCNC: 315 UG/DL — SIGNIFICANT CHANGE UP (ref 220–430)
UIBC SERPL-MCNC: 228 UG/DL — SIGNIFICANT CHANGE UP (ref 110–370)
VIT B12 SERPL-MCNC: 784 PG/ML — SIGNIFICANT CHANGE UP (ref 243–894)

## 2017-02-28 PROCEDURE — 99232 SBSQ HOSP IP/OBS MODERATE 35: CPT

## 2017-02-28 RX ORDER — ALBUTEROL 90 UG/1
0 AEROSOL, METERED ORAL
Qty: 0 | Refills: 0 | COMMUNITY

## 2017-02-28 RX ORDER — ACETAMINOPHEN 500 MG
800 TABLET ORAL ONCE
Qty: 0 | Refills: 0 | Status: DISCONTINUED | OUTPATIENT
Start: 2017-02-28 | End: 2017-02-28

## 2017-02-28 RX ORDER — AMPICILLIN SODIUM AND SULBACTAM SODIUM 250; 125 MG/ML; MG/ML
3 INJECTION, POWDER, FOR SUSPENSION INTRAMUSCULAR; INTRAVENOUS EVERY 6 HOURS
Qty: 0 | Refills: 0 | Status: DISCONTINUED | OUTPATIENT
Start: 2017-02-28 | End: 2017-02-28

## 2017-02-28 RX ORDER — SODIUM CHLORIDE 9 MG/ML
1000 INJECTION, SOLUTION INTRAVENOUS
Qty: 0 | Refills: 0 | Status: DISCONTINUED | OUTPATIENT
Start: 2017-02-28 | End: 2017-02-28

## 2017-02-28 RX ORDER — ACETAMINOPHEN 500 MG
650 TABLET ORAL EVERY 6 HOURS
Qty: 0 | Refills: 0 | Status: DISCONTINUED | OUTPATIENT
Start: 2017-02-28 | End: 2017-02-28

## 2017-02-28 RX ORDER — ACETAMINOPHEN WITH CODEINE 300MG-30MG
2 TABLET ORAL EVERY 4 HOURS
Qty: 0 | Refills: 0 | Status: DISCONTINUED | OUTPATIENT
Start: 2017-02-28 | End: 2017-02-28

## 2017-02-28 RX ADMIN — AMPICILLIN SODIUM AND SULBACTAM SODIUM 200 GRAM(S): 250; 125 INJECTION, POWDER, FOR SUSPENSION INTRAMUSCULAR; INTRAVENOUS at 11:02

## 2017-02-28 RX ADMIN — AMPICILLIN SODIUM AND SULBACTAM SODIUM 200 GRAM(S): 250; 125 INJECTION, POWDER, FOR SUSPENSION INTRAMUSCULAR; INTRAVENOUS at 05:14

## 2017-02-28 RX ADMIN — SODIUM CHLORIDE 100 MILLILITER(S): 9 INJECTION, SOLUTION INTRAVENOUS at 09:31

## 2017-02-28 NOTE — DISCHARGE NOTE ADULT - PATIENT PORTAL LINK FT
“You can access the [facility] Patient Portal, offered by [facility], by registering with the following website: [email or web address]”

## 2017-02-28 NOTE — DISCHARGE NOTE ADULT - MEDICATION SUMMARY - MEDICATIONS TO TAKE
I will START or STAY ON the medications listed below when I get home from the hospital:    Percocet 5/325 325 mg-5 mg oral tablet  -- 1 tab(s) by mouth every 6 hours, As Needed MDD:4 - for severe pain  -- Caution federal law prohibits the transfer of this drug to any person other  than the person for whom it was prescribed.  May cause drowsiness.  Alcohol may intensify this effect.  Use care when operating dangerous machinery.  This prescription cannot be refilled.  This product contains acetaminophen.  Do not use  with any other product containing acetaminophen to prevent possible liver damage.  Using more of this medication than prescribed may cause serious breathing problems.    -- Indication: For Abdominal wall abscess    cyclobenzaprine 5 mg oral tablet  -- 1 tab(s) by mouth 3 times a day  -- Indication: For ATN (acute tubular necrosis)    Augmentin 875 mg-125 mg oral tablet  -- 1 tab(s) by mouth every 12 hours  -- Indication: For Abdominal wall abscess I will START or STAY ON the medications listed below when I get home from the hospital:    Percocet 5/325 325 mg-5 mg oral tablet  -- 1 tab(s) by mouth every 6 hours, As Needed MDD:4 - for severe pain  -- Caution federal law prohibits the transfer of this drug to any person other  than the person for whom it was prescribed.  May cause drowsiness.  Alcohol may intensify this effect.  Use care when operating dangerous machinery.  This prescription cannot be refilled.  This product contains acetaminophen.  Do not use  with any other product containing acetaminophen to prevent possible liver damage.  Using more of this medication than prescribed may cause serious breathing problems.    -- Indication: For Abdominal wall abscess    hydroCHLOROthiazide 50 mg oral tablet  -- 1 tab(s) by mouth once a day  -- Indication: For Essential hypertension    cyclobenzaprine 5 mg oral tablet  -- 1 tab(s) by mouth 3 times a day  -- Indication: For ATN (acute tubular necrosis)    Augmentin 875 mg-125 mg oral tablet  -- 1 tab(s) by mouth every 12 hours  -- Indication: For Abdominal wall abscess
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
## 2017-02-28 NOTE — DISCHARGE NOTE ADULT - HOSPITAL COURSE
plastic surgery AAW by Dr Whitaker 2/28/17. d/c on oral AB.  Renal function improved.  No Alcohol. Smoking cessation.  f/u in AM with Dr Whitaker.  f/u with Dr. Bruce - chronic renal insufficiency

## 2017-02-28 NOTE — PROGRESS NOTE ADULT - SUBJECTIVE AND OBJECTIVE BOX
Patient is a 56y old  Male who presents with a chief complaint of AAW wound (28 Feb 2017 09:17)      INTERVAL HPI / OVERNIGHT EVENTS:s/p wound closure today ,the fever wa when temp taken righ after he hdad hot tea.denies any chills etc    MEDICATIONS  (STANDING):  ampicillin/sulbactam  IVPB 3Gram(s) IV Intermittent every 6 hours  acetaminophen  IVPB. 800milliGRAM(s) IV Intermittent once  hydrochlorothiazide   Tablet 50milliGRAM(s) Oral daily    MEDICATIONS  (PRN):  acetaminophen 300 mG/codeine 30 mG 2Tablet(s) Oral every 4 hours PRN Moderate Pain (4 - 6)  oxyCODONE  5 mG/acetaminophen 325 mG 1Tablet(s) Oral every 4 hours PRN Severe Pain (7 - 10)  acetaminophen   Tablet 650milliGRAM(s) Oral every 6 hours PRN For Temp greater than 38 C (100.4 F)      Vital Signs Last 24 Hrs  T(C): 36.7, Max: 38.2 (02-28 @ 12:00)  T(F): 98, Max: 100.8 (02-28 @ 12:00)  HR: 97 (70 - 116)  BP: 100/68 (100/68 - 156/103)  BP(mean): --  RR: 17 (13 - 19)  SpO2: 95% (95% - 100%)    PHYSICAL EXAM:    Constitutional: NAD, well-groomed, well-developed  Respiratory: CTAB/L  Cardiovascular: S1 and S2, RRR, no M/G/R  Gastrointestinal: s/p surgery in surgical dressing  Extremities: No peripheral edema  Vascular: 2+ peripheral pulses  Skin: No rashes      LABS:                        9.5    4.2   )-----------( 171      ( 27 Feb 2017 14:59 )             27.3     28 Feb 2017 07:42    139    |  107    |  35     ----------------------------<  103    4.3     |  25     |  1.77     Ca    8.9        28 Feb 2017 07:42    TPro  8.8    /  Alb  3.8    /  TBili  0.3    /  DBili  x      /  AST  23     /  ALT  24     /  AlkPhos  71     27 Feb 2017 14:59            MICROBIOLOGY:  RECENT CULTURES:  02-27 .Blood Blood-Peripheral XXXX XXXX   No growth to date.          RADIOLOGY & ADDITIONAL STUDIES:

## 2017-02-28 NOTE — DISCHARGE NOTE ADULT - CARE PROVIDER_API CALL
Davy Whitaker), Plastic Surgery  [address] [facility] [address]  [place], [place] [id number]  Phone: [phone]  Fax: [phone]    Syed Mckenzie (), Family Medicine  [address]  [place] [address]  Phone: [phone]  Fax: [phone]    Artemio Bruce), Internal Medicine; Nephrology  [address] [facility] [address] [place] [id number]  Phone: [phone]  Fax: [phone]

## 2017-02-28 NOTE — DISCHARGE NOTE ADULT - CARE PLAN
Principal Discharge DX:	Abdominal wall abscess  Goal:	f/u with Dr. Whitaker in am  Instructions for follow-up, activity and diet:	renal diet; f/u with PMD and nephrologist  Secondary Diagnosis:	ATN (acute tubular necrosis)  Secondary Diagnosis:	Essential hypertension  Secondary Diagnosis:	H/O exploratory laparotomy  Secondary Diagnosis:	Essential hypertension Principal Discharge DX:	Abdominal wall abscess  Goal:	f/u with Dr. Whitaker in am  Instructions for follow-up, activity and diet:	renal diet; f/u with PMD and nephrologist  Secondary Diagnosis:	ATN (acute tubular necrosis)  Instructions for follow-up, activity and diet:	please follow up with PMD before resuming Hydrochlorothiazide (HCTZ)  Secondary Diagnosis:	Essential hypertension  Secondary Diagnosis:	H/O exploratory laparotomy  Secondary Diagnosis:	Essential hypertension

## 2017-02-28 NOTE — DISCHARGE NOTE ADULT - PLAN OF CARE
f/u with Dr. Whitaker in  renal diet; f/u with PMD and nephrologist please follow up with PMD before resuming Hydrochlorothiazide (HCTZ)

## 2017-02-28 NOTE — CONSULT NOTE ADULT - SUBJECTIVE AND OBJECTIVE BOX
see dictated note.  options, alternatives, risks, cxs discussed.  will proceed with plan.  case discussed with Dr Diego in person.

## 2017-02-28 NOTE — DISCHARGE NOTE ADULT - CARE PROVIDERS DIRECT ADDRESSES
,DirectAddress_Unknown,DirectAddress_Unknown,[email or web address],DirectAddress_Unknown

## 2017-02-28 NOTE — DISCHARGE NOTE ADULT - MEDICATION SUMMARY - MEDICATIONS TO STOP TAKING
I will STOP taking the medications listed below when I get home from the hospital:  None I will STOP taking the medications listed below when I get home from the hospital:    hydroCHLOROthiazide 50 mg oral tablet  -- 1 tab(s) by mouth once a day MDD:1

## 2017-03-02 LAB — SURGICAL PATHOLOGY FINAL REPORT - CH: SIGNIFICANT CHANGE UP

## 2017-03-03 DIAGNOSIS — N17.0 ACUTE KIDNEY FAILURE WITH TUBULAR NECROSIS: ICD-10-CM

## 2017-03-03 DIAGNOSIS — T86.822 SKIN GRAFT (ALLOGRAFT) (AUTOGRAFT) INFECTION: ICD-10-CM

## 2017-03-03 DIAGNOSIS — I10 ESSENTIAL (PRIMARY) HYPERTENSION: ICD-10-CM
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
## 2017-03-03 DIAGNOSIS — F10.20 ALCOHOL DEPENDENCE, UNCOMPLICATED: ICD-10-CM

## 2017-03-03 DIAGNOSIS — L02.211 CUTANEOUS ABSCESS OF ABDOMINAL WALL: ICD-10-CM

## 2017-03-04 LAB
CULTURE RESULTS: SIGNIFICANT CHANGE UP
CULTURE RESULTS: SIGNIFICANT CHANGE UP
SPECIMEN SOURCE: SIGNIFICANT CHANGE UP
SPECIMEN SOURCE: SIGNIFICANT CHANGE UP

## 2017-03-05 LAB
CULTURE RESULTS: SIGNIFICANT CHANGE UP
SPECIMEN SOURCE: SIGNIFICANT CHANGE UP

## 2017-04-18 ENCOUNTER — APPOINTMENT (OUTPATIENT)
Dept: WOUND CARE | Facility: HOSPITAL | Age: 57
End: 2017-04-18

## 2017-04-18 ENCOUNTER — OUTPATIENT (OUTPATIENT)
Dept: OUTPATIENT SERVICES | Facility: HOSPITAL | Age: 57
LOS: 1 days | Discharge: HOME | End: 2017-04-18

## 2017-04-18 DIAGNOSIS — S30.1XXA CONTUSION OF ABDOMINAL WALL, INITIAL ENCOUNTER: Chronic | ICD-10-CM

## 2017-04-18 DIAGNOSIS — L89.90 PRESSURE ULCER OF UNSPECIFIED SITE, UNSPECIFIED STAGE: ICD-10-CM

## 2017-04-18 DIAGNOSIS — Z98.890 OTHER SPECIFIED POSTPROCEDURAL STATES: Chronic | ICD-10-CM

## 2017-04-18 LAB
ALBUMIN SERPL ELPH-MCNC: 3.7 G/DL — SIGNIFICANT CHANGE UP (ref 3.3–5)
ALP SERPL-CCNC: 78 U/L — SIGNIFICANT CHANGE UP (ref 40–120)
ALT FLD-CCNC: 24 U/L — SIGNIFICANT CHANGE UP (ref 12–78)
ANION GAP SERPL CALC-SCNC: 8 MMOL/L — SIGNIFICANT CHANGE UP (ref 5–17)
AST SERPL-CCNC: 20 U/L — SIGNIFICANT CHANGE UP (ref 15–37)
BILIRUB SERPL-MCNC: 0.4 MG/DL — SIGNIFICANT CHANGE UP (ref 0.2–1.2)
BUN SERPL-MCNC: 19 MG/DL — SIGNIFICANT CHANGE UP (ref 7–23)
CALCIUM SERPL-MCNC: 9.8 MG/DL — SIGNIFICANT CHANGE UP (ref 8.5–10.1)
CHLORIDE SERPL-SCNC: 103 MMOL/L — SIGNIFICANT CHANGE UP (ref 96–108)
CO2 SERPL-SCNC: 30 MMOL/L — SIGNIFICANT CHANGE UP (ref 22–31)
CREAT SERPL-MCNC: 1.2 MG/DL — SIGNIFICANT CHANGE UP (ref 0.5–1.3)
ERYTHROCYTE [SEDIMENTATION RATE] IN BLOOD: 34 MM/HR — HIGH (ref 0–20)
GLUCOSE SERPL-MCNC: 85 MG/DL — SIGNIFICANT CHANGE UP (ref 70–99)
HCT VFR BLD CALC: 36.5 % — LOW (ref 39–50)
HGB BLD-MCNC: 12.7 G/DL — LOW (ref 13–17)
MCHC RBC-ENTMCNC: 32.6 PG — SIGNIFICANT CHANGE UP (ref 27–34)
MCHC RBC-ENTMCNC: 34.9 GM/DL — SIGNIFICANT CHANGE UP (ref 32–36)
MCV RBC AUTO: 93.4 FL — SIGNIFICANT CHANGE UP (ref 80–100)
PLATELET # BLD AUTO: 176 K/UL — SIGNIFICANT CHANGE UP (ref 150–400)
POTASSIUM SERPL-MCNC: 4.1 MMOL/L — SIGNIFICANT CHANGE UP (ref 3.5–5.3)
POTASSIUM SERPL-SCNC: 4.1 MMOL/L — SIGNIFICANT CHANGE UP (ref 3.5–5.3)
PROT SERPL-MCNC: 8.2 GM/DL — SIGNIFICANT CHANGE UP (ref 6–8.3)
RBC # BLD: 3.91 M/UL — LOW (ref 4.2–5.8)
RBC # FLD: 12 % — SIGNIFICANT CHANGE UP (ref 11–15)
SODIUM SERPL-SCNC: 141 MMOL/L — SIGNIFICANT CHANGE UP (ref 135–145)
WBC # BLD: 4.9 K/UL — SIGNIFICANT CHANGE UP (ref 3.8–10.5)
WBC # FLD AUTO: 4.9 K/UL — SIGNIFICANT CHANGE UP (ref 3.8–10.5)

## 2017-04-19 LAB
CRP SERPL-MCNC: 0.58 MG/DL — HIGH (ref 0–0.4)
HBA1C BLD-MCNC: 5.1 % — SIGNIFICANT CHANGE UP (ref 4–5.6)
PREALB SERPL-MCNC: 22 MG/DL — SIGNIFICANT CHANGE UP (ref 18–45)
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
## 2017-04-20 DIAGNOSIS — L89.90 PRESSURE ULCER OF UNSPECIFIED SITE, UNSPECIFIED STAGE: ICD-10-CM

## 2017-04-20 DIAGNOSIS — Z84.1 FAMILY HISTORY OF DISORDERS OF KIDNEY AND URETER: ICD-10-CM

## 2017-04-20 DIAGNOSIS — Z83.3 FAMILY HISTORY OF DIABETES MELLITUS: ICD-10-CM

## 2017-04-20 DIAGNOSIS — Z97.3 PRESENCE OF SPECTACLES AND CONTACT LENSES: ICD-10-CM

## 2017-04-20 DIAGNOSIS — I10 ESSENTIAL (PRIMARY) HYPERTENSION: ICD-10-CM

## 2017-04-20 DIAGNOSIS — Z87.828 PERSONAL HISTORY OF OTHER (HEALED) PHYSICAL INJURY AND TRAUMA: ICD-10-CM

## 2017-04-20 DIAGNOSIS — Z82.49 FAMILY HISTORY OF ISCHEMIC HEART DISEASE AND OTHER DISEASES OF THE CIRCULATORY SYSTEM: ICD-10-CM

## 2017-04-20 DIAGNOSIS — R20.2 PARESTHESIA OF SKIN: ICD-10-CM

## 2017-04-20 DIAGNOSIS — Z80.9 FAMILY HISTORY OF MALIGNANT NEOPLASM, UNSPECIFIED: ICD-10-CM

## 2017-04-20 DIAGNOSIS — M19.042 PRIMARY OSTEOARTHRITIS, LEFT HAND: ICD-10-CM
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
## 2017-04-20 DIAGNOSIS — M19.041 PRIMARY OSTEOARTHRITIS, RIGHT HAND: ICD-10-CM

## 2017-04-20 DIAGNOSIS — T86.821 SKIN GRAFT (ALLOGRAFT) (AUTOGRAFT) FAILURE: ICD-10-CM

## 2017-04-20 DIAGNOSIS — J45.998 OTHER ASTHMA: ICD-10-CM

## 2017-04-20 DIAGNOSIS — M10.9 GOUT, UNSPECIFIED: ICD-10-CM

## 2017-04-20 DIAGNOSIS — L02.211 CUTANEOUS ABSCESS OF ABDOMINAL WALL: ICD-10-CM

## 2017-04-20 DIAGNOSIS — F17.210 NICOTINE DEPENDENCE, CIGARETTES, UNCOMPLICATED: ICD-10-CM

## 2017-04-25 ENCOUNTER — OUTPATIENT (OUTPATIENT)
Dept: OUTPATIENT SERVICES | Facility: HOSPITAL | Age: 57
LOS: 1 days | Discharge: HOME | End: 2017-04-25

## 2017-04-25 ENCOUNTER — APPOINTMENT (OUTPATIENT)
Dept: WOUND CARE | Facility: HOSPITAL | Age: 57
End: 2017-04-25

## 2017-04-25 DIAGNOSIS — S30.1XXA CONTUSION OF ABDOMINAL WALL, INITIAL ENCOUNTER: Chronic | ICD-10-CM

## 2017-04-25 DIAGNOSIS — L89.90 PRESSURE ULCER OF UNSPECIFIED SITE, UNSPECIFIED STAGE: ICD-10-CM

## 2017-04-25 DIAGNOSIS — Z98.890 OTHER SPECIFIED POSTPROCEDURAL STATES: Chronic | ICD-10-CM

## 2017-04-27 DIAGNOSIS — M10.9 GOUT, UNSPECIFIED: ICD-10-CM

## 2017-04-27 DIAGNOSIS — M19.90 UNSPECIFIED OSTEOARTHRITIS, UNSPECIFIED SITE: ICD-10-CM

## 2017-04-27 DIAGNOSIS — J45.998 OTHER ASTHMA: ICD-10-CM
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
## 2017-04-27 DIAGNOSIS — T86.821 SKIN GRAFT (ALLOGRAFT) (AUTOGRAFT) FAILURE: ICD-10-CM

## 2017-04-27 DIAGNOSIS — L89.90 PRESSURE ULCER OF UNSPECIFIED SITE, UNSPECIFIED STAGE: ICD-10-CM
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
## 2017-04-27 DIAGNOSIS — I10 ESSENTIAL (PRIMARY) HYPERTENSION: ICD-10-CM

## 2017-05-01 ENCOUNTER — APPOINTMENT (OUTPATIENT)
Dept: HYPERBARIC MEDICINE | Facility: HOSPITAL | Age: 57
End: 2017-05-01

## 2017-05-01 ENCOUNTER — OUTPATIENT (OUTPATIENT)
Dept: OUTPATIENT SERVICES | Facility: HOSPITAL | Age: 57
LOS: 1 days | Discharge: HOME | End: 2017-05-01

## 2017-05-01 DIAGNOSIS — Z98.890 OTHER SPECIFIED POSTPROCEDURAL STATES: Chronic | ICD-10-CM

## 2017-05-01 DIAGNOSIS — S30.1XXA CONTUSION OF ABDOMINAL WALL, INITIAL ENCOUNTER: Chronic | ICD-10-CM

## 2017-05-01 DIAGNOSIS — L89.90 PRESSURE ULCER OF UNSPECIFIED SITE, UNSPECIFIED STAGE: ICD-10-CM

## 2017-05-02 ENCOUNTER — APPOINTMENT (OUTPATIENT)
Dept: HYPERBARIC MEDICINE | Facility: HOSPITAL | Age: 57
End: 2017-05-02

## 2017-05-02 ENCOUNTER — OUTPATIENT (OUTPATIENT)
Dept: OUTPATIENT SERVICES | Facility: HOSPITAL | Age: 57
LOS: 1 days | Discharge: HOME | End: 2017-05-02

## 2017-05-02 DIAGNOSIS — T86.821 SKIN GRAFT (ALLOGRAFT) (AUTOGRAFT) FAILURE: ICD-10-CM

## 2017-05-02 DIAGNOSIS — Z98.890 OTHER SPECIFIED POSTPROCEDURAL STATES: Chronic | ICD-10-CM

## 2017-05-02 DIAGNOSIS — L89.90 PRESSURE ULCER OF UNSPECIFIED SITE, UNSPECIFIED STAGE: ICD-10-CM

## 2017-05-02 DIAGNOSIS — S30.1XXA CONTUSION OF ABDOMINAL WALL, INITIAL ENCOUNTER: Chronic | ICD-10-CM

## 2017-05-03 ENCOUNTER — OUTPATIENT (OUTPATIENT)
Dept: OUTPATIENT SERVICES | Facility: HOSPITAL | Age: 57
LOS: 1 days | Discharge: HOME | End: 2017-05-03

## 2017-05-03 ENCOUNTER — APPOINTMENT (OUTPATIENT)
Dept: WOUND CARE | Facility: HOSPITAL | Age: 57
End: 2017-05-03

## 2017-05-03 ENCOUNTER — APPOINTMENT (OUTPATIENT)
Dept: HYPERBARIC MEDICINE | Facility: HOSPITAL | Age: 57
End: 2017-05-03

## 2017-05-03 DIAGNOSIS — Z98.890 OTHER SPECIFIED POSTPROCEDURAL STATES: Chronic | ICD-10-CM

## 2017-05-03 DIAGNOSIS — L89.90 PRESSURE ULCER OF UNSPECIFIED SITE, UNSPECIFIED STAGE: ICD-10-CM

## 2017-05-03 DIAGNOSIS — T86.821 SKIN GRAFT (ALLOGRAFT) (AUTOGRAFT) FAILURE: ICD-10-CM

## 2017-05-03 DIAGNOSIS — S30.1XXA CONTUSION OF ABDOMINAL WALL, INITIAL ENCOUNTER: Chronic | ICD-10-CM

## 2017-05-04 ENCOUNTER — APPOINTMENT (OUTPATIENT)
Age: 57
End: 2017-05-04

## 2017-05-04 ENCOUNTER — OUTPATIENT (OUTPATIENT)
Dept: OUTPATIENT SERVICES | Facility: HOSPITAL | Age: 57
LOS: 1 days | Discharge: HOME | End: 2017-05-04

## 2017-05-04 DIAGNOSIS — Z98.890 OTHER SPECIFIED POSTPROCEDURAL STATES: Chronic | ICD-10-CM

## 2017-05-04 DIAGNOSIS — S30.1XXA CONTUSION OF ABDOMINAL WALL, INITIAL ENCOUNTER: Chronic | ICD-10-CM

## 2017-05-04 DIAGNOSIS — L89.90 PRESSURE ULCER OF UNSPECIFIED SITE, UNSPECIFIED STAGE: ICD-10-CM

## 2017-05-04 DIAGNOSIS — T86.821 SKIN GRAFT (ALLOGRAFT) (AUTOGRAFT) FAILURE: ICD-10-CM

## 2017-05-05 ENCOUNTER — APPOINTMENT (OUTPATIENT)
Age: 57
End: 2017-05-05

## 2017-05-05 ENCOUNTER — OUTPATIENT (OUTPATIENT)
Dept: OUTPATIENT SERVICES | Facility: HOSPITAL | Age: 57
LOS: 1 days | Discharge: HOME | End: 2017-05-05

## 2017-05-05 DIAGNOSIS — Z98.890 OTHER SPECIFIED POSTPROCEDURAL STATES: Chronic | ICD-10-CM

## 2017-05-05 DIAGNOSIS — L89.90 PRESSURE ULCER OF UNSPECIFIED SITE, UNSPECIFIED STAGE: ICD-10-CM

## 2017-05-05 DIAGNOSIS — S30.1XXA CONTUSION OF ABDOMINAL WALL, INITIAL ENCOUNTER: Chronic | ICD-10-CM

## 2017-05-05 DIAGNOSIS — T86.821 SKIN GRAFT (ALLOGRAFT) (AUTOGRAFT) FAILURE: ICD-10-CM

## 2017-05-08 ENCOUNTER — APPOINTMENT (OUTPATIENT)
Age: 57
End: 2017-05-08

## 2017-05-08 ENCOUNTER — OUTPATIENT (OUTPATIENT)
Dept: OUTPATIENT SERVICES | Facility: HOSPITAL | Age: 57
LOS: 1 days | Discharge: HOME | End: 2017-05-08

## 2017-05-08 DIAGNOSIS — T86.821 SKIN GRAFT (ALLOGRAFT) (AUTOGRAFT) FAILURE: ICD-10-CM

## 2017-05-08 DIAGNOSIS — S30.1XXA CONTUSION OF ABDOMINAL WALL, INITIAL ENCOUNTER: Chronic | ICD-10-CM

## 2017-05-08 DIAGNOSIS — L89.90 PRESSURE ULCER OF UNSPECIFIED SITE, UNSPECIFIED STAGE: ICD-10-CM

## 2017-05-08 DIAGNOSIS — Z98.890 OTHER SPECIFIED POSTPROCEDURAL STATES: Chronic | ICD-10-CM

## 2017-05-09 ENCOUNTER — OUTPATIENT (OUTPATIENT)
Dept: OUTPATIENT SERVICES | Facility: HOSPITAL | Age: 57
LOS: 1 days | Discharge: HOME | End: 2017-05-09

## 2017-05-09 ENCOUNTER — APPOINTMENT (OUTPATIENT)
Age: 57
End: 2017-05-09

## 2017-05-09 DIAGNOSIS — L89.90 PRESSURE ULCER OF UNSPECIFIED SITE, UNSPECIFIED STAGE: ICD-10-CM

## 2017-05-09 DIAGNOSIS — Z98.890 OTHER SPECIFIED POSTPROCEDURAL STATES: Chronic | ICD-10-CM

## 2017-05-09 DIAGNOSIS — T86.821 SKIN GRAFT (ALLOGRAFT) (AUTOGRAFT) FAILURE: ICD-10-CM

## 2017-05-09 DIAGNOSIS — S30.1XXA CONTUSION OF ABDOMINAL WALL, INITIAL ENCOUNTER: Chronic | ICD-10-CM

## 2017-05-10 ENCOUNTER — APPOINTMENT (OUTPATIENT)
Dept: WOUND CARE | Facility: HOSPITAL | Age: 57
End: 2017-05-10

## 2017-05-10 ENCOUNTER — APPOINTMENT (OUTPATIENT)
Age: 57
End: 2017-05-10

## 2017-05-10 ENCOUNTER — OUTPATIENT (OUTPATIENT)
Dept: OUTPATIENT SERVICES | Facility: HOSPITAL | Age: 57
LOS: 1 days | Discharge: HOME | End: 2017-05-10

## 2017-05-10 DIAGNOSIS — Z98.890 OTHER SPECIFIED POSTPROCEDURAL STATES: Chronic | ICD-10-CM

## 2017-05-10 DIAGNOSIS — T86.821 SKIN GRAFT (ALLOGRAFT) (AUTOGRAFT) FAILURE: ICD-10-CM

## 2017-05-10 DIAGNOSIS — L89.90 PRESSURE ULCER OF UNSPECIFIED SITE, UNSPECIFIED STAGE: ICD-10-CM

## 2017-05-10 DIAGNOSIS — S30.1XXA CONTUSION OF ABDOMINAL WALL, INITIAL ENCOUNTER: Chronic | ICD-10-CM

## 2017-05-11 ENCOUNTER — OUTPATIENT (OUTPATIENT)
Dept: OUTPATIENT SERVICES | Facility: HOSPITAL | Age: 57
LOS: 1 days | Discharge: HOME | End: 2017-05-11

## 2017-05-11 ENCOUNTER — APPOINTMENT (OUTPATIENT)
Age: 57
End: 2017-05-11

## 2017-05-11 DIAGNOSIS — T86.821 SKIN GRAFT (ALLOGRAFT) (AUTOGRAFT) FAILURE: ICD-10-CM

## 2017-05-11 DIAGNOSIS — L89.90 PRESSURE ULCER OF UNSPECIFIED SITE, UNSPECIFIED STAGE: ICD-10-CM

## 2017-05-11 DIAGNOSIS — Z98.890 OTHER SPECIFIED POSTPROCEDURAL STATES: Chronic | ICD-10-CM

## 2017-05-11 DIAGNOSIS — S30.1XXA CONTUSION OF ABDOMINAL WALL, INITIAL ENCOUNTER: Chronic | ICD-10-CM

## 2017-05-12 ENCOUNTER — OUTPATIENT (OUTPATIENT)
Dept: OUTPATIENT SERVICES | Facility: HOSPITAL | Age: 57
LOS: 1 days | Discharge: HOME | End: 2017-05-12

## 2017-05-12 ENCOUNTER — APPOINTMENT (OUTPATIENT)
Age: 57
End: 2017-05-12

## 2017-05-12 DIAGNOSIS — S30.1XXA CONTUSION OF ABDOMINAL WALL, INITIAL ENCOUNTER: Chronic | ICD-10-CM

## 2017-05-12 DIAGNOSIS — L89.90 PRESSURE ULCER OF UNSPECIFIED SITE, UNSPECIFIED STAGE: ICD-10-CM

## 2017-05-12 DIAGNOSIS — Z98.890 OTHER SPECIFIED POSTPROCEDURAL STATES: Chronic | ICD-10-CM

## 2017-05-15 ENCOUNTER — APPOINTMENT (OUTPATIENT)
Age: 57
End: 2017-05-15

## 2017-05-15 ENCOUNTER — OUTPATIENT (OUTPATIENT)
Dept: OUTPATIENT SERVICES | Facility: HOSPITAL | Age: 57
LOS: 1 days | Discharge: HOME | End: 2017-05-15

## 2017-05-15 DIAGNOSIS — S30.1XXA CONTUSION OF ABDOMINAL WALL, INITIAL ENCOUNTER: Chronic | ICD-10-CM

## 2017-05-15 DIAGNOSIS — Z98.890 OTHER SPECIFIED POSTPROCEDURAL STATES: Chronic | ICD-10-CM

## 2017-05-15 DIAGNOSIS — L89.90 PRESSURE ULCER OF UNSPECIFIED SITE, UNSPECIFIED STAGE: ICD-10-CM

## 2017-05-16 ENCOUNTER — APPOINTMENT (OUTPATIENT)
Dept: SURGERY | Facility: CLINIC | Age: 57
End: 2017-05-16

## 2017-05-16 ENCOUNTER — APPOINTMENT (OUTPATIENT)
Age: 57
End: 2017-05-16

## 2017-05-16 ENCOUNTER — OUTPATIENT (OUTPATIENT)
Dept: OUTPATIENT SERVICES | Facility: HOSPITAL | Age: 57
LOS: 1 days | Discharge: HOME | End: 2017-05-16

## 2017-05-16 VITALS
HEART RATE: 105 BPM | DIASTOLIC BLOOD PRESSURE: 99 MMHG | TEMPERATURE: 97.7 F | HEIGHT: 68 IN | OXYGEN SATURATION: 100 % | SYSTOLIC BLOOD PRESSURE: 139 MMHG | RESPIRATION RATE: 18 BRPM

## 2017-05-16 DIAGNOSIS — J45.909 UNSPECIFIED ASTHMA, UNCOMPLICATED: ICD-10-CM

## 2017-05-16 DIAGNOSIS — S30.1XXA CONTUSION OF ABDOMINAL WALL, INITIAL ENCOUNTER: Chronic | ICD-10-CM

## 2017-05-16 DIAGNOSIS — L89.90 PRESSURE ULCER OF UNSPECIFIED SITE, UNSPECIFIED STAGE: ICD-10-CM

## 2017-05-16 DIAGNOSIS — I10 ESSENTIAL (PRIMARY) HYPERTENSION: ICD-10-CM

## 2017-05-16 DIAGNOSIS — Z78.9 OTHER SPECIFIED HEALTH STATUS: ICD-10-CM

## 2017-05-16 DIAGNOSIS — Z98.890 OTHER SPECIFIED POSTPROCEDURAL STATES: Chronic | ICD-10-CM

## 2017-05-16 DIAGNOSIS — Z82.69 FAMILY HISTORY OF OTHER DISEASES OF THE MUSCULOSKELETAL SYSTEM AND CONNECTIVE TISSUE: ICD-10-CM

## 2017-05-16 DIAGNOSIS — Z82.49 FAMILY HISTORY OF ISCHEMIC HEART DISEASE AND OTHER DISEASES OF THE CIRCULATORY SYSTEM: ICD-10-CM

## 2017-05-16 DIAGNOSIS — Z83.3 FAMILY HISTORY OF DIABETES MELLITUS: ICD-10-CM

## 2017-05-16 RX ORDER — LISINOPRIL AND HYDROCHLOROTHIAZIDE TABLETS 20; 25 MG/1; MG/1
20-25 TABLET ORAL
Qty: 30 | Refills: 0 | Status: ACTIVE | COMMUNITY
Start: 2016-12-07

## 2017-05-16 RX ORDER — IBUPROFEN 600 MG/1
600 TABLET, FILM COATED ORAL
Qty: 30 | Refills: 0 | Status: ACTIVE | COMMUNITY
Start: 2016-12-07

## 2017-05-16 RX ORDER — ALBUTEROL SULFATE 90 UG/1
108 (90 BASE) AEROSOL, METERED RESPIRATORY (INHALATION)
Qty: 8 | Refills: 0 | Status: ACTIVE | COMMUNITY
Start: 2016-10-02

## 2017-05-16 RX ORDER — FLUOCINONIDE 0.5 MG/G
0.05 CREAM TOPICAL
Qty: 15 | Refills: 0 | Status: ACTIVE | COMMUNITY
Start: 2017-01-06

## 2017-05-16 RX ORDER — ALBUTEROL SULFATE 90 UG/1
108 (90 BASE) POWDER, METERED RESPIRATORY (INHALATION)
Qty: 1 | Refills: 0 | Status: ACTIVE | COMMUNITY
Start: 2016-12-07

## 2017-05-16 RX ORDER — CLOTRIMAZOLE AND BETAMETHASONE DIPROPIONATE 10; .5 MG/G; MG/G
1-0.05 CREAM TOPICAL
Qty: 45 | Refills: 0 | Status: ACTIVE | COMMUNITY
Start: 2016-12-23

## 2017-05-17 ENCOUNTER — OUTPATIENT (OUTPATIENT)
Dept: OUTPATIENT SERVICES | Facility: HOSPITAL | Age: 57
LOS: 1 days | Discharge: HOME | End: 2017-05-17

## 2017-05-17 ENCOUNTER — APPOINTMENT (OUTPATIENT)
Dept: WOUND CARE | Facility: HOSPITAL | Age: 57
End: 2017-05-17

## 2017-05-17 ENCOUNTER — APPOINTMENT (OUTPATIENT)
Age: 57
End: 2017-05-17

## 2017-05-17 DIAGNOSIS — Z98.890 OTHER SPECIFIED POSTPROCEDURAL STATES: Chronic | ICD-10-CM

## 2017-05-17 DIAGNOSIS — T86.821 SKIN GRAFT (ALLOGRAFT) (AUTOGRAFT) FAILURE: ICD-10-CM

## 2017-05-17 DIAGNOSIS — S30.1XXA CONTUSION OF ABDOMINAL WALL, INITIAL ENCOUNTER: Chronic | ICD-10-CM

## 2017-05-17 DIAGNOSIS — L89.90 PRESSURE ULCER OF UNSPECIFIED SITE, UNSPECIFIED STAGE: ICD-10-CM

## 2017-05-18 ENCOUNTER — APPOINTMENT (OUTPATIENT)
Age: 57
End: 2017-05-18

## 2017-05-18 ENCOUNTER — OUTPATIENT (OUTPATIENT)
Dept: OUTPATIENT SERVICES | Facility: HOSPITAL | Age: 57
LOS: 1 days | Discharge: HOME | End: 2017-05-18

## 2017-05-18 DIAGNOSIS — S30.1XXA CONTUSION OF ABDOMINAL WALL, INITIAL ENCOUNTER: Chronic | ICD-10-CM

## 2017-05-18 DIAGNOSIS — Z98.890 OTHER SPECIFIED POSTPROCEDURAL STATES: Chronic | ICD-10-CM

## 2017-05-18 DIAGNOSIS — L89.90 PRESSURE ULCER OF UNSPECIFIED SITE, UNSPECIFIED STAGE: ICD-10-CM

## 2017-05-19 ENCOUNTER — APPOINTMENT (OUTPATIENT)
Age: 57
End: 2017-05-19

## 2017-05-19 ENCOUNTER — OUTPATIENT (OUTPATIENT)
Dept: OUTPATIENT SERVICES | Facility: HOSPITAL | Age: 57
LOS: 1 days | Discharge: HOME | End: 2017-05-19

## 2017-05-19 DIAGNOSIS — L89.90 PRESSURE ULCER OF UNSPECIFIED SITE, UNSPECIFIED STAGE: ICD-10-CM

## 2017-05-19 DIAGNOSIS — Z98.890 OTHER SPECIFIED POSTPROCEDURAL STATES: Chronic | ICD-10-CM

## 2017-05-19 DIAGNOSIS — S30.1XXA CONTUSION OF ABDOMINAL WALL, INITIAL ENCOUNTER: Chronic | ICD-10-CM

## 2017-05-22 ENCOUNTER — OUTPATIENT (OUTPATIENT)
Dept: OUTPATIENT SERVICES | Facility: HOSPITAL | Age: 57
LOS: 1 days | Discharge: HOME | End: 2017-05-22

## 2017-05-22 ENCOUNTER — APPOINTMENT (OUTPATIENT)
Age: 57
End: 2017-05-22

## 2017-05-22 DIAGNOSIS — L89.90 PRESSURE ULCER OF UNSPECIFIED SITE, UNSPECIFIED STAGE: ICD-10-CM

## 2017-05-22 DIAGNOSIS — S30.1XXA CONTUSION OF ABDOMINAL WALL, INITIAL ENCOUNTER: Chronic | ICD-10-CM

## 2017-05-22 DIAGNOSIS — Z98.890 OTHER SPECIFIED POSTPROCEDURAL STATES: Chronic | ICD-10-CM

## 2017-05-23 ENCOUNTER — APPOINTMENT (OUTPATIENT)
Dept: WOUND CARE | Facility: HOSPITAL | Age: 57
End: 2017-05-23

## 2017-05-23 ENCOUNTER — APPOINTMENT (OUTPATIENT)
Age: 57
End: 2017-05-23

## 2017-05-23 ENCOUNTER — OUTPATIENT (OUTPATIENT)
Dept: OUTPATIENT SERVICES | Facility: HOSPITAL | Age: 57
LOS: 1 days | Discharge: HOME | End: 2017-05-23

## 2017-05-23 DIAGNOSIS — Z98.890 OTHER SPECIFIED POSTPROCEDURAL STATES: Chronic | ICD-10-CM

## 2017-05-23 DIAGNOSIS — L89.90 PRESSURE ULCER OF UNSPECIFIED SITE, UNSPECIFIED STAGE: ICD-10-CM

## 2017-05-23 DIAGNOSIS — S30.1XXA CONTUSION OF ABDOMINAL WALL, INITIAL ENCOUNTER: Chronic | ICD-10-CM

## 2017-05-24 ENCOUNTER — OUTPATIENT (OUTPATIENT)
Dept: OUTPATIENT SERVICES | Facility: HOSPITAL | Age: 57
LOS: 1 days | Discharge: HOME | End: 2017-05-24

## 2017-05-24 ENCOUNTER — APPOINTMENT (OUTPATIENT)
Age: 57
End: 2017-05-24

## 2017-05-24 DIAGNOSIS — L89.90 PRESSURE ULCER OF UNSPECIFIED SITE, UNSPECIFIED STAGE: ICD-10-CM

## 2017-05-24 DIAGNOSIS — Z98.890 OTHER SPECIFIED POSTPROCEDURAL STATES: Chronic | ICD-10-CM

## 2017-05-24 DIAGNOSIS — S30.1XXA CONTUSION OF ABDOMINAL WALL, INITIAL ENCOUNTER: Chronic | ICD-10-CM

## 2017-05-25 ENCOUNTER — OUTPATIENT (OUTPATIENT)
Dept: OUTPATIENT SERVICES | Facility: HOSPITAL | Age: 57
LOS: 1 days | Discharge: HOME | End: 2017-05-25

## 2017-05-25 ENCOUNTER — APPOINTMENT (OUTPATIENT)
Age: 57
End: 2017-05-25

## 2017-05-25 DIAGNOSIS — L89.90 PRESSURE ULCER OF UNSPECIFIED SITE, UNSPECIFIED STAGE: ICD-10-CM

## 2017-05-25 DIAGNOSIS — Z98.890 OTHER SPECIFIED POSTPROCEDURAL STATES: Chronic | ICD-10-CM

## 2017-05-25 DIAGNOSIS — S30.1XXA CONTUSION OF ABDOMINAL WALL, INITIAL ENCOUNTER: Chronic | ICD-10-CM

## 2017-05-26 ENCOUNTER — OUTPATIENT (OUTPATIENT)
Dept: OUTPATIENT SERVICES | Facility: HOSPITAL | Age: 57
LOS: 1 days | Discharge: HOME | End: 2017-05-26

## 2017-05-26 ENCOUNTER — APPOINTMENT (OUTPATIENT)
Age: 57
End: 2017-05-26

## 2017-05-26 DIAGNOSIS — T86.821 SKIN GRAFT (ALLOGRAFT) (AUTOGRAFT) FAILURE: ICD-10-CM

## 2017-05-26 DIAGNOSIS — M10.9 GOUT, UNSPECIFIED: ICD-10-CM

## 2017-05-26 DIAGNOSIS — I10 ESSENTIAL (PRIMARY) HYPERTENSION: ICD-10-CM

## 2017-05-26 DIAGNOSIS — L89.90 PRESSURE ULCER OF UNSPECIFIED SITE, UNSPECIFIED STAGE: ICD-10-CM

## 2017-05-26 DIAGNOSIS — L92.9 GRANULOMATOUS DISORDER OF THE SKIN AND SUBCUTANEOUS TISSUE, UNSPECIFIED: ICD-10-CM
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
## 2017-05-26 DIAGNOSIS — S30.1XXA CONTUSION OF ABDOMINAL WALL, INITIAL ENCOUNTER: Chronic | ICD-10-CM

## 2017-05-26 DIAGNOSIS — M19.90 UNSPECIFIED OSTEOARTHRITIS, UNSPECIFIED SITE: ICD-10-CM

## 2017-05-26 DIAGNOSIS — J45.998 OTHER ASTHMA: ICD-10-CM
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
## 2017-05-26 DIAGNOSIS — Z98.890 OTHER SPECIFIED POSTPROCEDURAL STATES: Chronic | ICD-10-CM

## 2017-05-30 ENCOUNTER — OUTPATIENT (OUTPATIENT)
Dept: OUTPATIENT SERVICES | Facility: HOSPITAL | Age: 57
LOS: 1 days | Discharge: HOME | End: 2017-05-30

## 2017-05-30 ENCOUNTER — APPOINTMENT (OUTPATIENT)
Age: 57
End: 2017-05-30

## 2017-05-30 DIAGNOSIS — Z98.890 OTHER SPECIFIED POSTPROCEDURAL STATES: Chronic | ICD-10-CM

## 2017-05-30 DIAGNOSIS — T86.821 SKIN GRAFT (ALLOGRAFT) (AUTOGRAFT) FAILURE: ICD-10-CM

## 2017-05-30 DIAGNOSIS — S30.1XXA CONTUSION OF ABDOMINAL WALL, INITIAL ENCOUNTER: Chronic | ICD-10-CM

## 2017-05-30 DIAGNOSIS — L89.90 PRESSURE ULCER OF UNSPECIFIED SITE, UNSPECIFIED STAGE: ICD-10-CM

## 2017-05-31 ENCOUNTER — APPOINTMENT (OUTPATIENT)
Dept: WOUND CARE | Facility: HOSPITAL | Age: 57
End: 2017-05-31

## 2017-05-31 ENCOUNTER — APPOINTMENT (OUTPATIENT)
Age: 57
End: 2017-05-31

## 2017-05-31 ENCOUNTER — OUTPATIENT (OUTPATIENT)
Dept: OUTPATIENT SERVICES | Facility: HOSPITAL | Age: 57
LOS: 1 days | Discharge: HOME | End: 2017-05-31

## 2017-05-31 DIAGNOSIS — L89.90 PRESSURE ULCER OF UNSPECIFIED SITE, UNSPECIFIED STAGE: ICD-10-CM

## 2017-05-31 DIAGNOSIS — S30.1XXA CONTUSION OF ABDOMINAL WALL, INITIAL ENCOUNTER: Chronic | ICD-10-CM

## 2017-05-31 DIAGNOSIS — Z98.890 OTHER SPECIFIED POSTPROCEDURAL STATES: Chronic | ICD-10-CM

## 2017-06-01 ENCOUNTER — OUTPATIENT (OUTPATIENT)
Dept: OUTPATIENT SERVICES | Facility: HOSPITAL | Age: 57
LOS: 1 days | Discharge: HOME | End: 2017-06-01

## 2017-06-01 DIAGNOSIS — Z98.890 OTHER SPECIFIED POSTPROCEDURAL STATES: Chronic | ICD-10-CM

## 2017-06-01 DIAGNOSIS — T86.821 SKIN GRAFT (ALLOGRAFT) (AUTOGRAFT) FAILURE: ICD-10-CM

## 2017-06-01 DIAGNOSIS — S30.1XXA CONTUSION OF ABDOMINAL WALL, INITIAL ENCOUNTER: Chronic | ICD-10-CM

## 2017-06-01 DIAGNOSIS — L89.90 PRESSURE ULCER OF UNSPECIFIED SITE, UNSPECIFIED STAGE: ICD-10-CM

## 2017-06-02 ENCOUNTER — OUTPATIENT (OUTPATIENT)
Dept: OUTPATIENT SERVICES | Facility: HOSPITAL | Age: 57
LOS: 1 days | Discharge: HOME | End: 2017-06-02

## 2017-06-02 DIAGNOSIS — S30.1XXA CONTUSION OF ABDOMINAL WALL, INITIAL ENCOUNTER: Chronic | ICD-10-CM

## 2017-06-02 DIAGNOSIS — L89.90 PRESSURE ULCER OF UNSPECIFIED SITE, UNSPECIFIED STAGE: ICD-10-CM

## 2017-06-02 DIAGNOSIS — Z98.890 OTHER SPECIFIED POSTPROCEDURAL STATES: Chronic | ICD-10-CM

## 2017-06-06 ENCOUNTER — OUTPATIENT (OUTPATIENT)
Dept: OUTPATIENT SERVICES | Facility: HOSPITAL | Age: 57
LOS: 1 days | Discharge: HOME | End: 2017-06-06

## 2017-06-06 DIAGNOSIS — Z98.890 OTHER SPECIFIED POSTPROCEDURAL STATES: Chronic | ICD-10-CM

## 2017-06-06 DIAGNOSIS — L89.90 PRESSURE ULCER OF UNSPECIFIED SITE, UNSPECIFIED STAGE: ICD-10-CM

## 2017-06-06 DIAGNOSIS — S30.1XXA CONTUSION OF ABDOMINAL WALL, INITIAL ENCOUNTER: Chronic | ICD-10-CM

## 2017-06-07 ENCOUNTER — OUTPATIENT (OUTPATIENT)
Dept: OUTPATIENT SERVICES | Facility: HOSPITAL | Age: 57
LOS: 1 days | Discharge: HOME | End: 2017-06-07

## 2017-06-07 DIAGNOSIS — Z98.890 OTHER SPECIFIED POSTPROCEDURAL STATES: Chronic | ICD-10-CM

## 2017-06-07 DIAGNOSIS — S30.1XXA CONTUSION OF ABDOMINAL WALL, INITIAL ENCOUNTER: Chronic | ICD-10-CM

## 2017-06-07 DIAGNOSIS — L89.90 PRESSURE ULCER OF UNSPECIFIED SITE, UNSPECIFIED STAGE: ICD-10-CM

## 2017-06-08 ENCOUNTER — OUTPATIENT (OUTPATIENT)
Dept: OUTPATIENT SERVICES | Facility: HOSPITAL | Age: 57
LOS: 1 days | Discharge: HOME | End: 2017-06-08

## 2017-06-08 DIAGNOSIS — Z98.890 OTHER SPECIFIED POSTPROCEDURAL STATES: Chronic | ICD-10-CM

## 2017-06-08 DIAGNOSIS — S30.1XXA CONTUSION OF ABDOMINAL WALL, INITIAL ENCOUNTER: Chronic | ICD-10-CM

## 2017-06-08 DIAGNOSIS — L89.90 PRESSURE ULCER OF UNSPECIFIED SITE, UNSPECIFIED STAGE: ICD-10-CM

## 2017-06-09 ENCOUNTER — OUTPATIENT (OUTPATIENT)
Dept: OUTPATIENT SERVICES | Facility: HOSPITAL | Age: 57
LOS: 1 days | Discharge: HOME | End: 2017-06-09

## 2017-06-09 DIAGNOSIS — J45.998 OTHER ASTHMA: ICD-10-CM

## 2017-06-09 DIAGNOSIS — L92.9 GRANULOMATOUS DISORDER OF THE SKIN AND SUBCUTANEOUS TISSUE, UNSPECIFIED: ICD-10-CM

## 2017-06-09 DIAGNOSIS — M10.9 GOUT, UNSPECIFIED: ICD-10-CM
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
## 2017-06-09 DIAGNOSIS — L89.90 PRESSURE ULCER OF UNSPECIFIED SITE, UNSPECIFIED STAGE: ICD-10-CM

## 2017-06-09 DIAGNOSIS — Z98.890 OTHER SPECIFIED POSTPROCEDURAL STATES: Chronic | ICD-10-CM

## 2017-06-09 DIAGNOSIS — M19.90 UNSPECIFIED OSTEOARTHRITIS, UNSPECIFIED SITE: ICD-10-CM
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
## 2017-06-09 DIAGNOSIS — T86.821 SKIN GRAFT (ALLOGRAFT) (AUTOGRAFT) FAILURE: ICD-10-CM

## 2017-06-09 DIAGNOSIS — S30.1XXA CONTUSION OF ABDOMINAL WALL, INITIAL ENCOUNTER: Chronic | ICD-10-CM

## 2017-06-09 DIAGNOSIS — I10 ESSENTIAL (PRIMARY) HYPERTENSION: ICD-10-CM

## 2017-06-12 ENCOUNTER — OUTPATIENT (OUTPATIENT)
Dept: OUTPATIENT SERVICES | Facility: HOSPITAL | Age: 57
LOS: 1 days | Discharge: HOME | End: 2017-06-12

## 2017-06-12 DIAGNOSIS — Z98.890 OTHER SPECIFIED POSTPROCEDURAL STATES: Chronic | ICD-10-CM

## 2017-06-12 DIAGNOSIS — S30.1XXA CONTUSION OF ABDOMINAL WALL, INITIAL ENCOUNTER: Chronic | ICD-10-CM

## 2017-06-12 DIAGNOSIS — T86.821 SKIN GRAFT (ALLOGRAFT) (AUTOGRAFT) FAILURE: ICD-10-CM

## 2017-06-12 DIAGNOSIS — L89.90 PRESSURE ULCER OF UNSPECIFIED SITE, UNSPECIFIED STAGE: ICD-10-CM

## 2017-06-13 ENCOUNTER — OUTPATIENT (OUTPATIENT)
Dept: OUTPATIENT SERVICES | Facility: HOSPITAL | Age: 57
LOS: 1 days | Discharge: HOME | End: 2017-06-13

## 2017-06-13 DIAGNOSIS — L89.90 PRESSURE ULCER OF UNSPECIFIED SITE, UNSPECIFIED STAGE: ICD-10-CM

## 2017-06-13 DIAGNOSIS — S30.1XXA CONTUSION OF ABDOMINAL WALL, INITIAL ENCOUNTER: Chronic | ICD-10-CM

## 2017-06-13 DIAGNOSIS — Z98.890 OTHER SPECIFIED POSTPROCEDURAL STATES: Chronic | ICD-10-CM

## 2017-06-14 ENCOUNTER — OUTPATIENT (OUTPATIENT)
Dept: OUTPATIENT SERVICES | Facility: HOSPITAL | Age: 57
LOS: 1 days | Discharge: HOME | End: 2017-06-14

## 2017-06-14 DIAGNOSIS — S30.1XXA CONTUSION OF ABDOMINAL WALL, INITIAL ENCOUNTER: Chronic | ICD-10-CM

## 2017-06-14 DIAGNOSIS — T86.821 SKIN GRAFT (ALLOGRAFT) (AUTOGRAFT) FAILURE: ICD-10-CM

## 2017-06-14 DIAGNOSIS — Z98.890 OTHER SPECIFIED POSTPROCEDURAL STATES: Chronic | ICD-10-CM

## 2017-06-14 DIAGNOSIS — L89.90 PRESSURE ULCER OF UNSPECIFIED SITE, UNSPECIFIED STAGE: ICD-10-CM

## 2017-06-15 DIAGNOSIS — T86.821 SKIN GRAFT (ALLOGRAFT) (AUTOGRAFT) FAILURE: ICD-10-CM
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
## 2017-06-15 DIAGNOSIS — I10 ESSENTIAL (PRIMARY) HYPERTENSION: ICD-10-CM
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
## 2017-06-15 DIAGNOSIS — M10.9 GOUT, UNSPECIFIED: ICD-10-CM

## 2017-06-15 DIAGNOSIS — J45.998 OTHER ASTHMA: ICD-10-CM

## 2017-06-15 DIAGNOSIS — M19.90 UNSPECIFIED OSTEOARTHRITIS, UNSPECIFIED SITE: ICD-10-CM

## 2017-06-15 DIAGNOSIS — L92.9 GRANULOMATOUS DISORDER OF THE SKIN AND SUBCUTANEOUS TISSUE, UNSPECIFIED: ICD-10-CM

## 2017-06-16 ENCOUNTER — OUTPATIENT (OUTPATIENT)
Dept: OUTPATIENT SERVICES | Facility: HOSPITAL | Age: 57
LOS: 1 days | Discharge: HOME | End: 2017-06-16

## 2017-06-16 DIAGNOSIS — L89.90 PRESSURE ULCER OF UNSPECIFIED SITE, UNSPECIFIED STAGE: ICD-10-CM

## 2017-06-16 DIAGNOSIS — S30.1XXA CONTUSION OF ABDOMINAL WALL, INITIAL ENCOUNTER: Chronic | ICD-10-CM

## 2017-06-16 DIAGNOSIS — Z98.890 OTHER SPECIFIED POSTPROCEDURAL STATES: Chronic | ICD-10-CM

## 2017-06-19 DIAGNOSIS — T86.821 SKIN GRAFT (ALLOGRAFT) (AUTOGRAFT) FAILURE: ICD-10-CM

## 2017-06-20 ENCOUNTER — OUTPATIENT (OUTPATIENT)
Dept: OUTPATIENT SERVICES | Facility: HOSPITAL | Age: 57
LOS: 1 days | Discharge: HOME | End: 2017-06-20

## 2017-06-20 DIAGNOSIS — S30.1XXA CONTUSION OF ABDOMINAL WALL, INITIAL ENCOUNTER: Chronic | ICD-10-CM

## 2017-06-20 DIAGNOSIS — L89.90 PRESSURE ULCER OF UNSPECIFIED SITE, UNSPECIFIED STAGE: ICD-10-CM

## 2017-06-20 DIAGNOSIS — Z98.890 OTHER SPECIFIED POSTPROCEDURAL STATES: Chronic | ICD-10-CM

## 2017-06-21 ENCOUNTER — OUTPATIENT (OUTPATIENT)
Dept: OUTPATIENT SERVICES | Facility: HOSPITAL | Age: 57
LOS: 1 days | Discharge: HOME | End: 2017-06-21

## 2017-06-21 DIAGNOSIS — L89.90 PRESSURE ULCER OF UNSPECIFIED SITE, UNSPECIFIED STAGE: ICD-10-CM

## 2017-06-21 DIAGNOSIS — T86.821 SKIN GRAFT (ALLOGRAFT) (AUTOGRAFT) FAILURE: ICD-10-CM

## 2017-06-21 DIAGNOSIS — Z98.890 OTHER SPECIFIED POSTPROCEDURAL STATES: Chronic | ICD-10-CM

## 2017-06-21 DIAGNOSIS — S30.1XXA CONTUSION OF ABDOMINAL WALL, INITIAL ENCOUNTER: Chronic | ICD-10-CM

## 2017-06-22 ENCOUNTER — OUTPATIENT (OUTPATIENT)
Dept: OUTPATIENT SERVICES | Facility: HOSPITAL | Age: 57
LOS: 1 days | Discharge: HOME | End: 2017-06-22

## 2017-06-22 DIAGNOSIS — L89.90 PRESSURE ULCER OF UNSPECIFIED SITE, UNSPECIFIED STAGE: ICD-10-CM

## 2017-06-22 DIAGNOSIS — Z98.890 OTHER SPECIFIED POSTPROCEDURAL STATES: Chronic | ICD-10-CM

## 2017-06-22 DIAGNOSIS — S30.1XXA CONTUSION OF ABDOMINAL WALL, INITIAL ENCOUNTER: Chronic | ICD-10-CM

## 2017-06-23 ENCOUNTER — OUTPATIENT (OUTPATIENT)
Dept: OUTPATIENT SERVICES | Facility: HOSPITAL | Age: 57
LOS: 1 days | Discharge: HOME | End: 2017-06-23

## 2017-06-23 DIAGNOSIS — Z98.890 OTHER SPECIFIED POSTPROCEDURAL STATES: Chronic | ICD-10-CM

## 2017-06-23 DIAGNOSIS — S30.1XXA CONTUSION OF ABDOMINAL WALL, INITIAL ENCOUNTER: Chronic | ICD-10-CM

## 2017-06-23 DIAGNOSIS — L89.90 PRESSURE ULCER OF UNSPECIFIED SITE, UNSPECIFIED STAGE: ICD-10-CM

## 2017-06-26 ENCOUNTER — OUTPATIENT (OUTPATIENT)
Dept: OUTPATIENT SERVICES | Facility: HOSPITAL | Age: 57
LOS: 1 days | Discharge: HOME | End: 2017-06-26

## 2017-06-26 DIAGNOSIS — L89.90 PRESSURE ULCER OF UNSPECIFIED SITE, UNSPECIFIED STAGE: ICD-10-CM

## 2017-06-26 DIAGNOSIS — T86.821 SKIN GRAFT (ALLOGRAFT) (AUTOGRAFT) FAILURE: ICD-10-CM

## 2017-06-26 DIAGNOSIS — S30.1XXA CONTUSION OF ABDOMINAL WALL, INITIAL ENCOUNTER: Chronic | ICD-10-CM

## 2017-06-26 DIAGNOSIS — Z98.890 OTHER SPECIFIED POSTPROCEDURAL STATES: Chronic | ICD-10-CM

## 2017-06-27 ENCOUNTER — OUTPATIENT (OUTPATIENT)
Dept: OUTPATIENT SERVICES | Facility: HOSPITAL | Age: 57
LOS: 1 days | Discharge: HOME | End: 2017-06-27

## 2017-06-27 DIAGNOSIS — L89.90 PRESSURE ULCER OF UNSPECIFIED SITE, UNSPECIFIED STAGE: ICD-10-CM

## 2017-06-27 DIAGNOSIS — Z98.890 OTHER SPECIFIED POSTPROCEDURAL STATES: Chronic | ICD-10-CM

## 2017-06-27 DIAGNOSIS — S30.1XXA CONTUSION OF ABDOMINAL WALL, INITIAL ENCOUNTER: Chronic | ICD-10-CM

## 2017-06-28 ENCOUNTER — OUTPATIENT (OUTPATIENT)
Dept: OUTPATIENT SERVICES | Facility: HOSPITAL | Age: 57
LOS: 1 days | Discharge: HOME | End: 2017-06-28

## 2017-06-28 DIAGNOSIS — Z98.890 OTHER SPECIFIED POSTPROCEDURAL STATES: Chronic | ICD-10-CM

## 2017-06-28 DIAGNOSIS — S30.1XXA CONTUSION OF ABDOMINAL WALL, INITIAL ENCOUNTER: Chronic | ICD-10-CM

## 2017-06-28 DIAGNOSIS — L89.90 PRESSURE ULCER OF UNSPECIFIED SITE, UNSPECIFIED STAGE: ICD-10-CM

## 2017-06-29 ENCOUNTER — OUTPATIENT (OUTPATIENT)
Dept: OUTPATIENT SERVICES | Facility: HOSPITAL | Age: 57
LOS: 1 days | Discharge: HOME | End: 2017-06-29

## 2017-06-29 DIAGNOSIS — Z98.890 OTHER SPECIFIED POSTPROCEDURAL STATES: Chronic | ICD-10-CM

## 2017-06-29 DIAGNOSIS — S30.1XXA CONTUSION OF ABDOMINAL WALL, INITIAL ENCOUNTER: Chronic | ICD-10-CM

## 2017-06-29 DIAGNOSIS — L89.90 PRESSURE ULCER OF UNSPECIFIED SITE, UNSPECIFIED STAGE: ICD-10-CM

## 2017-06-30 ENCOUNTER — OUTPATIENT (OUTPATIENT)
Dept: OUTPATIENT SERVICES | Facility: HOSPITAL | Age: 57
LOS: 1 days | Discharge: HOME | End: 2017-06-30

## 2017-06-30 DIAGNOSIS — J45.998 OTHER ASTHMA: ICD-10-CM
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
## 2017-06-30 DIAGNOSIS — M10.9 GOUT, UNSPECIFIED: ICD-10-CM

## 2017-06-30 DIAGNOSIS — Z98.890 OTHER SPECIFIED POSTPROCEDURAL STATES: Chronic | ICD-10-CM

## 2017-06-30 DIAGNOSIS — M19.90 UNSPECIFIED OSTEOARTHRITIS, UNSPECIFIED SITE: ICD-10-CM

## 2017-06-30 DIAGNOSIS — I10 ESSENTIAL (PRIMARY) HYPERTENSION: ICD-10-CM

## 2017-06-30 DIAGNOSIS — L92.9 GRANULOMATOUS DISORDER OF THE SKIN AND SUBCUTANEOUS TISSUE, UNSPECIFIED: ICD-10-CM

## 2017-06-30 DIAGNOSIS — T86.821 SKIN GRAFT (ALLOGRAFT) (AUTOGRAFT) FAILURE: ICD-10-CM
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
## 2017-06-30 DIAGNOSIS — S30.1XXA CONTUSION OF ABDOMINAL WALL, INITIAL ENCOUNTER: Chronic | ICD-10-CM

## 2017-06-30 DIAGNOSIS — L89.90 PRESSURE ULCER OF UNSPECIFIED SITE, UNSPECIFIED STAGE: ICD-10-CM

## 2017-07-05 ENCOUNTER — OUTPATIENT (OUTPATIENT)
Dept: OUTPATIENT SERVICES | Facility: HOSPITAL | Age: 57
LOS: 1 days | Discharge: HOME | End: 2017-07-05

## 2017-07-05 DIAGNOSIS — L89.90 PRESSURE ULCER OF UNSPECIFIED SITE, UNSPECIFIED STAGE: ICD-10-CM

## 2017-07-05 DIAGNOSIS — S30.1XXA CONTUSION OF ABDOMINAL WALL, INITIAL ENCOUNTER: Chronic | ICD-10-CM

## 2017-07-05 DIAGNOSIS — Z98.890 OTHER SPECIFIED POSTPROCEDURAL STATES: Chronic | ICD-10-CM

## 2017-07-06 ENCOUNTER — OUTPATIENT (OUTPATIENT)
Dept: OUTPATIENT SERVICES | Facility: HOSPITAL | Age: 57
LOS: 1 days | Discharge: HOME | End: 2017-07-06

## 2017-07-06 DIAGNOSIS — S30.1XXA CONTUSION OF ABDOMINAL WALL, INITIAL ENCOUNTER: Chronic | ICD-10-CM

## 2017-07-06 DIAGNOSIS — T86.821 SKIN GRAFT (ALLOGRAFT) (AUTOGRAFT) FAILURE: ICD-10-CM

## 2017-07-06 DIAGNOSIS — L89.90 PRESSURE ULCER OF UNSPECIFIED SITE, UNSPECIFIED STAGE: ICD-10-CM

## 2017-07-06 DIAGNOSIS — Z98.890 OTHER SPECIFIED POSTPROCEDURAL STATES: Chronic | ICD-10-CM

## 2017-07-07 ENCOUNTER — OUTPATIENT (OUTPATIENT)
Dept: OUTPATIENT SERVICES | Facility: HOSPITAL | Age: 57
LOS: 1 days | Discharge: HOME | End: 2017-07-07

## 2017-07-07 DIAGNOSIS — Z98.890 OTHER SPECIFIED POSTPROCEDURAL STATES: Chronic | ICD-10-CM

## 2017-07-07 DIAGNOSIS — L89.90 PRESSURE ULCER OF UNSPECIFIED SITE, UNSPECIFIED STAGE: ICD-10-CM

## 2017-07-07 DIAGNOSIS — S30.1XXA CONTUSION OF ABDOMINAL WALL, INITIAL ENCOUNTER: Chronic | ICD-10-CM

## 2017-07-10 ENCOUNTER — OUTPATIENT (OUTPATIENT)
Dept: OUTPATIENT SERVICES | Facility: HOSPITAL | Age: 57
LOS: 1 days | Discharge: HOME | End: 2017-07-10

## 2017-07-10 DIAGNOSIS — L89.90 PRESSURE ULCER OF UNSPECIFIED SITE, UNSPECIFIED STAGE: ICD-10-CM

## 2017-07-10 DIAGNOSIS — S30.1XXA CONTUSION OF ABDOMINAL WALL, INITIAL ENCOUNTER: Chronic | ICD-10-CM

## 2017-07-10 DIAGNOSIS — Z98.890 OTHER SPECIFIED POSTPROCEDURAL STATES: Chronic | ICD-10-CM

## 2017-07-11 ENCOUNTER — OUTPATIENT (OUTPATIENT)
Dept: OUTPATIENT SERVICES | Facility: HOSPITAL | Age: 57
LOS: 1 days | Discharge: HOME | End: 2017-07-11

## 2017-07-11 DIAGNOSIS — Z98.890 OTHER SPECIFIED POSTPROCEDURAL STATES: Chronic | ICD-10-CM

## 2017-07-11 DIAGNOSIS — I10 ESSENTIAL (PRIMARY) HYPERTENSION: ICD-10-CM

## 2017-07-11 DIAGNOSIS — M19.90 UNSPECIFIED OSTEOARTHRITIS, UNSPECIFIED SITE: ICD-10-CM

## 2017-07-11 DIAGNOSIS — L89.90 PRESSURE ULCER OF UNSPECIFIED SITE, UNSPECIFIED STAGE: ICD-10-CM

## 2017-07-11 DIAGNOSIS — M10.9 GOUT, UNSPECIFIED: ICD-10-CM

## 2017-07-11 DIAGNOSIS — T86.821 SKIN GRAFT (ALLOGRAFT) (AUTOGRAFT) FAILURE: ICD-10-CM

## 2017-07-11 DIAGNOSIS — L92.9 GRANULOMATOUS DISORDER OF THE SKIN AND SUBCUTANEOUS TISSUE, UNSPECIFIED: ICD-10-CM
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
## 2017-07-11 DIAGNOSIS — J45.998 OTHER ASTHMA: ICD-10-CM
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
## 2017-07-11 DIAGNOSIS — S30.1XXA CONTUSION OF ABDOMINAL WALL, INITIAL ENCOUNTER: Chronic | ICD-10-CM

## 2017-07-12 ENCOUNTER — OUTPATIENT (OUTPATIENT)
Dept: OUTPATIENT SERVICES | Facility: HOSPITAL | Age: 57
LOS: 1 days | Discharge: HOME | End: 2017-07-12

## 2017-07-12 DIAGNOSIS — S30.1XXA CONTUSION OF ABDOMINAL WALL, INITIAL ENCOUNTER: Chronic | ICD-10-CM

## 2017-07-12 DIAGNOSIS — T86.821 SKIN GRAFT (ALLOGRAFT) (AUTOGRAFT) FAILURE: ICD-10-CM

## 2017-07-12 DIAGNOSIS — Z98.890 OTHER SPECIFIED POSTPROCEDURAL STATES: Chronic | ICD-10-CM

## 2017-07-12 DIAGNOSIS — L89.90 PRESSURE ULCER OF UNSPECIFIED SITE, UNSPECIFIED STAGE: ICD-10-CM

## 2017-07-13 ENCOUNTER — OUTPATIENT (OUTPATIENT)
Dept: OUTPATIENT SERVICES | Facility: HOSPITAL | Age: 57
LOS: 1 days | Discharge: HOME | End: 2017-07-13

## 2017-07-13 DIAGNOSIS — Z98.890 OTHER SPECIFIED POSTPROCEDURAL STATES: Chronic | ICD-10-CM

## 2017-07-13 DIAGNOSIS — L89.90 PRESSURE ULCER OF UNSPECIFIED SITE, UNSPECIFIED STAGE: ICD-10-CM

## 2017-07-13 DIAGNOSIS — S30.1XXA CONTUSION OF ABDOMINAL WALL, INITIAL ENCOUNTER: Chronic | ICD-10-CM

## 2017-07-14 ENCOUNTER — OUTPATIENT (OUTPATIENT)
Dept: OUTPATIENT SERVICES | Facility: HOSPITAL | Age: 57
LOS: 1 days | Discharge: HOME | End: 2017-07-14

## 2017-07-14 DIAGNOSIS — L89.90 PRESSURE ULCER OF UNSPECIFIED SITE, UNSPECIFIED STAGE: ICD-10-CM

## 2017-07-14 DIAGNOSIS — Z98.890 OTHER SPECIFIED POSTPROCEDURAL STATES: Chronic | ICD-10-CM

## 2017-07-14 DIAGNOSIS — S30.1XXA CONTUSION OF ABDOMINAL WALL, INITIAL ENCOUNTER: Chronic | ICD-10-CM

## 2017-07-17 ENCOUNTER — OUTPATIENT (OUTPATIENT)
Dept: OUTPATIENT SERVICES | Facility: HOSPITAL | Age: 57
LOS: 1 days | Discharge: HOME | End: 2017-07-17

## 2017-07-17 DIAGNOSIS — Z98.890 OTHER SPECIFIED POSTPROCEDURAL STATES: Chronic | ICD-10-CM

## 2017-07-17 DIAGNOSIS — L89.90 PRESSURE ULCER OF UNSPECIFIED SITE, UNSPECIFIED STAGE: ICD-10-CM

## 2017-07-17 DIAGNOSIS — S30.1XXA CONTUSION OF ABDOMINAL WALL, INITIAL ENCOUNTER: Chronic | ICD-10-CM

## 2017-07-18 ENCOUNTER — OUTPATIENT (OUTPATIENT)
Dept: OUTPATIENT SERVICES | Facility: HOSPITAL | Age: 57
LOS: 1 days | Discharge: HOME | End: 2017-07-18

## 2017-07-18 ENCOUNTER — APPOINTMENT (OUTPATIENT)
Dept: SURGERY | Facility: CLINIC | Age: 57
End: 2017-07-18

## 2017-07-18 VITALS
OXYGEN SATURATION: 100 % | TEMPERATURE: 98.1 F | DIASTOLIC BLOOD PRESSURE: 103 MMHG | SYSTOLIC BLOOD PRESSURE: 151 MMHG | RESPIRATION RATE: 18 BRPM | HEART RATE: 90 BPM

## 2017-07-18 DIAGNOSIS — Z98.890 OTHER SPECIFIED POSTPROCEDURAL STATES: Chronic | ICD-10-CM

## 2017-07-18 DIAGNOSIS — T14.8 OTHER INJURY OF UNSPECIFIED BODY REGION: ICD-10-CM

## 2017-07-18 DIAGNOSIS — L89.90 PRESSURE ULCER OF UNSPECIFIED SITE, UNSPECIFIED STAGE: ICD-10-CM

## 2017-07-18 DIAGNOSIS — S30.1XXA CONTUSION OF ABDOMINAL WALL, INITIAL ENCOUNTER: Chronic | ICD-10-CM

## 2017-07-19 DIAGNOSIS — T86.821 SKIN GRAFT (ALLOGRAFT) (AUTOGRAFT) FAILURE: ICD-10-CM

## 2017-07-20 DIAGNOSIS — M86.68 OTHER CHRONIC OSTEOMYELITIS, OTHER SITE: ICD-10-CM

## 2017-07-20 DIAGNOSIS — L89.224 PRESSURE ULCER OF LEFT HIP, STAGE 4: ICD-10-CM

## 2017-07-26 DIAGNOSIS — T86.821 SKIN GRAFT (ALLOGRAFT) (AUTOGRAFT) FAILURE: ICD-10-CM

## 2017-09-12 ENCOUNTER — APPOINTMENT (OUTPATIENT)
Dept: SURGERY | Facility: CLINIC | Age: 57
End: 2017-09-12
Payer: COMMERCIAL

## 2017-09-12 VITALS
HEART RATE: 119 BPM | OXYGEN SATURATION: 98 % | TEMPERATURE: 98.1 F | DIASTOLIC BLOOD PRESSURE: 122 MMHG | SYSTOLIC BLOOD PRESSURE: 146 MMHG | RESPIRATION RATE: 18 BRPM

## 2017-09-12 DIAGNOSIS — L02.211 CUTANEOUS ABSCESS OF ABDOMINAL WALL: ICD-10-CM

## 2017-09-12 PROCEDURE — 99213 OFFICE O/P EST LOW 20 MIN: CPT

## 2019-11-19 NOTE — PHYSICAL THERAPY INITIAL EVALUATION ADULT - IMPAIRMENTS FOUND, PT EVAL
muscle strength/integumentary integrity/gait, locomotion, and balance
grimace/guarding
integumentary integrity/muscle strength/gait, locomotion, and balance

## 2020-03-02 NOTE — PROGRESS NOTE ADULT - I WAS PHYSICALLY PRESENT FOR THE KEY PORTIONS OF THE EVALUATION AND MANAGEMENT (E/M) SERVICE PROVIDED.  I AGREE WITH THE ABOVE HISTORY, PHYSICAL, AND PLAN WHICH I HAVE REVIEWED AND EDITED WHERE APPROPRIATE
Statement Selected
52
Statement Selected

## 2020-09-03 NOTE — PROGRESS NOTE ADULT - PROBLEM SELECTOR PLAN 1
Problem: VTE, Risk for  Intervention: # Implement/maintain early mobilization  Description: Early mobilization is recommended including HOB up or dangle for 20 minutes during the first 24 hours; Progress activity 2-4 times/day each subsequent day, unless contraindicated.  Flowsheets (Taken 9/3/2020 0331)  Activity:   Head of bed elevation   Turn  Head of Bed Position: 30 degrees      s/p surgical debriement and closure by plastics  F/U on the new wound c/s  Being sent home on augmentin

## 2020-09-16 NOTE — H&P ADULT. - REASON FOR ADMISSION
Fever 101 degrees F Consent (Marginal Mandibular)/Introductory Paragraph: The rationale for Mohs was explained to the patient and consent was obtained. The risks, benefits and alternatives to therapy were discussed in detail. Specifically, the risks of damage to the marginal mandibular branch of the facial nerve, infection, scarring, bleeding, prolonged wound healing, incomplete removal, allergy to anesthesia, and recurrence were addressed. Prior to the procedure, the treatment site was clearly identified and confirmed by the patient. All components of Universal Protocol/PAUSE Rule completed.

## 2022-04-22 NOTE — H&P ADULT. - ASSESSMENT
Patient requests all Lab, Cardiology, and Radiology Results on their Discharge Instructions
56 year old male PMH HTN, asthma, etoh abuse now with large abdominal abscess and symptoms of etoh withdrawal

## 2023-09-07 NOTE — DISCHARGE NOTE ADULT - FUNCTIONAL SCREEN CURRENT LEVEL: AMBULATION, MLM
Pt arrives with complaints of multiple bee stings. SOB/CP.  Pt taken back immediately  
(0) independent

## 2025-02-25 NOTE — PATIENT PROFILE ADULT. - VISION (WITH CORRECTIVE LENSES IF THE PATIENT USUALLY WEARS THEM):
"  Caller: Jung Short \"SHEILA\"    Relationship: Self    Best call back number: [phone]    Who are you requesting to speak with (clinical staff, provider,  specific staff member): CLINICAL      What was the call regarding: QUESTIONS ABOUT A MEDICATION REFILL     " Normal vision: sees adequately in most situations; can see medication labels, newsprint